# Patient Record
Sex: FEMALE | Race: WHITE | NOT HISPANIC OR LATINO | Employment: FULL TIME | URBAN - METROPOLITAN AREA
[De-identification: names, ages, dates, MRNs, and addresses within clinical notes are randomized per-mention and may not be internally consistent; named-entity substitution may affect disease eponyms.]

---

## 2018-03-18 ENCOUNTER — OFFICE VISIT (OUTPATIENT)
Dept: URGENT CARE | Facility: MEDICAL CENTER | Age: 53
End: 2018-03-18
Payer: COMMERCIAL

## 2018-03-18 VITALS
HEART RATE: 116 BPM | WEIGHT: 132 LBS | TEMPERATURE: 98.6 F | RESPIRATION RATE: 18 BRPM | BODY MASS INDEX: 25.78 KG/M2 | OXYGEN SATURATION: 98 % | DIASTOLIC BLOOD PRESSURE: 64 MMHG | SYSTOLIC BLOOD PRESSURE: 124 MMHG

## 2018-03-18 DIAGNOSIS — A08.4 VIRAL GASTROENTERITIS: Primary | ICD-10-CM

## 2018-03-18 PROCEDURE — 99283 EMERGENCY DEPT VISIT LOW MDM: CPT | Performed by: PHYSICIAN ASSISTANT

## 2018-03-18 PROCEDURE — G0382 LEV 3 HOSP TYPE B ED VISIT: HCPCS | Performed by: PHYSICIAN ASSISTANT

## 2018-03-18 NOTE — LETTER
March 18, 2018     Patient: Nakia Denton   YOB: 1965   Date of Visit: 3/18/2018       To Whom it May Concern:    Nakia Denton was seen in my clinic on 3/18/2018  She may return to work on 3/19/2018  Please excuse absence  If you have any questions or concerns, please don't hesitate to call           Sincerely,          Lucas Mcfadden PA-C        CC: No Recipients

## 2018-03-18 NOTE — PATIENT INSTRUCTIONS
Likely viral gastroenteritis, symptoms resolved at this time  [place] diet- BRAT- bananas, rice, applesauce and toast and advance as tolerated  Double up on fluids  Follow up with your PCP as needed  Go to the ER for any distress

## 2018-03-18 NOTE — PROGRESS NOTES
[address] [facility]        NAME: Bharti Langston is a 47 y o  female  : 1965    MRN: [id number]  DATE: 2018  TIME: 7:19 PM    Assessment and Plan   Viral gastroenteritis [A08 4]  1  Viral gastroenteritis           Patient Instructions      Likely viral gastroenteritis, symptoms resolved at this time  [place] diet- BRAT- bananas, rice, applesauce and toast and advance as tolerated  Double up on fluids  Follow up with PCP in 3-5 days  Proceed to  ER if symptoms worsen  Chief Complaint     Chief Complaint   Patient presents with    Headache         History of Present Illness         This is a 80-year-old female presenting nausea and vomiting x2 days  She states that her symptoms are resolved now, but she was having generalized abdominal pain, nausea, vomiting for 2 days that started 3 days ago  She last vomited last night  She states that she does have a mild headache at this time   But no vision changes, hearing changes, dizziness, fever  She is requesting a note so that she can return to work  Headache    Associated symptoms include abdominal pain, nausea and vomiting  Pertinent negatives include no coughing, dizziness, fever, numbness or weakness  Review of Systems   Review of Systems   Constitutional: Positive for appetite change  Negative for chills, fatigue and fever  Respiratory: Negative for cough and shortness of breath  Cardiovascular: Negative for chest pain and palpitations  Gastrointestinal: Positive for abdominal pain, nausea and vomiting  Negative for blood in stool and diarrhea  Musculoskeletal: Negative for arthralgias and myalgias  Neurological: Positive for headaches  Negative for dizziness, weakness and numbness  Current Medications     No current outpatient prescriptions on file      Current Allergies     Allergies as of 2018    (No Known Allergies)            The following portions of the patient's history were reviewed and updated as appropriate: allergies, current medications, past family history, past medical history, past social history, past surgical history and problem list      Past Medical History:   Diagnosis Date    Known health problems: none        No past surgical history on file  No family history on file  Medications have been verified  Objective   /64   Pulse (!) 116   Temp 98 6 °F (37 °C) (Temporal)   Resp 18   Wt 59 9 kg (132 lb)   SpO2 98%   BMI 25 78 kg/m²        Physical Exam     Physical Exam   Constitutional: She appears well-developed and well-nourished  HENT:   Head: Normocephalic and atraumatic  Right Ear: No drainage  Left Ear: No drainage  Nose: Nose normal    Mouth/Throat: Uvula is midline, oropharynx is clear and moist and mucous membranes are normal  No oropharyngeal exudate, posterior oropharyngeal edema or posterior oropharyngeal erythema  Eyes: Conjunctivae and EOM are normal  Pupils are equal, round, and reactive to light  Neck: Normal range of motion  Cardiovascular: Normal rate, regular rhythm and normal heart sounds  Pulmonary/Chest: Effort normal and breath sounds normal  No respiratory distress  She has no decreased breath sounds  She has no wheezes  She has no rhonchi  She has no rales  Abdominal: Soft  Bowel sounds are normal  She exhibits no distension and no mass  There is no tenderness  There is no rebound and no guarding  Neurological: She is alert  Skin: Skin is warm and dry  No rash noted  Psychiatric: She has a normal mood and affect

## 2019-05-02 ENCOUNTER — OFFICE VISIT (OUTPATIENT)
Dept: FAMILY MEDICINE CLINIC | Facility: MEDICAL CENTER | Age: 54
End: 2019-05-02
Payer: COMMERCIAL

## 2019-05-02 ENCOUNTER — HOSPITAL ENCOUNTER (OUTPATIENT)
Facility: HOSPITAL | Age: 54
Setting detail: OBSERVATION
Discharge: HOME | End: 2019-05-03
Attending: EMERGENCY MEDICINE | Admitting: INTERNAL MEDICINE
Payer: COMMERCIAL

## 2019-05-02 ENCOUNTER — APPOINTMENT (EMERGENCY)
Dept: RADIOLOGY | Facility: HOSPITAL | Age: 54
End: 2019-05-02
Payer: COMMERCIAL

## 2019-05-02 ENCOUNTER — APPOINTMENT (EMERGENCY)
Dept: CT IMAGING | Facility: HOSPITAL | Age: 54
End: 2019-05-02
Payer: COMMERCIAL

## 2019-05-02 VITALS
WEIGHT: 162 LBS | SYSTOLIC BLOOD PRESSURE: 180 MMHG | HEIGHT: 60 IN | RESPIRATION RATE: 16 BRPM | DIASTOLIC BLOOD PRESSURE: 96 MMHG | BODY MASS INDEX: 31.8 KG/M2 | HEART RATE: 76 BPM

## 2019-05-02 DIAGNOSIS — R07.9 CHEST PAIN, UNSPECIFIED TYPE: Primary | ICD-10-CM

## 2019-05-02 DIAGNOSIS — R10.9 LEFT FLANK PAIN: ICD-10-CM

## 2019-05-02 DIAGNOSIS — D25.9 UTERINE LEIOMYOMA, UNSPECIFIED LOCATION: ICD-10-CM

## 2019-05-02 DIAGNOSIS — H53.9 VISUAL DISTURBANCE: ICD-10-CM

## 2019-05-02 DIAGNOSIS — Z13.31 POSITIVE DEPRESSION SCREENING: ICD-10-CM

## 2019-05-02 DIAGNOSIS — R07.9 CHEST PAIN, UNSPECIFIED TYPE: ICD-10-CM

## 2019-05-02 DIAGNOSIS — R06.02 SOB (SHORTNESS OF BREATH): ICD-10-CM

## 2019-05-02 DIAGNOSIS — R10.9 ABDOMINAL PAIN, UNSPECIFIED ABDOMINAL LOCATION: ICD-10-CM

## 2019-05-02 DIAGNOSIS — I16.0 HYPERTENSIVE URGENCY: ICD-10-CM

## 2019-05-02 DIAGNOSIS — R03.0 ELEVATED BP WITHOUT DIAGNOSIS OF HYPERTENSION: ICD-10-CM

## 2019-05-02 DIAGNOSIS — I20.0 UNSTABLE ANGINA (HCC): Primary | ICD-10-CM

## 2019-05-02 PROBLEM — M79.89 LEG SWELLING: Status: ACTIVE | Noted: 2019-05-02

## 2019-05-02 LAB
ALBUMIN SERPL BCP-MCNC: 3.7 G/DL (ref 3.5–5)
ALP SERPL-CCNC: 99 U/L (ref 46–116)
ALT SERPL W P-5'-P-CCNC: 28 U/L (ref 12–78)
ANION GAP SERPL CALCULATED.3IONS-SCNC: 10 MMOL/L (ref 4–13)
AST SERPL W P-5'-P-CCNC: 17 U/L (ref 5–45)
BACTERIA UR QL AUTO: NORMAL /HPF
BASOPHILS # BLD AUTO: 0.06 THOUSANDS/ΜL (ref 0–0.1)
BASOPHILS NFR BLD AUTO: 1 % (ref 0–1)
BILIRUB SERPL-MCNC: 0.1 MG/DL (ref 0.2–1)
BILIRUB UR QL STRIP: NEGATIVE
BUN SERPL-MCNC: 14 MG/DL (ref 5–25)
CALCIUM SERPL-MCNC: 9.7 MG/DL (ref 8.3–10.1)
CHLORIDE SERPL-SCNC: 104 MMOL/L (ref 100–108)
CLARITY UR: CLEAR
CO2 SERPL-SCNC: 27 MMOL/L (ref 21–32)
COLOR UR: YELLOW
CREAT SERPL-MCNC: 0.83 MG/DL (ref 0.6–1.3)
EOSINOPHIL # BLD AUTO: 0.35 THOUSAND/ΜL (ref 0–0.61)
EOSINOPHIL NFR BLD AUTO: 5 % (ref 0–6)
ERYTHROCYTE [DISTWIDTH] IN BLOOD BY AUTOMATED COUNT: 12.9 % (ref 11.6–15.1)
EST. AVERAGE GLUCOSE BLD GHB EST-MCNC: 126 MG/DL
EXT PREG TEST URINE: NEGATIVE
GFR SERPL CREATININE-BSD FRML MDRD: 81 ML/MIN/1.73SQ M
GLUCOSE SERPL-MCNC: 112 MG/DL (ref 65–140)
GLUCOSE UR STRIP-MCNC: NEGATIVE MG/DL
HBA1C MFR BLD: 6 % (ref 4.2–6.3)
HCT VFR BLD AUTO: 41.7 % (ref 34.8–46.1)
HGB BLD-MCNC: 13.4 G/DL (ref 11.5–15.4)
HGB UR QL STRIP.AUTO: ABNORMAL
IMM GRANULOCYTES # BLD AUTO: 0.04 THOUSAND/UL (ref 0–0.2)
IMM GRANULOCYTES NFR BLD AUTO: 1 % (ref 0–2)
KETONES UR STRIP-MCNC: NEGATIVE MG/DL
LEUKOCYTE ESTERASE UR QL STRIP: NEGATIVE
LYMPHOCYTES # BLD AUTO: 2.96 THOUSANDS/ΜL (ref 0.6–4.47)
LYMPHOCYTES NFR BLD AUTO: 40 % (ref 14–44)
MCH RBC QN AUTO: 29.6 PG (ref 26.8–34.3)
MCHC RBC AUTO-ENTMCNC: 32.1 G/DL (ref 31.4–37.4)
MCV RBC AUTO: 92 FL (ref 82–98)
MONOCYTES # BLD AUTO: 0.47 THOUSAND/ΜL (ref 0.17–1.22)
MONOCYTES NFR BLD AUTO: 6 % (ref 4–12)
NEUTROPHILS # BLD AUTO: 3.54 THOUSANDS/ΜL (ref 1.85–7.62)
NEUTS SEG NFR BLD AUTO: 47 % (ref 43–75)
NITRITE UR QL STRIP: NEGATIVE
NON-SQ EPI CELLS URNS QL MICRO: NORMAL /HPF
NRBC BLD AUTO-RTO: 0 /100 WBCS
NT-PROBNP SERPL-MCNC: 68 PG/ML
PH UR STRIP.AUTO: 6 [PH] (ref 4.5–8)
PLATELET # BLD AUTO: 274 THOUSANDS/UL (ref 149–390)
PMV BLD AUTO: 10.2 FL (ref 8.9–12.7)
POTASSIUM SERPL-SCNC: 4 MMOL/L (ref 3.5–5.3)
PROT SERPL-MCNC: 7.5 G/DL (ref 6.4–8.2)
PROT UR STRIP-MCNC: NEGATIVE MG/DL
RBC # BLD AUTO: 4.52 MILLION/UL (ref 3.81–5.12)
RBC #/AREA URNS AUTO: NORMAL /HPF
SODIUM SERPL-SCNC: 141 MMOL/L (ref 136–145)
SP GR UR STRIP.AUTO: <=1.005 (ref 1–1.03)
TROPONIN I SERPL-MCNC: <0.02 NG/ML
TSH SERPL DL<=0.05 MIU/L-ACNC: 3.3 UIU/ML (ref 0.36–3.74)
UROBILINOGEN UR QL STRIP.AUTO: 0.2 E.U./DL
WBC # BLD AUTO: 7.42 THOUSAND/UL (ref 4.31–10.16)
WBC #/AREA URNS AUTO: NORMAL /HPF

## 2019-05-02 PROCEDURE — 36415 COLL VENOUS BLD VENIPUNCTURE: CPT | Performed by: EMERGENCY MEDICINE

## 2019-05-02 PROCEDURE — 96374 THER/PROPH/DIAG INJ IV PUSH: CPT

## 2019-05-02 PROCEDURE — 99220 PR INITIAL OBSERVATION CARE/DAY 70 MINUTES: CPT | Performed by: INTERNAL MEDICINE

## 2019-05-02 PROCEDURE — 83036 HEMOGLOBIN GLYCOSYLATED A1C: CPT | Performed by: PHYSICIAN ASSISTANT

## 2019-05-02 PROCEDURE — 74174 CTA ABD&PLVS W/CONTRAST: CPT

## 2019-05-02 PROCEDURE — 81001 URINALYSIS AUTO W/SCOPE: CPT

## 2019-05-02 PROCEDURE — 71275 CT ANGIOGRAPHY CHEST: CPT

## 2019-05-02 PROCEDURE — 93005 ELECTROCARDIOGRAM TRACING: CPT

## 2019-05-02 PROCEDURE — 85025 COMPLETE CBC W/AUTO DIFF WBC: CPT | Performed by: EMERGENCY MEDICINE

## 2019-05-02 PROCEDURE — 81025 URINE PREGNANCY TEST: CPT | Performed by: EMERGENCY MEDICINE

## 2019-05-02 PROCEDURE — 83880 ASSAY OF NATRIURETIC PEPTIDE: CPT | Performed by: EMERGENCY MEDICINE

## 2019-05-02 PROCEDURE — 96375 TX/PRO/DX INJ NEW DRUG ADDON: CPT

## 2019-05-02 PROCEDURE — 99285 EMERGENCY DEPT VISIT HI MDM: CPT

## 2019-05-02 PROCEDURE — 99285 EMERGENCY DEPT VISIT HI MDM: CPT | Performed by: EMERGENCY MEDICINE

## 2019-05-02 PROCEDURE — 84484 ASSAY OF TROPONIN QUANT: CPT | Performed by: PHYSICIAN ASSISTANT

## 2019-05-02 PROCEDURE — 71046 X-RAY EXAM CHEST 2 VIEWS: CPT

## 2019-05-02 PROCEDURE — 99204 OFFICE O/P NEW MOD 45 MIN: CPT | Performed by: FAMILY MEDICINE

## 2019-05-02 PROCEDURE — 80053 COMPREHEN METABOLIC PANEL: CPT | Performed by: EMERGENCY MEDICINE

## 2019-05-02 PROCEDURE — 84443 ASSAY THYROID STIM HORMONE: CPT | Performed by: PHYSICIAN ASSISTANT

## 2019-05-02 PROCEDURE — 84484 ASSAY OF TROPONIN QUANT: CPT | Performed by: EMERGENCY MEDICINE

## 2019-05-02 RX ORDER — METHOCARBAMOL 500 MG/1
500 TABLET, FILM COATED ORAL EVERY 8 HOURS SCHEDULED
Status: DISCONTINUED | OUTPATIENT
Start: 2019-05-02 | End: 2019-05-03 | Stop reason: HOSPADM

## 2019-05-02 RX ORDER — HYDROCHLOROTHIAZIDE 25 MG/1
25 TABLET ORAL DAILY
Status: DISCONTINUED | OUTPATIENT
Start: 2019-05-03 | End: 2019-05-03 | Stop reason: HOSPADM

## 2019-05-02 RX ORDER — MAGNESIUM HYDROXIDE/ALUMINUM HYDROXICE/SIMETHICONE 120; 1200; 1200 MG/30ML; MG/30ML; MG/30ML
30 SUSPENSION ORAL ONCE
Status: COMPLETED | OUTPATIENT
Start: 2019-05-02 | End: 2019-05-02

## 2019-05-02 RX ORDER — ONDANSETRON 2 MG/ML
4 INJECTION INTRAMUSCULAR; INTRAVENOUS EVERY 6 HOURS PRN
Status: DISCONTINUED | OUTPATIENT
Start: 2019-05-02 | End: 2019-05-03 | Stop reason: HOSPADM

## 2019-05-02 RX ORDER — 0.9 % SODIUM CHLORIDE 0.9 %
3 VIAL (ML) INJECTION AS NEEDED
Status: DISCONTINUED | OUTPATIENT
Start: 2019-05-02 | End: 2019-05-03 | Stop reason: HOSPADM

## 2019-05-02 RX ORDER — FAMOTIDINE 20 MG/1
20 TABLET, FILM COATED ORAL 2 TIMES DAILY
Status: DISCONTINUED | OUTPATIENT
Start: 2019-05-02 | End: 2019-05-03 | Stop reason: HOSPADM

## 2019-05-02 RX ORDER — METHOCARBAMOL 500 MG/1
500 TABLET, FILM COATED ORAL EVERY 6 HOURS PRN
Status: DISCONTINUED | OUTPATIENT
Start: 2019-05-02 | End: 2019-05-02

## 2019-05-02 RX ORDER — ACETAMINOPHEN 325 MG/1
650 TABLET ORAL EVERY 6 HOURS PRN
Status: DISCONTINUED | OUTPATIENT
Start: 2019-05-02 | End: 2019-05-03

## 2019-05-02 RX ORDER — LIDOCAINE 50 MG/G
1 PATCH TOPICAL DAILY
Status: DISCONTINUED | OUTPATIENT
Start: 2019-05-02 | End: 2019-05-03 | Stop reason: HOSPADM

## 2019-05-02 RX ORDER — FUROSEMIDE 10 MG/ML
40 INJECTION INTRAMUSCULAR; INTRAVENOUS ONCE
Status: COMPLETED | OUTPATIENT
Start: 2019-05-02 | End: 2019-05-02

## 2019-05-02 RX ORDER — MAGNESIUM HYDROXIDE/ALUMINUM HYDROXICE/SIMETHICONE 120; 1200; 1200 MG/30ML; MG/30ML; MG/30ML
30 SUSPENSION ORAL EVERY 4 HOURS PRN
Status: DISCONTINUED | OUTPATIENT
Start: 2019-05-02 | End: 2019-05-03 | Stop reason: HOSPADM

## 2019-05-02 RX ORDER — KETOROLAC TROMETHAMINE 30 MG/ML
15 INJECTION, SOLUTION INTRAMUSCULAR; INTRAVENOUS ONCE
Status: COMPLETED | OUTPATIENT
Start: 2019-05-02 | End: 2019-05-02

## 2019-05-02 RX ADMIN — METHOCARBAMOL TABLETS 500 MG: 500 TABLET, COATED ORAL at 17:00

## 2019-05-02 RX ADMIN — NITROGLYCERIN 1 INCH: 20 OINTMENT TOPICAL at 19:32

## 2019-05-02 RX ADMIN — ALUMINUM HYDROXIDE, MAGNESIUM HYDROXIDE, AND SIMETHICONE 30 ML: 200; 200; 20 SUSPENSION ORAL at 22:44

## 2019-05-02 RX ADMIN — LIDOCAINE 1 PATCH: 50 PATCH TOPICAL at 19:32

## 2019-05-02 RX ADMIN — ONDANSETRON 4 MG: 2 INJECTION INTRAMUSCULAR; INTRAVENOUS at 20:12

## 2019-05-02 RX ADMIN — ACETAMINOPHEN 650 MG: 325 TABLET, FILM COATED ORAL at 15:55

## 2019-05-02 RX ADMIN — LIDOCAINE HYDROCHLORIDE 10 ML: 20 SOLUTION ORAL; TOPICAL at 14:29

## 2019-05-02 RX ADMIN — METHOCARBAMOL TABLETS 500 MG: 500 TABLET, COATED ORAL at 21:30

## 2019-05-02 RX ADMIN — KETOROLAC TROMETHAMINE 15 MG: 30 INJECTION, SOLUTION INTRAMUSCULAR; INTRAVENOUS at 12:14

## 2019-05-02 RX ADMIN — IOHEXOL 100 ML: 350 INJECTION, SOLUTION INTRAVENOUS at 13:29

## 2019-05-02 RX ADMIN — SODIUM CHLORIDE 500 ML: 0.9 INJECTION, SOLUTION INTRAVENOUS at 21:30

## 2019-05-02 RX ADMIN — MORPHINE SULFATE 2 MG: 2 INJECTION, SOLUTION INTRAMUSCULAR; INTRAVENOUS at 14:09

## 2019-05-02 RX ADMIN — LIDOCAINE HYDROCHLORIDE 15 ML: 20 SOLUTION ORAL; TOPICAL at 22:44

## 2019-05-02 RX ADMIN — FAMOTIDINE 20 MG: 20 TABLET ORAL at 22:44

## 2019-05-02 RX ADMIN — ALUMINUM HYDROXIDE, MAGNESIUM HYDROXIDE, AND SIMETHICONE 30 ML: 200; 200; 20 SUSPENSION ORAL at 14:29

## 2019-05-02 RX ADMIN — FUROSEMIDE 40 MG: 10 INJECTION, SOLUTION INTRAMUSCULAR; INTRAVENOUS at 15:55

## 2019-05-03 ENCOUNTER — APPOINTMENT (OUTPATIENT)
Dept: NON INVASIVE DIAGNOSTICS | Facility: HOSPITAL | Age: 54
End: 2019-05-03
Payer: COMMERCIAL

## 2019-05-03 VITALS
WEIGHT: 158.95 LBS | RESPIRATION RATE: 18 BRPM | SYSTOLIC BLOOD PRESSURE: 108 MMHG | HEART RATE: 77 BPM | BODY MASS INDEX: 31.21 KG/M2 | TEMPERATURE: 98.5 F | HEIGHT: 60 IN | DIASTOLIC BLOOD PRESSURE: 61 MMHG | OXYGEN SATURATION: 96 %

## 2019-05-03 LAB
ANION GAP SERPL CALCULATED.3IONS-SCNC: 8 MMOL/L (ref 4–13)
ATRIAL RATE: 64 BPM
ATRIAL RATE: 99 BPM
BUN SERPL-MCNC: 20 MG/DL (ref 5–25)
CALCIUM SERPL-MCNC: 9.5 MG/DL (ref 8.3–10.1)
CHLORIDE SERPL-SCNC: 104 MMOL/L (ref 100–108)
CHOLEST SERPL-MCNC: 280 MG/DL (ref 50–200)
CO2 SERPL-SCNC: 28 MMOL/L (ref 21–32)
CREAT SERPL-MCNC: 1.12 MG/DL (ref 0.6–1.3)
GFR SERPL CREATININE-BSD FRML MDRD: 57 ML/MIN/1.73SQ M
GLUCOSE SERPL-MCNC: 100 MG/DL (ref 65–140)
HDLC SERPL-MCNC: 76 MG/DL (ref 40–60)
LDLC SERPL CALC-MCNC: 192 MG/DL (ref 0–100)
P AXIS: 55 DEGREES
P AXIS: 66 DEGREES
POTASSIUM SERPL-SCNC: 4.1 MMOL/L (ref 3.5–5.3)
PR INTERVAL: 152 MS
PR INTERVAL: 158 MS
QRS AXIS: 23 DEGREES
QRS AXIS: 9 DEGREES
QRSD INTERVAL: 74 MS
QRSD INTERVAL: 78 MS
QT INTERVAL: 360 MS
QT INTERVAL: 458 MS
QTC INTERVAL: 462 MS
QTC INTERVAL: 472 MS
SODIUM SERPL-SCNC: 140 MMOL/L (ref 136–145)
T WAVE AXIS: 34 DEGREES
T WAVE AXIS: 70 DEGREES
TRIGL SERPL-MCNC: 61 MG/DL
VENTRICULAR RATE: 64 BPM
VENTRICULAR RATE: 99 BPM

## 2019-05-03 PROCEDURE — 99244 OFF/OP CNSLTJ NEW/EST MOD 40: CPT | Performed by: INTERNAL MEDICINE

## 2019-05-03 PROCEDURE — 93010 ELECTROCARDIOGRAM REPORT: CPT | Performed by: INTERNAL MEDICINE

## 2019-05-03 PROCEDURE — 93306 TTE W/DOPPLER COMPLETE: CPT

## 2019-05-03 PROCEDURE — 93306 TTE W/DOPPLER COMPLETE: CPT | Performed by: INTERNAL MEDICINE

## 2019-05-03 PROCEDURE — 80048 BASIC METABOLIC PNL TOTAL CA: CPT | Performed by: PHYSICIAN ASSISTANT

## 2019-05-03 PROCEDURE — 99217 PR OBSERVATION CARE DISCHARGE MANAGEMENT: CPT | Performed by: INTERNAL MEDICINE

## 2019-05-03 PROCEDURE — 80061 LIPID PANEL: CPT | Performed by: PHYSICIAN ASSISTANT

## 2019-05-03 RX ORDER — ATORVASTATIN CALCIUM 40 MG/1
40 TABLET, FILM COATED ORAL
Status: DISCONTINUED | OUTPATIENT
Start: 2019-05-03 | End: 2019-05-03 | Stop reason: HOSPADM

## 2019-05-03 RX ORDER — METHOCARBAMOL 500 MG/1
500 TABLET, FILM COATED ORAL EVERY 8 HOURS SCHEDULED
Qty: 20 TABLET | Refills: 0 | Status: SHIPPED | OUTPATIENT
Start: 2019-05-03 | End: 2019-05-08

## 2019-05-03 RX ORDER — ATORVASTATIN CALCIUM 40 MG/1
40 TABLET, FILM COATED ORAL
Qty: 30 TABLET | Refills: 0 | Status: SHIPPED | OUTPATIENT
Start: 2019-05-03 | End: 2019-05-30 | Stop reason: SDUPTHER

## 2019-05-03 RX ORDER — IBUPROFEN 600 MG/1
600 TABLET ORAL EVERY 8 HOURS PRN
Status: DISCONTINUED | OUTPATIENT
Start: 2019-05-03 | End: 2019-05-03 | Stop reason: HOSPADM

## 2019-05-03 RX ORDER — FAMOTIDINE 20 MG/1
20 TABLET, FILM COATED ORAL 2 TIMES DAILY
Qty: 60 TABLET | Refills: 0 | Status: SHIPPED | OUTPATIENT
Start: 2019-05-03 | End: 2019-05-08

## 2019-05-03 RX ORDER — HYDROCHLOROTHIAZIDE 25 MG/1
25 TABLET ORAL DAILY
Qty: 30 TABLET | Refills: 0 | Status: SHIPPED | OUTPATIENT
Start: 2019-05-04 | End: 2019-05-30 | Stop reason: SDUPTHER

## 2019-05-03 RX ADMIN — FAMOTIDINE 20 MG: 20 TABLET ORAL at 17:54

## 2019-05-03 RX ADMIN — IBUPROFEN 600 MG: 600 TABLET ORAL at 14:07

## 2019-05-03 RX ADMIN — FAMOTIDINE 20 MG: 20 TABLET ORAL at 11:38

## 2019-05-03 RX ADMIN — DICLOFENAC 2 G: 10 GEL TOPICAL at 14:06

## 2019-05-03 RX ADMIN — METHOCARBAMOL TABLETS 500 MG: 500 TABLET, COATED ORAL at 05:25

## 2019-05-03 RX ADMIN — METHOCARBAMOL TABLETS 500 MG: 500 TABLET, COATED ORAL at 14:07

## 2019-05-03 RX ADMIN — LIDOCAINE 1 PATCH: 50 PATCH TOPICAL at 17:55

## 2019-05-03 RX ADMIN — ATORVASTATIN CALCIUM 40 MG: 40 TABLET, FILM COATED ORAL at 17:54

## 2019-05-03 RX ADMIN — ACETAMINOPHEN 650 MG: 325 TABLET, FILM COATED ORAL at 05:25

## 2019-05-03 RX ADMIN — HYDROCHLOROTHIAZIDE 25 MG: 25 TABLET ORAL at 11:38

## 2019-05-03 RX ADMIN — METOPROLOL TARTRATE 25 MG: 25 TABLET ORAL at 14:06

## 2019-05-06 ENCOUNTER — TRANSITIONAL CARE MANAGEMENT (OUTPATIENT)
Dept: FAMILY MEDICINE CLINIC | Facility: MEDICAL CENTER | Age: 54
End: 2019-05-06

## 2019-05-06 ENCOUNTER — HOSPITAL ENCOUNTER (OUTPATIENT)
Dept: NON INVASIVE DIAGNOSTICS | Facility: HOSPITAL | Age: 54
Discharge: HOME | End: 2019-05-06
Attending: INTERNAL MEDICINE
Payer: COMMERCIAL

## 2019-05-06 ENCOUNTER — HOSPITAL ENCOUNTER (OUTPATIENT)
Dept: NUCLEAR MEDICINE | Facility: HOSPITAL | Age: 54
Discharge: HOME | End: 2019-05-06
Attending: INTERNAL MEDICINE
Payer: COMMERCIAL

## 2019-05-06 DIAGNOSIS — I16.0 HYPERTENSIVE URGENCY: ICD-10-CM

## 2019-05-06 DIAGNOSIS — R07.9 CHEST PAIN, UNSPECIFIED TYPE: ICD-10-CM

## 2019-05-06 LAB
CHEST PAIN STATEMENT: NORMAL
MAX DIASTOLIC BP: 98 MMHG
MAX HEART RATE: 121 BPM
MAX PREDICTED HEART RATE: 168 BPM
MAX. SYSTOLIC BP: 168 MMHG
PROTOCOL NAME: NORMAL
REASON FOR TERMINATION: NORMAL
TARGET HR FORMULA: NORMAL
TEST INDICATION: NORMAL
TIME IN EXERCISE PHASE: NORMAL

## 2019-05-06 PROCEDURE — A9502 TC99M TETROFOSMIN: HCPCS

## 2019-05-06 PROCEDURE — 93017 CV STRESS TEST TRACING ONLY: CPT

## 2019-05-06 PROCEDURE — 93018 CV STRESS TEST I&R ONLY: CPT | Performed by: INTERNAL MEDICINE

## 2019-05-06 PROCEDURE — 78452 HT MUSCLE IMAGE SPECT MULT: CPT | Performed by: INTERNAL MEDICINE

## 2019-05-06 PROCEDURE — 78452 HT MUSCLE IMAGE SPECT MULT: CPT

## 2019-05-06 PROCEDURE — 93016 CV STRESS TEST SUPVJ ONLY: CPT | Performed by: INTERNAL MEDICINE

## 2019-05-06 RX ADMIN — REGADENOSON 0.4 MG: 0.08 INJECTION, SOLUTION INTRAVENOUS at 09:45

## 2019-05-08 ENCOUNTER — OFFICE VISIT (OUTPATIENT)
Dept: FAMILY MEDICINE CLINIC | Facility: MEDICAL CENTER | Age: 54
End: 2019-05-08
Payer: COMMERCIAL

## 2019-05-08 VITALS
HEART RATE: 76 BPM | WEIGHT: 161.8 LBS | SYSTOLIC BLOOD PRESSURE: 136 MMHG | BODY MASS INDEX: 31.6 KG/M2 | DIASTOLIC BLOOD PRESSURE: 80 MMHG | RESPIRATION RATE: 16 BRPM

## 2019-05-08 DIAGNOSIS — I10 ESSENTIAL HYPERTENSION: Primary | ICD-10-CM

## 2019-05-08 DIAGNOSIS — Z12.39 SCREENING FOR BREAST CANCER: ICD-10-CM

## 2019-05-08 DIAGNOSIS — E78.00 ELEVATED CHOLESTEROL: ICD-10-CM

## 2019-05-08 DIAGNOSIS — R91.1 LUNG NODULE: ICD-10-CM

## 2019-05-08 DIAGNOSIS — D25.9 UTERINE LEIOMYOMA, UNSPECIFIED LOCATION: ICD-10-CM

## 2019-05-08 DIAGNOSIS — Z77.22 SECOND HAND SMOKE EXPOSURE: ICD-10-CM

## 2019-05-08 DIAGNOSIS — Z12.11 SCREENING FOR COLON CANCER: ICD-10-CM

## 2019-05-08 DIAGNOSIS — I51.89 DIASTOLIC DYSFUNCTION: ICD-10-CM

## 2019-05-08 DIAGNOSIS — N93.9 ABNORMAL VAGINAL BLEEDING: ICD-10-CM

## 2019-05-08 PROBLEM — M79.89 LEG SWELLING: Status: RESOLVED | Noted: 2019-05-02 | Resolved: 2019-05-08

## 2019-05-08 PROCEDURE — 1111F DSCHRG MED/CURRENT MED MERGE: CPT | Performed by: FAMILY MEDICINE

## 2019-05-08 PROCEDURE — 99495 TRANSJ CARE MGMT MOD F2F 14D: CPT | Performed by: FAMILY MEDICINE

## 2019-05-13 ENCOUNTER — TELEPHONE (OUTPATIENT)
Dept: CARDIOLOGY CLINIC | Facility: CLINIC | Age: 54
End: 2019-05-13

## 2019-05-30 ENCOUNTER — OFFICE VISIT (OUTPATIENT)
Dept: CARDIOLOGY CLINIC | Facility: CLINIC | Age: 54
End: 2019-05-30
Payer: COMMERCIAL

## 2019-05-30 VITALS
BODY MASS INDEX: 31.59 KG/M2 | HEIGHT: 60 IN | SYSTOLIC BLOOD PRESSURE: 124 MMHG | WEIGHT: 160.9 LBS | HEART RATE: 62 BPM | DIASTOLIC BLOOD PRESSURE: 78 MMHG

## 2019-05-30 DIAGNOSIS — I16.0 HYPERTENSIVE URGENCY: ICD-10-CM

## 2019-05-30 DIAGNOSIS — R07.9 CHEST PAIN, UNSPECIFIED TYPE: Primary | ICD-10-CM

## 2019-05-30 DIAGNOSIS — E78.5 HYPERLIPIDEMIA, UNSPECIFIED HYPERLIPIDEMIA TYPE: ICD-10-CM

## 2019-05-30 DIAGNOSIS — I10 HYPERTENSION, UNSPECIFIED TYPE: ICD-10-CM

## 2019-05-30 PROCEDURE — 99214 OFFICE O/P EST MOD 30 MIN: CPT | Performed by: INTERNAL MEDICINE

## 2019-05-30 PROCEDURE — 93000 ELECTROCARDIOGRAM COMPLETE: CPT | Performed by: INTERNAL MEDICINE

## 2019-05-30 RX ORDER — HYDROCHLOROTHIAZIDE 25 MG/1
25 TABLET ORAL DAILY
Qty: 90 TABLET | Refills: 3 | Status: SHIPPED | OUTPATIENT
Start: 2019-05-30 | End: 2020-04-22

## 2019-05-30 RX ORDER — ATORVASTATIN CALCIUM 40 MG/1
40 TABLET, FILM COATED ORAL
Qty: 90 TABLET | Refills: 3 | Status: SHIPPED | OUTPATIENT
Start: 2019-05-30 | End: 2020-04-22

## 2019-06-27 ENCOUNTER — HOSPITAL ENCOUNTER (OUTPATIENT)
Dept: RADIOLOGY | Facility: MEDICAL CENTER | Age: 54
Discharge: HOME | End: 2019-06-27
Payer: COMMERCIAL

## 2019-06-27 ENCOUNTER — OFFICE VISIT (OUTPATIENT)
Dept: URGENT CARE | Facility: MEDICAL CENTER | Age: 54
End: 2019-06-27
Payer: COMMERCIAL

## 2019-06-27 VITALS
HEIGHT: 60 IN | RESPIRATION RATE: 16 BRPM | DIASTOLIC BLOOD PRESSURE: 83 MMHG | WEIGHT: 158 LBS | HEART RATE: 87 BPM | SYSTOLIC BLOOD PRESSURE: 138 MMHG | TEMPERATURE: 98.4 F | BODY MASS INDEX: 31.02 KG/M2

## 2019-06-27 VITALS — WEIGHT: 160 LBS | BODY MASS INDEX: 31.41 KG/M2 | HEIGHT: 60 IN

## 2019-06-27 DIAGNOSIS — M79.672 PAIN IN BOTH FEET: Primary | ICD-10-CM

## 2019-06-27 DIAGNOSIS — Z12.39 SCREENING FOR BREAST CANCER: ICD-10-CM

## 2019-06-27 DIAGNOSIS — M79.671 PAIN IN BOTH FEET: Primary | ICD-10-CM

## 2019-06-27 DIAGNOSIS — L25.9 CONTACT DERMATITIS, UNSPECIFIED CONTACT DERMATITIS TYPE, UNSPECIFIED TRIGGER: ICD-10-CM

## 2019-06-27 PROCEDURE — 99203 OFFICE O/P NEW LOW 30 MIN: CPT | Performed by: PHYSICIAN ASSISTANT

## 2019-06-27 PROCEDURE — G0382 LEV 3 HOSP TYPE B ED VISIT: HCPCS | Performed by: PHYSICIAN ASSISTANT

## 2019-06-27 PROCEDURE — 99283 EMERGENCY DEPT VISIT LOW MDM: CPT | Performed by: PHYSICIAN ASSISTANT

## 2019-06-27 PROCEDURE — 77063 BREAST TOMOSYNTHESIS BI: CPT

## 2019-06-27 PROCEDURE — 77067 SCR MAMMO BI INCL CAD: CPT

## 2019-06-27 RX ORDER — PREDNISONE 20 MG/1
20 TABLET ORAL 2 TIMES DAILY WITH MEALS
Qty: 10 TABLET | Refills: 0 | Status: SHIPPED | OUTPATIENT
Start: 2019-06-27 | End: 2019-07-03 | Stop reason: ALTCHOICE

## 2019-07-03 ENCOUNTER — APPOINTMENT (OUTPATIENT)
Dept: RADIOLOGY | Facility: MEDICAL CENTER | Age: 54
End: 2019-07-03
Payer: COMMERCIAL

## 2019-07-03 ENCOUNTER — OFFICE VISIT (OUTPATIENT)
Dept: FAMILY MEDICINE CLINIC | Facility: MEDICAL CENTER | Age: 54
End: 2019-07-03
Payer: COMMERCIAL

## 2019-07-03 VITALS
BODY MASS INDEX: 31.49 KG/M2 | SYSTOLIC BLOOD PRESSURE: 136 MMHG | HEART RATE: 90 BPM | DIASTOLIC BLOOD PRESSURE: 82 MMHG | HEIGHT: 60 IN | WEIGHT: 160.38 LBS | RESPIRATION RATE: 14 BRPM

## 2019-07-03 DIAGNOSIS — M79.671 FOOT PAIN, BILATERAL: ICD-10-CM

## 2019-07-03 DIAGNOSIS — I10 ESSENTIAL HYPERTENSION: Primary | ICD-10-CM

## 2019-07-03 DIAGNOSIS — M79.672 FOOT PAIN, BILATERAL: ICD-10-CM

## 2019-07-03 DIAGNOSIS — E78.00 ELEVATED CHOLESTEROL: ICD-10-CM

## 2019-07-03 PROBLEM — E78.5 HYPERLIPIDEMIA: Status: RESOLVED | Noted: 2019-05-30 | Resolved: 2019-07-03

## 2019-07-03 PROBLEM — R07.9 CHEST PAIN: Status: RESOLVED | Noted: 2019-05-02 | Resolved: 2019-07-03

## 2019-07-03 PROCEDURE — 99213 OFFICE O/P EST LOW 20 MIN: CPT | Performed by: FAMILY MEDICINE

## 2019-07-03 PROCEDURE — 73630 X-RAY EXAM OF FOOT: CPT

## 2019-07-03 NOTE — PROGRESS NOTES
Assessment/Plan:    No problem-specific Assessment & Plan notes found for this encounter  Diagnoses and all orders for this visit:    Essential hypertension  Blood pressure is well controlled today  Continue hydrochlorothiazide 25 mg daily and metoprolol 25 mg twice daily  Patient encouraged to get her labs done  Elevated cholesterol  Patient encouraged to get her labs done to check her cholesterol level  Will await results and adjust medication if needed  For now continue atorvastatin 40 mg daily  Foot pain, bilateral  -     XR foot 3+ vw left; Future  -     XR foot 3+ vw right; Future  Highly suspect plantar fasciitis  Possible heel spurs as well  This is not work related  I encouraged patient to stretch her plantar fascia with either a tennis ball or a sealed can of soda  Check x-rays to assess for bony abnormality  Patient was highly encouraged to purchase a good pair of walking shoes and this should greatly helped her symptoms  Weight loss with low-calorie diet and regular exercise should also improve her pain as well  Follow-up in six months or sooner if needed  Subjective:      Patient ID: Nanci High is a 47 y o  female  Patient presents for follow-up  She has hypertension  She is currently on hydrochlorothiazide 25 mg daily and metoprolol 25 mg twice daily for treatment  Tolerating medication well without any lightheadedness or dizziness  Has not yet had her repeat blood work drawn  She has elevated cholesterol  She is currently on atorvastatin 40 mg daily  Tolerating medication well without any myalgias  Has not yet had her repeat blood work drawn  She has bilateral foot pain  Location is the bottom of the feet  Started hurting the past few weeks since she started her new job  [place] a [place] the [place]  Was seen at the urgent care recently and told to use ibuprofen        The following portions of the patient's history were reviewed and updated as appropriate: She  has a past medical history of Fibroids  She   Patient Active Problem List    Diagnosis Date Noted    Hypertension 30/31/3597    Diastolic dysfunction 53/55/9357    Elevated cholesterol 05/08/2019    Second hand smoke exposure 05/08/2019    Lung nodule 05/08/2019    Leiomyoma of uterus 03/18/2014    Dysmenorrhea 03/18/2014    Abnormal vaginal bleeding 03/18/2014     She  has a past surgical history that includes Endometrial biopsy; Tonsillectomy; and Tubal ligation  Her family history includes Arthritis in her maternal grandmother and mother; Breast cancer in her other; Colon cancer (age of onset: 58) in her maternal grandfather; Diabetes in her maternal grandmother and paternal grandmother; Diverticulitis in her mother; Heart disease in her maternal grandmother, mother, and paternal grandmother; Hypertension in her mother; Migraines in her mother; No Known Problems in her daughter, father, paternal grandfather, and sister; Thyroid disease in her mother; Varicose Veins in her maternal grandmother  She  reports that she has never smoked  She has never used smokeless tobacco  She reports that she drinks alcohol  She reports that she has current or past drug history  Current Outpatient Medications   Medication Sig Dispense Refill    atorvastatin (LIPITOR) 40 mg tablet Take 1 tablet (40 mg total) by mouth daily with dinner 90 tablet 3    hydrochlorothiazide (HYDRODIURIL) 25 mg tablet Take 1 tablet (25 mg total) by mouth daily 90 tablet 3    metoprolol tartrate (LOPRESSOR) 25 mg tablet Take 1 tablet (25 mg total) by mouth every 12 (twelve) hours 180 tablet 3     No current facility-administered medications for this visit        Current Outpatient Medications on File Prior to Visit   Medication Sig    atorvastatin (LIPITOR) 40 mg tablet Take 1 tablet (40 mg total) by mouth daily with dinner    hydrochlorothiazide (HYDRODIURIL) 25 mg tablet Take 1 tablet (25 mg total) by mouth daily    metoprolol tartrate (LOPRESSOR) 25 mg tablet Take 1 tablet (25 mg total) by mouth every 12 (twelve) hours    [DISCONTINUED] predniSONE 20 mg tablet Take 1 tablet (20 mg total) by mouth 2 (two) times a day with meals for 5 days     No current facility-administered medications on file prior to visit  She has No Known Allergies  BMI Counseling: Body mass index is 31 32 kg/m²  Discussed the patient's BMI with her  The BMI is above average  BMI counseling and education was provided to the patient  Nutrition recommendations include decreasing overall calorie intake  Exercise recommendations include moderate aerobic physical activity for 150 minutes/week       Review of Systems   Constitutional: Negative for fever  Respiratory: Negative for shortness of breath  Cardiovascular: Negative for chest pain  Objective:      /82   Pulse 90   Resp 14   Ht 5' (1 524 m)   Wt 72 7 kg (160 lb 6 oz)   BMI 31 32 kg/m²          Physical Exam   Constitutional: She appears well-developed and well-nourished  Cardiovascular: Normal rate, regular rhythm and normal heart sounds  Pulmonary/Chest: Effort normal and breath sounds normal    Musculoskeletal:   Bilateral pain on palpation along the plantar fascia  Foot exam otherwise unremarkable

## 2019-07-09 ENCOUNTER — TELEPHONE (OUTPATIENT)
Dept: FAMILY MEDICINE CLINIC | Facility: MEDICAL CENTER | Age: 54
End: 2019-07-09

## 2019-07-09 DIAGNOSIS — M77.32 HEEL SPUR, LEFT: Primary | ICD-10-CM

## 2019-07-09 NOTE — TELEPHONE ENCOUNTER
Left foot x-ray does show a heel spur  Right foot x-ray unremarkable  I recommend referral to Podiatry for further evaluation of the heel spur  Otherwise patient's pain likely secondary to plantar fasciitis

## 2019-07-09 NOTE — TELEPHONE ENCOUNTER
Tried to leave info on her vm but it keeps cutting me off   Asked her to call back   Dr Micheline Rhoades- [phone]

## 2019-07-10 ENCOUNTER — OFFICE VISIT (OUTPATIENT)
Dept: OBGYN CLINIC | Facility: CLINIC | Age: 54
End: 2019-07-10
Payer: COMMERCIAL

## 2019-07-10 VITALS
WEIGHT: 155 LBS | HEIGHT: 61 IN | BODY MASS INDEX: 29.27 KG/M2 | DIASTOLIC BLOOD PRESSURE: 88 MMHG | SYSTOLIC BLOOD PRESSURE: 124 MMHG

## 2019-07-10 DIAGNOSIS — Z12.31 ENCOUNTER FOR SCREENING MAMMOGRAM FOR MALIGNANT NEOPLASM OF BREAST: ICD-10-CM

## 2019-07-10 DIAGNOSIS — Z11.51 SCREENING FOR HPV (HUMAN PAPILLOMAVIRUS): ICD-10-CM

## 2019-07-10 DIAGNOSIS — Z12.4 SCREENING FOR MALIGNANT NEOPLASM OF CERVIX: ICD-10-CM

## 2019-07-10 DIAGNOSIS — R30.0 DYSURIA: ICD-10-CM

## 2019-07-10 DIAGNOSIS — Z01.419 WELL FEMALE EXAM WITH ROUTINE GYNECOLOGICAL EXAM: Primary | ICD-10-CM

## 2019-07-10 LAB
SL AMB  POCT GLUCOSE, UA: NORMAL
SL AMB LEUKOCYTE ESTERASE,UA: NORMAL
SL AMB POCT BILIRUBIN,UA: NORMAL
SL AMB POCT BLOOD,UA: NORMAL
SL AMB POCT CLARITY,UA: NORMAL
SL AMB POCT COLOR,UA: YELLOW
SL AMB POCT KETONES,UA: NORMAL
SL AMB POCT NITRITE,UA: NORMAL
SL AMB POCT PH,UA: 5.5
SL AMB POCT SPECIFIC GRAVITY,UA: 1.03
SL AMB POCT URINE PROTEIN: NORMAL
SL AMB POCT UROBILINOGEN: NORMAL

## 2019-07-10 PROCEDURE — G0145 SCR C/V CYTO,THINLAYER,RESCR: HCPCS | Performed by: OBSTETRICS & GYNECOLOGY

## 2019-07-10 PROCEDURE — 99386 PREV VISIT NEW AGE 40-64: CPT | Performed by: OBSTETRICS & GYNECOLOGY

## 2019-07-10 PROCEDURE — 87624 HPV HI-RISK TYP POOLED RSLT: CPT | Performed by: OBSTETRICS & GYNECOLOGY

## 2019-07-10 PROCEDURE — 81003 URINALYSIS AUTO W/O SCOPE: CPT | Performed by: OBSTETRICS & GYNECOLOGY

## 2019-07-10 NOTE — PROGRESS NOTES
ASSESSMENT & PLAN: Janice Borges is a 47 y o  [id number] with normal gynecologic exam     1   Routine well woman exam done today  2   Pap and HPV: Pap with HPV was done today  Current ASCCP Guidelines reviewed  3   Mammogram up to date, ordered by PCP  Recommend yearly mammography  4   Colonoscopy recommended per guidelines  5  The patient is sexually active  6  LLQ pain, increased frequency: UA negative  ? Kidney stone  Normal GYN exam, does not appear to be GYN in origin  Instructed pt to f/up with PCP, otherwise continue supportive care at this time with tylenol/motrin PRN and hydration  7  The following were reviewed in today's visit: breast self exam, menopause, exercise and healthy diet  8  Patient to return to office in 12 months for annual exam      All questions have been answered to her satisfaction  CC:  Annual Gynecologic Examination    HPI: Janice Borges is a 47 y o  [id number] who presents for annual gynecologic examination  She has the following concerns:  LLQ pain  States it started about 3 days ago, is constant, radiates to her back  Mostly tender on her L flank  Increased urinary frequency  She occasionally reports hot flashes, night sweats, other symptoms of menopause  She does not report urinary incontinence  She does not report postmenopausal bleeding  Health Maintenance:    She exercises 0 days per week  She wears her seatbelt routinely  She does perform regular monthly self breast exams  She feels safe at home  She does follow a balanced diet  She does not use tobacco  Last mammogram: 6/2019 [facility]  Last colonoscopy: never had one    Past Medical History:   Diagnosis Date    Fibroids     History of ovarian cyst     Hypertension     Migraine     Thyroid nodule        Past Surgical History:   Procedure Laterality Date    ENDOMETRIAL BIOPSY      W/o cervical dilation   Resolved 9/30/2014      TONSILLECTOMY      Age 6      TUBAL LIGATION Past OB/Gyn History:  Period Cycle (Days): (n/a post menopausal )No LMP recorded (approximate)   Patient is postmenopausal     Patient is currently sexually active: heterosexual  Patient is postmenopausal     Last Pap  2014: normal per patient  Denies history of abnormal pap tests    OB History    Para Term  AB Living   3 3 3 0 0 3   SAB TAB Ectopic Multiple Live Births   0 0 0 0 3      # Outcome Date GA Lbr Juarez/2nd Weight Sex Delivery Anes PTL Lv   3 Term 88    M Vag-Spont   DEONDRE   2 Term 88    F Vag-Spont   DEONDRE   1 Term 86    Mertha Danika Vag-Spont   DEONDRE      Obstetric Comments   Menarche 10    Post menopausal in 2015 @ age 51       Family History:  Family History   Problem Relation Age of Onset    Arthritis Mother     Diverticulitis Mother     Heart disease Mother     Hypertension Mother     Migraines Mother     Thyroid disease Mother     Arthritis Maternal Grandmother     Diabetes Maternal Grandmother     Heart disease Maternal Grandmother     Varicose Veins Maternal Grandmother     Heart attack Maternal Grandmother     Colon cancer Maternal Grandfather 58    Diabetes Paternal Grandmother     Heart disease Paternal Grandmother     Heart attack Paternal Grandmother     Heart disease Father     Heart disease Sister     Hypertension Sister     No Known Problems Daughter     Clotting disorder Paternal Grandfather     Heart disease Brother     Heart murmur Brother     No Known Problems Son     No Known Problems Son     Breast cancer Other        Social History:  Social History     Socioeconomic History    Marital status:      Spouse name: Not on file    Number of children: Not on file    Years of education: Not on file    Highest education level: Not on file   Occupational History    Not on file   Social Needs    Financial resource strain: Not on file    Food insecurity:     Worry: Not on file     Inability: Not on file    Transportation needs: Medical: Not on file     Non-medical: Not on file   Tobacco Use    Smoking status: Never Smoker    Smokeless tobacco: Never Used   Substance and Sexual Activity    Alcohol use: Yes     Comment: occasional    Drug use: Never    Sexual activity: Yes     Partners: Male     Birth control/protection: Post-menopausal, Female Sterilization   Lifestyle    Physical activity:     Days per week: Not on file     Minutes per session: Not on file    Stress: Not on file   Relationships    Social connections:     Talks on phone: Not on file     Gets together: Not on file     Attends Mandaen service: Not on file     Active member of club or organization: Not on file     Attends meetings of clubs or organizations: Not on file     Relationship status: Not on file    Intimate partner violence:     Fear of current or ex partner: Not on file     Emotionally abused: Not on file     Physically abused: Not on file     Forced sexual activity: Not on file   Other Topics Concern    Not on file   Social History Narrative    Marital history: Currently  - As per [employer]      Presently lives with her grandson  Patient is single  Patient is currently employed  Allergies:  No Known Allergies    Medications:    Current Outpatient Medications:     atorvastatin (LIPITOR) 40 mg tablet, Take 1 tablet (40 mg total) by mouth daily with dinner, Disp: 90 tablet, Rfl: 3    hydrochlorothiazide (HYDRODIURIL) 25 mg tablet, Take 1 tablet (25 mg total) by mouth daily, Disp: 90 tablet, Rfl: 3    metoprolol tartrate (LOPRESSOR) 25 mg tablet, Take 1 tablet (25 mg total) by mouth every 12 (twelve) hours, Disp: 180 tablet, Rfl: 3    Review of Systems:  Denies fevers, chills, unintentional weight loss, excessive fatigue, chest pain, shortness of breath, abdominal pain, nausea, vomiting, urinary incontinence, urinary frequency, vaginal bleeding, vaginal discharge  All other systems negative unless otherwise stated       Physical Exam:  BP 124/88   Ht 5' 1" (1 549 m)   Wt 70 3 kg (155 lb)   LMP  (Approximate) Comment: 2015  Breastfeeding? No   BMI 29 29 kg/m²  Body mass index is 29 29 kg/m²  GEN: The patient was alert and oriented x3, pleasant well-appearing female in no acute distress  HEENT:  Unremarkable, no anterior or posterior lymphadenopathy, no thyromegaly  CV:  Regular rate and rhythm, normal S1 and S2, no murmurs  RESP:  Clear to auscultation bilaterally, no wheezes, rales or rhonchi  BREAST:  Symmetric breasts with no palpable breast masses or obvious breast lesions  She has no retractions or nipple discharge  She has no axillary abnormalities or palpable masses  GI:  Soft, nontender, non-distended  MSK: bilateral lower extremities are nontender, no edema  : Normal appearing external female genitalia, normal appearing urethral meatus  On sterile speculum exam, atrophic appearing vaginal epithelium, no vaginal discharge, no bleeding, grossly normal appearing cervix  On bimanual exam, no cervical motion tenderness; uterus is smooth, mobile, nontender 6 weeks size  No tenderness or fullness in the bilateral adnexa

## 2019-07-12 LAB
HPV HR 12 DNA CVX QL NAA+PROBE: NEGATIVE
HPV16 DNA CVX QL NAA+PROBE: NEGATIVE
HPV18 DNA CVX QL NAA+PROBE: NEGATIVE

## 2019-07-15 LAB
LAB AP GYN PRIMARY INTERPRETATION: NORMAL
Lab: NORMAL

## 2019-10-24 ENCOUNTER — APPOINTMENT (OUTPATIENT)
Dept: LAB | Facility: MEDICAL CENTER | Age: 54
End: 2019-10-24
Payer: COMMERCIAL

## 2019-10-24 DIAGNOSIS — R07.9 CHEST PAIN, UNSPECIFIED TYPE: ICD-10-CM

## 2019-10-24 LAB
CHOLEST SERPL-MCNC: 196 MG/DL (ref 50–200)
HDLC SERPL-MCNC: 69 MG/DL
LDLC SERPL CALC-MCNC: 102 MG/DL (ref 0–100)
TRIGL SERPL-MCNC: 123 MG/DL

## 2019-10-24 PROCEDURE — 80061 LIPID PANEL: CPT

## 2019-10-24 PROCEDURE — 36415 COLL VENOUS BLD VENIPUNCTURE: CPT

## 2019-10-25 ENCOUNTER — TELEPHONE (OUTPATIENT)
Dept: CARDIOLOGY CLINIC | Facility: CLINIC | Age: 54
End: 2019-10-25

## 2019-10-25 NOTE — TELEPHONE ENCOUNTER
----- Message from Twin Kulkarni MD sent at 10/25/2019  8:55 AM EDT -----  Lipids were good, significantly improved from before  Continue same    Please let her know

## 2020-01-06 ENCOUNTER — OFFICE VISIT (OUTPATIENT)
Dept: FAMILY MEDICINE CLINIC | Facility: MEDICAL CENTER | Age: 55
End: 2020-01-06
Payer: COMMERCIAL

## 2020-01-06 ENCOUNTER — APPOINTMENT (OUTPATIENT)
Dept: LAB | Facility: MEDICAL CENTER | Age: 55
End: 2020-01-06
Payer: COMMERCIAL

## 2020-01-06 VITALS
BODY MASS INDEX: 28.7 KG/M2 | HEIGHT: 61 IN | RESPIRATION RATE: 16 BRPM | HEART RATE: 80 BPM | DIASTOLIC BLOOD PRESSURE: 64 MMHG | SYSTOLIC BLOOD PRESSURE: 126 MMHG | WEIGHT: 152 LBS

## 2020-01-06 DIAGNOSIS — Z11.4 SCREENING FOR HIV (HUMAN IMMUNODEFICIENCY VIRUS): ICD-10-CM

## 2020-01-06 DIAGNOSIS — Z11.59 NEED FOR HEPATITIS C SCREENING TEST: ICD-10-CM

## 2020-01-06 DIAGNOSIS — Z00.00 PHYSICAL EXAM, ANNUAL: Primary | ICD-10-CM

## 2020-01-06 DIAGNOSIS — I10 ESSENTIAL HYPERTENSION: ICD-10-CM

## 2020-01-06 DIAGNOSIS — R91.1 LUNG NODULE: ICD-10-CM

## 2020-01-06 DIAGNOSIS — E78.00 ELEVATED CHOLESTEROL: ICD-10-CM

## 2020-01-06 DIAGNOSIS — Z12.11 SCREENING FOR COLON CANCER: ICD-10-CM

## 2020-01-06 LAB
ALBUMIN SERPL BCP-MCNC: 3.6 G/DL (ref 3.5–5)
ALP SERPL-CCNC: 100 U/L (ref 46–116)
ALT SERPL W P-5'-P-CCNC: 26 U/L (ref 12–78)
ANION GAP SERPL CALCULATED.3IONS-SCNC: 4 MMOL/L (ref 4–13)
AST SERPL W P-5'-P-CCNC: 15 U/L (ref 5–45)
BILIRUB SERPL-MCNC: 0.19 MG/DL (ref 0.2–1)
BUN SERPL-MCNC: 17 MG/DL (ref 5–25)
CALCIUM SERPL-MCNC: 9.3 MG/DL (ref 8.3–10.1)
CHLORIDE SERPL-SCNC: 105 MMOL/L (ref 100–108)
CHOLEST SERPL-MCNC: 225 MG/DL (ref 50–200)
CO2 SERPL-SCNC: 30 MMOL/L (ref 21–32)
CREAT SERPL-MCNC: 0.9 MG/DL (ref 0.6–1.3)
CREAT UR-MCNC: 99.4 MG/DL
GFR SERPL CREATININE-BSD FRML MDRD: 73 ML/MIN/1.73SQ M
GLUCOSE P FAST SERPL-MCNC: 97 MG/DL (ref 65–99)
HCV AB SER QL: NORMAL
HDLC SERPL-MCNC: 62 MG/DL
LDLC SERPL CALC-MCNC: 101 MG/DL (ref 0–100)
MICROALBUMIN UR-MCNC: <5 MG/L (ref 0–20)
MICROALBUMIN/CREAT 24H UR: <5 MG/G CREATININE (ref 0–30)
POTASSIUM SERPL-SCNC: 3.6 MMOL/L (ref 3.5–5.3)
PROT SERPL-MCNC: 7.2 G/DL (ref 6.4–8.2)
SODIUM SERPL-SCNC: 139 MMOL/L (ref 136–145)
TRIGL SERPL-MCNC: 312 MG/DL

## 2020-01-06 PROCEDURE — 36415 COLL VENOUS BLD VENIPUNCTURE: CPT

## 2020-01-06 PROCEDURE — 80053 COMPREHEN METABOLIC PANEL: CPT

## 2020-01-06 PROCEDURE — 82043 UR ALBUMIN QUANTITATIVE: CPT | Performed by: FAMILY MEDICINE

## 2020-01-06 PROCEDURE — 86803 HEPATITIS C AB TEST: CPT

## 2020-01-06 PROCEDURE — 80061 LIPID PANEL: CPT

## 2020-01-06 PROCEDURE — 82570 ASSAY OF URINE CREATININE: CPT | Performed by: FAMILY MEDICINE

## 2020-01-06 PROCEDURE — 99396 PREV VISIT EST AGE 40-64: CPT | Performed by: FAMILY MEDICINE

## 2020-01-06 PROCEDURE — 87389 HIV-1 AG W/HIV-1&-2 AB AG IA: CPT

## 2020-01-06 NOTE — PROGRESS NOTES
Assessment/Plan:    No problem-specific Assessment & Plan notes found for this encounter  Diagnoses and all orders for this visit:    Physical exam, annual  81-year-old female presenting for a physical exam   Continue regular follow-up with Gynecology and Cardiology  BMI Counseling: Body mass index is 28 72 kg/m²  The BMI is above normal  Nutrition recommendations include decreasing overall calorie intake  Exercise recommendations include moderate aerobic physical activity for 150 minutes/week  Lung nodule  -     CT chest wo contrast; Future  Lung nodule on previous CT scan from May of 2019  Patient is higher risk for lung cancer due to his secondhand smoke exposure  Order placed for repeat CT scan in May of 2020  Screening for colon cancer  -     Ambulatory referral to Gastroenterology; Future  Referral to Gastroenterology for colon cancer screening  Screening for HIV (human immunodeficiency virus)  -     HIV 1/2 AG-AB combo; Future  Need for hepatitis C screening test  -     Hepatitis C antibody; Future  Patient did give verbal consent for hep C and HIV screening  Follow-up in one year sooner if needed  Subjective:      Patient ID: Maribel Ruiz is a 49 y o  female  Patient presents for a physical exam   She admits to a poor diet and lack of exercise  She does not smoke  Does drink alcohol occasionally  No drug use  She does have a gynecologist and has had a Pap smear as well as a mammogram   Due for her next mammogram in June  She does have a lung nodule and is wondering when her next scan is due  Patient is followed by Cardiology  No acute concerns  Has not yet had a colonoscopy  Agreeable to labs  Did have her flu vaccine  The following portions of the patient's history were reviewed and updated as appropriate:   She  has a past medical history of Fibroids, History of ovarian cyst, Hypertension, Migraine, and Thyroid nodule    She   Patient Active Problem List    Diagnosis Date Noted    Heel spur, left 07/09/2019    Hypertension 64/98/2000    Diastolic dysfunction 16/81/1634    Elevated cholesterol 05/08/2019    Second hand smoke exposure 05/08/2019    Lung nodule 05/08/2019     She  has a past surgical history that includes Endometrial biopsy; Tonsillectomy; and Tubal ligation  Her family history includes Arthritis in her maternal grandmother and mother; Breast cancer in her other; Clotting disorder in her paternal grandfather; Colon cancer (age of onset: 58) in her maternal grandfather; Diabetes in her maternal grandmother and paternal grandmother; Diverticulitis in her mother; Heart attack in her maternal grandmother and paternal grandmother; Heart disease in her brother, father, maternal grandmother, mother, paternal grandmother, and sister; Heart murmur in her brother; Hypertension in her mother and sister; Migraines in her mother; No Known Problems in her daughter, son, and son; Thyroid disease in her mother; Varicose Veins in her maternal grandmother  She  reports that she has never smoked  She has never used smokeless tobacco  She reports that she drinks alcohol  She reports that she does not use drugs  Current Outpatient Medications   Medication Sig Dispense Refill    atorvastatin (LIPITOR) 40 mg tablet Take 1 tablet (40 mg total) by mouth daily with dinner 90 tablet 3    hydrochlorothiazide (HYDRODIURIL) 25 mg tablet Take 1 tablet (25 mg total) by mouth daily 90 tablet 3    metoprolol tartrate (LOPRESSOR) 25 mg tablet Take 1 tablet (25 mg total) by mouth every 12 (twelve) hours 180 tablet 3     No current facility-administered medications for this visit        Current Outpatient Medications on File Prior to Visit   Medication Sig    atorvastatin (LIPITOR) 40 mg tablet Take 1 tablet (40 mg total) by mouth daily with dinner    hydrochlorothiazide (HYDRODIURIL) 25 mg tablet Take 1 tablet (25 mg total) by mouth daily    metoprolol tartrate (LOPRESSOR) 25 mg tablet Take 1 tablet (25 mg total) by mouth every 12 (twelve) hours     No current facility-administered medications on file prior to visit  She has No Known Allergies       Review of Systems   Constitutional: Negative for fever  Respiratory: Negative for shortness of breath  Cardiovascular: Negative for chest pain  Gastrointestinal: Negative for abdominal pain and blood in stool  Genitourinary: Negative for dysuria and hematuria  Musculoskeletal: Negative for arthralgias and myalgias  Skin: Negative for rash  Neurological: Negative for headaches  Objective:      /64 (BP Location: Left arm, Patient Position: Sitting, Cuff Size: Adult)   Pulse 80   Resp 16   Ht 5' 1" (1 549 m)   Wt 68 9 kg (152 lb)   BMI 28 72 kg/m²          Physical Exam   Constitutional: She is oriented to person, place, and time  Vital signs are normal  She appears well-developed and well-nourished  HENT:   Head: Normocephalic and atraumatic  Right Ear: Tympanic membrane, external ear and ear canal normal    Left Ear: Tympanic membrane, external ear and ear canal normal    Nose: Nose normal    Mouth/Throat: Uvula is midline, oropharynx is clear and moist and mucous membranes are normal    Eyes: Pupils are equal, round, and reactive to light  Conjunctivae, EOM and lids are normal    Neck: Trachea normal  Neck supple  No thyromegaly present  Cardiovascular: Normal rate, regular rhythm, S1 normal and S2 normal    No murmur heard  Pulmonary/Chest: Effort normal and breath sounds normal    Abdominal: Soft  Bowel sounds are normal  There is no tenderness  Lymphadenopathy:     She has no cervical adenopathy  Neurological: She is alert and oriented to person, place, and time  No cranial nerve deficit  Skin: Skin is warm, dry and intact  Psychiatric: She has a normal mood and affect   Her speech is normal and behavior is normal  Thought content normal

## 2020-01-07 LAB — HIV 1+2 AB+HIV1 P24 AG SERPL QL IA: NORMAL

## 2020-01-08 ENCOUNTER — TELEPHONE (OUTPATIENT)
Dept: FAMILY MEDICINE CLINIC | Facility: MEDICAL CENTER | Age: 55
End: 2020-01-08

## 2020-01-08 NOTE — TELEPHONE ENCOUNTER
Patient aware  She said that she is taking her cholesterol medication as prescribed  She has to call today to set up an appointment with cardiology so she will make sure she has them take a look at her labs as well

## 2020-01-08 NOTE — TELEPHONE ENCOUNTER
----- Message from Caroll Frankel, DO sent at 1/8/2020  9:08 AM EST -----  Labs reviewed  Hep C and HIV negative  Cholesterol remains elevated  Is patient taking her cholesterol medication that is prescribed by her cardiologist?  Remainder of labs unremarkable

## 2020-01-30 ENCOUNTER — OFFICE VISIT (OUTPATIENT)
Dept: CARDIOLOGY CLINIC | Facility: MEDICAL CENTER | Age: 55
End: 2020-01-30
Payer: COMMERCIAL

## 2020-01-30 ENCOUNTER — DOCUMENTATION (OUTPATIENT)
Dept: CARDIOLOGY CLINIC | Facility: CLINIC | Age: 55
End: 2020-01-30

## 2020-01-30 VITALS
DIASTOLIC BLOOD PRESSURE: 80 MMHG | HEIGHT: 61 IN | WEIGHT: 152.1 LBS | HEART RATE: 82 BPM | SYSTOLIC BLOOD PRESSURE: 130 MMHG | OXYGEN SATURATION: 98 % | BODY MASS INDEX: 28.72 KG/M2

## 2020-01-30 DIAGNOSIS — I10 ESSENTIAL HYPERTENSION: Primary | ICD-10-CM

## 2020-01-30 DIAGNOSIS — I51.89 DIASTOLIC DYSFUNCTION: ICD-10-CM

## 2020-01-30 DIAGNOSIS — I20.0 UNSTABLE ANGINA (HCC): ICD-10-CM

## 2020-01-30 DIAGNOSIS — E78.5 HYPERLIPIDEMIA, UNSPECIFIED HYPERLIPIDEMIA TYPE: ICD-10-CM

## 2020-01-30 PROCEDURE — 3075F SYST BP GE 130 - 139MM HG: CPT | Performed by: INTERNAL MEDICINE

## 2020-01-30 PROCEDURE — 3079F DIAST BP 80-89 MM HG: CPT | Performed by: INTERNAL MEDICINE

## 2020-01-30 PROCEDURE — 99214 OFFICE O/P EST MOD 30 MIN: CPT | Performed by: INTERNAL MEDICINE

## 2020-01-30 PROCEDURE — 93000 ELECTROCARDIOGRAM COMPLETE: CPT | Performed by: INTERNAL MEDICINE

## 2020-01-30 RX ORDER — CLOPIDOGREL BISULFATE 75 MG/1
75 TABLET ORAL DAILY
Qty: 14 TABLET | Refills: 3 | Status: SHIPPED | OUTPATIENT
Start: 2020-01-30 | End: 2020-02-04 | Stop reason: HOSPADM

## 2020-01-30 RX ORDER — ASPIRIN 325 MG
325 TABLET, DELAYED RELEASE (ENTERIC COATED) ORAL DAILY
Qty: 30 TABLET | Refills: 0 | Status: SHIPPED | OUTPATIENT
Start: 2020-01-30 | End: 2020-02-04 | Stop reason: HOSPADM

## 2020-01-30 NOTE — PROGRESS NOTES
Cardiology Follow Up    Paul Hobson  1965  [id number]  [facility] [place]  [facility]  [address]  [phone]  [phone]    1  Essential hypertension  POCT ECG   2  Diastolic dysfunction  POCT ECG   3  Unstable angina (HCC)  clopidogrel (PLAVIX) 75 mg tablet    Basic metabolic panel    CBC    Protime-INR    APTT    aspirin (ECOTRIN) 325 mg EC tablet   4  Hyperlipidemia, unspecified hyperlipidemia type         Diagnoses and all orders for this visit:    Essential hypertension  -     POCT ECG    Diastolic dysfunction  -     POCT ECG    Unstable angina (HCC)  -     clopidogrel (PLAVIX) 75 mg tablet; Take 1 tablet (75 mg total) by mouth daily  -     Basic metabolic panel  -     CBC  -     Protime-INR  -     APTT  -     aspirin (ECOTRIN) 325 mg EC tablet; Take 1 tablet (325 mg total) by mouth daily    Hyperlipidemia, unspecified hyperlipidemia type      I had the pleasure of seeing Paul Hobson for a follow up visit  INTERVAL HISTORY: chest pains    History of the presenting illness, Discussion/Summary and My Plan are as follows:::    She is a very pleasant 71-year-old lady with a history of hypertension and dyslipidemia-both untreated till 2019, no diabetes, no personal history of tobacco use, some passive exposure to smoke, who had not sought any medical attention for some time and then got medical insurance and sought attention for chest pains, evaluated by Dr Leal, found to be hypertensive and sent to the ER where blood pressure was elevated even further to 240/114 in May 2019  At that point evaluation for chest pains was negative, she had a CT scan an echocardiogram and subsequently a pharmacologic stress test all of which were essentially unremarkable  At the office visit, her chest pains were clearly reproducible to palpation and no further testing was performed      At this visit she complains of chest pains for the last month or 2, felt as heaviness in the left chest, with radiation to the left arm and to the left side of neck, associated with exertion, nausea, shortness of breath and sweating, present mostly at work-she has a physically demanding job at [employer]  Also complains of fatigue, gets only about 3-4 hours of sleep at night  Has a lot of stress in life      She does have a strong  family history of premature vascular disease-heart attack in her father in his 45s, who was a nonsmoker and nondiabetic, her brother had a mild heart attack-he is a current smoker, mother who is a diabetic also has premature vascular disease as well as significant hyperlipidemia and follows with Dr Cheney      ECG today shows normal sinus rhythm with PVCs  Cardiac physical exam is also unremarkable        On a statin, her lipids have significantly improved from 192-101       Plan:     Chest pain:  At this visit, suggestive of unstable angina-see description above, will proceed directly with coronary angiography  She will start aspirin 325 mg daily as well as Plavix 75 mg daily, procedure will be set up for Tuesday-2/3/2020  Will check basic preprocedure blood work  Risk factors  include  lifelong elevation in lipids, till recently uncontrolled hypertension and heavy exposure to passive tobacco smoke from multiple family members    Fatigue: If coronary angiography is negative, will check a sleep study for sleep apnea  Also has a lot of stress that is probably impeding restful sleep      Hypertension:  currently well controlled on hydrochlorothiazide and metoprolol, no changes at this time     Dyslipidemia:  Now on a statin, lipids have improved, await coronary angiography results      follow-up in 1-2 months      Results for Wilma Serra (MRN [id number]) as of 1/30/2020 16:23   Ref   Range 5/3/2019 05:05 10/24/2019 09:02 1/6/2020 15:31   Cholesterol Latest Ref Range: 50 - 200 mg/dL 280 (H) 196 225 (H)   Triglycerides Latest Ref Range: <=150 mg/dL 61 123 312 (H)   HDL Latest Ref Range: >=40 mg/dL 76 (H) 69 62   LDL Direct Latest Ref Range: 0 - 100 mg/dL 192 (H) 102 (H) 101 (H)       Patient Active Problem List   Diagnosis    Diastolic dysfunction    Elevated cholesterol    Second hand smoke exposure    Lung nodule    Hypertension    Heel spur, left    Unstable angina (HCC)    Hyperlipidemia     Past Medical History:   Diagnosis Date    Fibroids     History of ovarian cyst     Hypertension     Migraine     Thyroid nodule      Social History     Socioeconomic History    Marital status:      Spouse name: Not on file    Number of children: Not on file    Years of education: Not on file    Highest education level: Not on file   Occupational History    Not on file   Social Needs    Financial resource strain: Not on file    Food insecurity:     Worry: Not on file     Inability: Not on file    Transportation needs:     Medical: Not on file     Non-medical: Not on file   Tobacco Use    Smoking status: Never Smoker    Smokeless tobacco: Never Used   Substance and Sexual Activity    Alcohol use: Yes     Comment: occasional    Drug use: Never    Sexual activity: Yes     Partners: Male     Birth control/protection: Post-menopausal, Female Sterilization   Lifestyle    Physical activity:     Days per week: Not on file     Minutes per session: Not on file    Stress: Not on file   Relationships    Social connections:     Talks on phone: Not on file     Gets together: Not on file     Attends Sikhism service: Not on file     Active member of club or organization: Not on file     Attends meetings of clubs or organizations: Not on file     Relationship status: Not on file    Intimate partner violence:     Fear of current or ex partner: Not on file     Emotionally abused: Not on file     Physically abused: Not on file     Forced sexual activity: Not on file   Other Topics Concern  Not on file   Social History Narrative    Marital history: Currently  - As per [employer]       Family History   Problem Relation Age of Onset    Arthritis Mother     Diverticulitis Mother     Heart disease Mother     Hypertension Mother     Migraines Mother     Thyroid disease Mother     Arthritis Maternal Grandmother     Diabetes Maternal Grandmother     Heart disease Maternal Grandmother     Varicose Veins Maternal Grandmother     Heart attack Maternal Grandmother     Colon cancer Maternal Grandfather 58    Diabetes Paternal Grandmother     Heart disease Paternal Grandmother     Heart attack Paternal Grandmother     Heart disease Father     Heart disease Sister     Hypertension Sister     No Known Problems Daughter     Clotting disorder Paternal Grandfather     Heart disease Brother     Heart murmur Brother     No Known Problems Son     No Known Problems Son     Breast cancer Other      Past Surgical History:   Procedure Laterality Date    ENDOMETRIAL BIOPSY      W/o cervical dilation  Resolved 9/30/2014      TONSILLECTOMY      Age 6      TUBAL LIGATION         Current Outpatient Medications:     atorvastatin (LIPITOR) 40 mg tablet, Take 1 tablet (40 mg total) by mouth daily with dinner, Disp: 90 tablet, Rfl: 3    hydrochlorothiazide (HYDRODIURIL) 25 mg tablet, Take 1 tablet (25 mg total) by mouth daily, Disp: 90 tablet, Rfl: 3    metoprolol tartrate (LOPRESSOR) 25 mg tablet, Take 1 tablet (25 mg total) by mouth every 12 (twelve) hours, Disp: 180 tablet, Rfl: 3    aspirin (ECOTRIN) 325 mg EC tablet, Take 1 tablet (325 mg total) by mouth daily, Disp: 30 tablet, Rfl: 0    clopidogrel (PLAVIX) 75 mg tablet, Take 1 tablet (75 mg total) by mouth daily, Disp: 14 tablet, Rfl: 3  No Known Allergies    Imaging: No results found      Review of Systems:  Review of Systems    Physical Exam:  /80 (BP Location: Left arm, Patient Position: Sitting, Cuff Size: Adult)   Pulse 82 Ht 5' 1" (1 549 m)   Wt 69 kg (152 lb 1 6 oz)   SpO2 98%   BMI 28 74 kg/m²   Physical Exam   Constitutional: She appears well-developed and well-nourished  Non-toxic appearance  She does not appear ill  HENT:   Head: Normocephalic  Mouth/Throat: Oropharynx is clear and moist  No oropharyngeal exudate or posterior oropharyngeal edema  Eyes: Pupils are equal, round, and reactive to light  Neck: Normal range of motion  No hepatojugular reflux and no JVD present  No tracheal deviation present  No thyromegaly present  Cardiovascular: Normal rate, regular rhythm and intact distal pulses  Pulmonary/Chest: Effort normal and breath sounds normal  No accessory muscle usage  No tachypnea and no bradypnea  No respiratory distress  She has no decreased breath sounds  She has no wheezes  She has no rales  She exhibits no mass, no tenderness, no laceration and no crepitus  Abdominal: Soft  She exhibits no distension and no mass  There is no tenderness  There is no guarding  Musculoskeletal:        Right lower leg: Normal  She exhibits no edema  Left lower leg: She exhibits no edema  Skin: Skin is warm  No rash noted  She is not diaphoretic  No erythema  No pallor

## 2020-01-30 NOTE — PROGRESS NOTES
TO WHOM IT MAY CONCERN    [facility]ULAR [facility] [place]  [address] [facility] [address]      NAME: Yariel Crawley (YOB: 1965)    Yariel Crawley is under my care, she was seen in the office on 1/30/2020 and will be undergoing a procedure on 2/4/2020  She may be excused from work on 2/4/2020 and 2/5/2020 at this time, pending results of her testing  Please call with any questions or concerns        Sincerely,    MD Mirtha Rincon  [facility]

## 2020-01-30 NOTE — H&P (VIEW-ONLY)
Cardiology Follow Up    Narciso Doshi  1965  [id number]  [facility] [place]  [facility]  [address]  [phone]    1  Essential hypertension  POCT ECG   2  Diastolic dysfunction  POCT ECG   3  Unstable angina (HCC)  clopidogrel (PLAVIX) 75 mg tablet    Basic metabolic panel    CBC    Protime-INR    APTT    aspirin (ECOTRIN) 325 mg EC tablet   4  Hyperlipidemia, unspecified hyperlipidemia type         Diagnoses and all orders for this visit:    Essential hypertension  -     POCT ECG    Diastolic dysfunction  -     POCT ECG    Unstable angina (HCC)  -     clopidogrel (PLAVIX) 75 mg tablet; Take 1 tablet (75 mg total) by mouth daily  -     Basic metabolic panel  -     CBC  -     Protime-INR  -     APTT  -     aspirin (ECOTRIN) 325 mg EC tablet; Take 1 tablet (325 mg total) by mouth daily    Hyperlipidemia, unspecified hyperlipidemia type      I had the pleasure of seeing Narciso Doshi for a follow up visit  INTERVAL HISTORY: chest pains    History of the presenting illness, Discussion/Summary and My Plan are as follows:::    She is a very pleasant 20-year-old lady with a history of hypertension and dyslipidemia-both untreated till 2019, no diabetes, no personal history of tobacco use, some passive exposure to smoke, who had not sought any medical attention for some time and then got medical insurance and sought attention for chest pains, evaluated by Dr Leal, found to be hypertensive and sent to the ER where blood pressure was elevated even further to 240/114 in May 2019  At that point evaluation for chest pains was negative, she had a CT scan an echocardiogram and subsequently a pharmacologic stress test all of which were essentially unremarkable  At the office visit, her chest pains were clearly reproducible to palpation and no further testing was performed      At this visit she complains of chest pains for the last month or 2, felt as heaviness in the left chest, with radiation to the left arm and to the left side of neck, associated with exertion, nausea, shortness of breath and sweating, present mostly at work-she has a physically demanding job at [employer]  Also complains of fatigue, gets only about 3-4 hours of sleep at night  Has a lot of stress in life      She does have a strong  family history of premature vascular disease-heart attack in her father in his 45s, who was a nonsmoker and nondiabetic, her brother had a mild heart attack-he is a current smoker, mother who is a diabetic also has premature vascular disease as well as significant hyperlipidemia and follows with Dr Cheney      ECG today shows normal sinus rhythm with PVCs  Cardiac physical exam is also unremarkable        On a statin, her lipids have significantly improved from 192-101       Plan:     Chest pain:  At this visit, suggestive of unstable angina-see description above, will proceed directly with coronary angiography  She will start aspirin 325 mg daily as well as Plavix 75 mg daily, procedure will be set up for Tuesday-2/3/2020  Will check basic preprocedure blood work  Risk factors  include  lifelong elevation in lipids, till recently uncontrolled hypertension and heavy exposure to passive tobacco smoke from multiple family members    Fatigue: If coronary angiography is negative, will check a sleep study for sleep apnea  Also has a lot of stress that is probably impeding restful sleep      Hypertension:  currently well controlled on hydrochlorothiazide and metoprolol, no changes at this time     Dyslipidemia:  Now on a statin, lipids have improved, await coronary angiography results      follow-up in 1-2 months      Results for Momo Osiris (MRN [id number]) as of 1/30/2020 16:23   Ref   Range 5/3/2019 05:05 10/24/2019 09:02 1/6/2020 15:31   Cholesterol Latest Ref Range: 50 - 200 mg/dL 280 (H) 196 225 (H)   Triglycerides Latest Ref Range: <=150 mg/dL 61 123 312 (H)   HDL Latest Ref Range: >=40 mg/dL 76 (H) 69 62   LDL Direct Latest Ref Range: 0 - 100 mg/dL 192 (H) 102 (H) 101 (H)       Patient Active Problem List   Diagnosis    Diastolic dysfunction    Elevated cholesterol    Second hand smoke exposure    Lung nodule    Hypertension    Heel spur, left    Unstable angina (HCC)    Hyperlipidemia     Past Medical History:   Diagnosis Date    Fibroids     History of ovarian cyst     Hypertension     Migraine     Thyroid nodule      Social History     Socioeconomic History    Marital status:      Spouse name: Not on file    Number of children: Not on file    Years of education: Not on file    Highest education level: Not on file   Occupational History    Not on file   Social Needs    Financial resource strain: Not on file    Food insecurity:     Worry: Not on file     Inability: Not on file    Transportation needs:     Medical: Not on file     Non-medical: Not on file   Tobacco Use    Smoking status: Never Smoker    Smokeless tobacco: Never Used   Substance and Sexual Activity    Alcohol use: Yes     Comment: occasional    Drug use: Never    Sexual activity: Yes     Partners: Male     Birth control/protection: Post-menopausal, Female Sterilization   Lifestyle    Physical activity:     Days per week: Not on file     Minutes per session: Not on file    Stress: Not on file   Relationships    Social connections:     Talks on phone: Not on file     Gets together: Not on file     Attends Hindu service: Not on file     Active member of club or organization: Not on file     Attends meetings of clubs or organizations: Not on file     Relationship status: Not on file    Intimate partner violence:     Fear of current or ex partner: Not on file     Emotionally abused: Not on file     Physically abused: Not on file     Forced sexual activity: Not on file   Other Topics Concern  Not on file   Social History Narrative    Marital history: Currently  - As per [employer]       Family History   Problem Relation Age of Onset    Arthritis Mother     Diverticulitis Mother     Heart disease Mother     Hypertension Mother     Migraines Mother     Thyroid disease Mother     Arthritis Maternal Grandmother     Diabetes Maternal Grandmother     Heart disease Maternal Grandmother     Varicose Veins Maternal Grandmother     Heart attack Maternal Grandmother     Colon cancer Maternal Grandfather 58    Diabetes Paternal Grandmother     Heart disease Paternal Grandmother     Heart attack Paternal Grandmother     Heart disease Father     Heart disease Sister     Hypertension Sister     No Known Problems Daughter     Clotting disorder Paternal Grandfather     Heart disease Brother     Heart murmur Brother     No Known Problems Son     No Known Problems Son     Breast cancer Other      Past Surgical History:   Procedure Laterality Date    ENDOMETRIAL BIOPSY      W/o cervical dilation  Resolved 9/30/2014      TONSILLECTOMY      Age 6      TUBAL LIGATION         Current Outpatient Medications:     atorvastatin (LIPITOR) 40 mg tablet, Take 1 tablet (40 mg total) by mouth daily with dinner, Disp: 90 tablet, Rfl: 3    hydrochlorothiazide (HYDRODIURIL) 25 mg tablet, Take 1 tablet (25 mg total) by mouth daily, Disp: 90 tablet, Rfl: 3    metoprolol tartrate (LOPRESSOR) 25 mg tablet, Take 1 tablet (25 mg total) by mouth every 12 (twelve) hours, Disp: 180 tablet, Rfl: 3    aspirin (ECOTRIN) 325 mg EC tablet, Take 1 tablet (325 mg total) by mouth daily, Disp: 30 tablet, Rfl: 0    clopidogrel (PLAVIX) 75 mg tablet, Take 1 tablet (75 mg total) by mouth daily, Disp: 14 tablet, Rfl: 3  No Known Allergies    Imaging: No results found      Review of Systems:  Review of Systems    Physical Exam:  /80 (BP Location: Left arm, Patient Position: Sitting, Cuff Size: Adult)   Pulse 82 Ht 5' 1" (1 549 m)   Wt 69 kg (152 lb 1 6 oz)   SpO2 98%   BMI 28 74 kg/m²   Physical Exam   Constitutional: She appears well-developed and well-nourished  Non-toxic appearance  She does not appear ill  HENT:   Head: Normocephalic  Mouth/Throat: Oropharynx is clear and moist  No oropharyngeal exudate or posterior oropharyngeal edema  Eyes: Pupils are equal, round, and reactive to light  Neck: Normal range of motion  No hepatojugular reflux and no JVD present  No tracheal deviation present  No thyromegaly present  Cardiovascular: Normal rate, regular rhythm and intact distal pulses  Pulmonary/Chest: Effort normal and breath sounds normal  No accessory muscle usage  No tachypnea and no bradypnea  No respiratory distress  She has no decreased breath sounds  She has no wheezes  She has no rales  She exhibits no mass, no tenderness, no laceration and no crepitus  Abdominal: Soft  She exhibits no distension and no mass  There is no tenderness  There is no guarding  Musculoskeletal:        Right lower leg: Normal  She exhibits no edema  Left lower leg: She exhibits no edema  Skin: Skin is warm  No rash noted  She is not diaphoretic  No erythema  No pallor

## 2020-01-31 ENCOUNTER — APPOINTMENT (OUTPATIENT)
Dept: LAB | Facility: MEDICAL CENTER | Age: 55
End: 2020-01-31
Payer: COMMERCIAL

## 2020-01-31 LAB
ANION GAP SERPL CALCULATED.3IONS-SCNC: 4 MMOL/L (ref 4–13)
APTT PPP: 29 SECONDS (ref 23–37)
BUN SERPL-MCNC: 16 MG/DL (ref 5–25)
CALCIUM SERPL-MCNC: 9.8 MG/DL (ref 8.3–10.1)
CHLORIDE SERPL-SCNC: 108 MMOL/L (ref 100–108)
CO2 SERPL-SCNC: 28 MMOL/L (ref 21–32)
CREAT SERPL-MCNC: 0.76 MG/DL (ref 0.6–1.3)
ERYTHROCYTE [DISTWIDTH] IN BLOOD BY AUTOMATED COUNT: 13.2 % (ref 11.6–15.1)
GFR SERPL CREATININE-BSD FRML MDRD: 90 ML/MIN/1.73SQ M
GLUCOSE P FAST SERPL-MCNC: 88 MG/DL (ref 65–99)
HCT VFR BLD AUTO: 41.4 % (ref 34.8–46.1)
HGB BLD-MCNC: 12.9 G/DL (ref 11.5–15.4)
INR PPP: 0.97 (ref 0.84–1.19)
MCH RBC QN AUTO: 29.3 PG (ref 26.8–34.3)
MCHC RBC AUTO-ENTMCNC: 31.2 G/DL (ref 31.4–37.4)
MCV RBC AUTO: 94 FL (ref 82–98)
PLATELET # BLD AUTO: 243 THOUSANDS/UL (ref 149–390)
PMV BLD AUTO: 10.8 FL (ref 8.9–12.7)
POTASSIUM SERPL-SCNC: 3.9 MMOL/L (ref 3.5–5.3)
PROTHROMBIN TIME: 12.5 SECONDS (ref 11.6–14.5)
RBC # BLD AUTO: 4.41 MILLION/UL (ref 3.81–5.12)
SODIUM SERPL-SCNC: 140 MMOL/L (ref 136–145)
WBC # BLD AUTO: 7.63 THOUSAND/UL (ref 4.31–10.16)

## 2020-01-31 PROCEDURE — 85610 PROTHROMBIN TIME: CPT | Performed by: INTERNAL MEDICINE

## 2020-01-31 PROCEDURE — 36415 COLL VENOUS BLD VENIPUNCTURE: CPT | Performed by: INTERNAL MEDICINE

## 2020-01-31 PROCEDURE — 85730 THROMBOPLASTIN TIME PARTIAL: CPT | Performed by: INTERNAL MEDICINE

## 2020-01-31 PROCEDURE — 80048 BASIC METABOLIC PNL TOTAL CA: CPT | Performed by: INTERNAL MEDICINE

## 2020-01-31 PROCEDURE — 85027 COMPLETE CBC AUTOMATED: CPT | Performed by: INTERNAL MEDICINE

## 2020-02-03 ENCOUNTER — TELEPHONE (OUTPATIENT)
Dept: SURGERY | Facility: HOSPITAL | Age: 55
End: 2020-02-03

## 2020-02-03 ENCOUNTER — TELEPHONE (OUTPATIENT)
Dept: CARDIOLOGY CLINIC | Facility: CLINIC | Age: 55
End: 2020-02-03

## 2020-02-03 RX ORDER — SODIUM CHLORIDE 9 MG/ML
125 INJECTION, SOLUTION INTRAVENOUS CONTINUOUS
Status: CANCELLED | OUTPATIENT
Start: 2020-02-03

## 2020-02-03 RX ORDER — ASPIRIN 81 MG/1
324 TABLET, CHEWABLE ORAL ONCE
Status: CANCELLED | OUTPATIENT
Start: 2020-02-03 | End: 2020-02-03

## 2020-02-03 NOTE — TELEPHONE ENCOUNTER
Pt is scheduled on 02/04/20 fir a LHC with Dr Juan Simmons at [facility]  Pt aware of instructions  Can I get preauth please

## 2020-02-04 ENCOUNTER — HOSPITAL ENCOUNTER (OUTPATIENT)
Dept: NON INVASIVE DIAGNOSTICS | Facility: HOSPITAL | Age: 55
Discharge: HOME | End: 2020-02-04
Attending: INTERNAL MEDICINE | Admitting: INTERNAL MEDICINE
Payer: COMMERCIAL

## 2020-02-04 VITALS
BODY MASS INDEX: 30.23 KG/M2 | TEMPERATURE: 97.8 F | DIASTOLIC BLOOD PRESSURE: 70 MMHG | RESPIRATION RATE: 18 BRPM | SYSTOLIC BLOOD PRESSURE: 118 MMHG | OXYGEN SATURATION: 97 % | HEIGHT: 60 IN | HEART RATE: 74 BPM | WEIGHT: 154 LBS

## 2020-02-04 DIAGNOSIS — I10 ESSENTIAL HYPERTENSION, MALIGNANT: ICD-10-CM

## 2020-02-04 PROCEDURE — 99153 MOD SED SAME PHYS/QHP EA: CPT | Performed by: INTERNAL MEDICINE

## 2020-02-04 PROCEDURE — 93454 CORONARY ARTERY ANGIO S&I: CPT | Performed by: INTERNAL MEDICINE

## 2020-02-04 PROCEDURE — C1894 INTRO/SHEATH, NON-LASER: HCPCS | Performed by: INTERNAL MEDICINE

## 2020-02-04 PROCEDURE — 99152 MOD SED SAME PHYS/QHP 5/>YRS: CPT | Performed by: INTERNAL MEDICINE

## 2020-02-04 PROCEDURE — C1769 GUIDE WIRE: HCPCS | Performed by: INTERNAL MEDICINE

## 2020-02-04 PROCEDURE — C1887 CATHETER, GUIDING: HCPCS | Performed by: INTERNAL MEDICINE

## 2020-02-04 RX ORDER — ASPIRIN 81 MG/1
324 TABLET, CHEWABLE ORAL ONCE
Status: DISCONTINUED | OUTPATIENT
Start: 2020-02-04 | End: 2020-02-04 | Stop reason: HOSPADM

## 2020-02-04 RX ORDER — VERAPAMIL HYDROCHLORIDE 2.5 MG/ML
INJECTION, SOLUTION INTRAVENOUS CODE/TRAUMA/SEDATION MEDICATION
Status: COMPLETED | OUTPATIENT
Start: 2020-02-04 | End: 2020-02-04

## 2020-02-04 RX ORDER — FENTANYL CITRATE 50 UG/ML
INJECTION, SOLUTION INTRAMUSCULAR; INTRAVENOUS CODE/TRAUMA/SEDATION MEDICATION
Status: COMPLETED | OUTPATIENT
Start: 2020-02-04 | End: 2020-02-04

## 2020-02-04 RX ORDER — LIDOCAINE HYDROCHLORIDE 10 MG/ML
INJECTION, SOLUTION EPIDURAL; INFILTRATION; INTRACAUDAL; PERINEURAL CODE/TRAUMA/SEDATION MEDICATION
Status: COMPLETED | OUTPATIENT
Start: 2020-02-04 | End: 2020-02-04

## 2020-02-04 RX ORDER — MIDAZOLAM HYDROCHLORIDE 2 MG/2ML
INJECTION, SOLUTION INTRAMUSCULAR; INTRAVENOUS CODE/TRAUMA/SEDATION MEDICATION
Status: COMPLETED | OUTPATIENT
Start: 2020-02-04 | End: 2020-02-04

## 2020-02-04 RX ORDER — SODIUM CHLORIDE 9 MG/ML
100 INJECTION, SOLUTION INTRAVENOUS CONTINUOUS
Status: DISCONTINUED | OUTPATIENT
Start: 2020-02-04 | End: 2020-02-04 | Stop reason: HOSPADM

## 2020-02-04 RX ORDER — HEPARIN SODIUM 1000 [USP'U]/ML
INJECTION, SOLUTION INTRAVENOUS; SUBCUTANEOUS CODE/TRAUMA/SEDATION MEDICATION
Status: COMPLETED | OUTPATIENT
Start: 2020-02-04 | End: 2020-02-04

## 2020-02-04 RX ORDER — SODIUM CHLORIDE 9 MG/ML
125 INJECTION, SOLUTION INTRAVENOUS CONTINUOUS
Status: DISCONTINUED | OUTPATIENT
Start: 2020-02-04 | End: 2020-02-04 | Stop reason: HOSPADM

## 2020-02-04 RX ORDER — NITROGLYCERIN 20 MG/100ML
INJECTION INTRAVENOUS CODE/TRAUMA/SEDATION MEDICATION
Status: COMPLETED | OUTPATIENT
Start: 2020-02-04 | End: 2020-02-04

## 2020-02-04 RX ADMIN — HEPARIN SODIUM 4000 UNITS: 1000 INJECTION INTRAVENOUS; SUBCUTANEOUS at 10:16

## 2020-02-04 RX ADMIN — VERAPAMIL HYDROCHLORIDE 2.5 MG: 2.5 INJECTION INTRAVENOUS at 10:16

## 2020-02-04 RX ADMIN — FENTANYL CITRATE 50 MCG: 50 INJECTION, SOLUTION INTRAMUSCULAR; INTRAVENOUS at 10:13

## 2020-02-04 RX ADMIN — LIDOCAINE HYDROCHLORIDE 1 ML: 10 INJECTION, SOLUTION EPIDURAL; INFILTRATION; INTRACAUDAL; PERINEURAL at 10:13

## 2020-02-04 RX ADMIN — MIDAZOLAM 2 MG: 1 INJECTION INTRAMUSCULAR; INTRAVENOUS at 10:22

## 2020-02-04 RX ADMIN — IOHEXOL 60 ML: 350 INJECTION, SOLUTION INTRAVENOUS at 10:24

## 2020-02-04 RX ADMIN — SODIUM CHLORIDE 125 ML/HR: 0.9 INJECTION, SOLUTION INTRAVENOUS at 09:16

## 2020-02-04 RX ADMIN — Medication 200 MCG: at 10:16

## 2020-02-04 NOTE — DISCHARGE INSTRUCTIONS
1  Please see the post cardiac catheterization dishcarge instructions  No heavy lifting, greater than 10 lbs  or strenuous  activity for 48 hrs  2 Remove band aid tomorrow  Shower and wash area- wrist gently with soap and water- beginning tomorrow  Rinse and pat dry  Apply new water seal band aid  Repeat this process for 5 days  No powders, creams lotions or antibiotic ointments  for 5 days  No tub baths, hot tubs or swimming for 5 days  3  Please call our office ([phone]) if you have any fever, redness, swelling, discharge from your wrist access site  4 No driving for 1 day      Left Heart Catheterization   WHAT YOU NEED TO KNOW:   A left heart catheterization is a procedure to look at your heart and its arteries  You may need this procedure if you have chest pain, heart disease, or your heart is not working as it should  DISCHARGE INSTRUCTIONS:   Follow up with your healthcare provider as directed:  Write down your questions so you remember to ask them during your visits  Limit activity as directed:   · Avoid unnecessary stair climbing for 48 hours, if a catheter was put in your groin  · Do not place pressure on your arm, hand, or wrist, if the catheter was placed in your wrist  Avoid pushing, pulling, or heavy lifting with that arm  · If you need to cough, support the area where the catheter was inserted with your hand  · Ask your healthcare provider how long you need to limit movement and avoid certain activities  · You may feel like resting more after your procedure  Slowly start to do more each day  Rest when you feel it is needed  Drink liquids as directed:  Liquids help flush the dye used for your procedure out of your body  Ask your healthcare provider how much liquid to drink each day, and which liquids to drink  Some foods, such as soup and fruit, also provide liquid  Wound care:  Ask your healthcare provider about how to care for your incision wound   Ask when you can get into a tub, shower, or pool  Contact your healthcare provider if:   · You have a fever  · The skin around your wound is red, swollen, or has pus coming from it  · You have trouble breathing, or your skin is itchy, swollen, or has a rash  · You have questions or concerns about your condition or care  Seek care immediately or call 911 if:   · The area where the catheter was placed is swollen and filled with blood or is bleeding  · The leg or arm used for the procedure becomes numb or turns white or blue  · You feel lightheaded, short of breath, and have chest pain  · You cough up blood  · You have any of the following signs of a heart attack:      ¨ Squeezing, pressure, or pain in your chest that lasts longer than 5 minutes or returns    ¨ Discomfort or pain in your back, neck, jaw, stomach, or arm     ¨ Trouble breathing    ¨ Nausea or vomiting    ¨ Lightheadedness or a sudden cold sweat, especially with chest pain or trouble breathing    · Your arm or leg feels warm, tender, and painful  It may look swollen and red  · You have any of the following signs of a stroke:     ¨ Part of your face droops or is numb    ¨ Weakness in an arm or leg    ¨ Confusion or difficulty speaking    ¨ Dizziness, a severe headache, or vision loss  © 2017 [address] [employer] Information is for End User's use only and may not be sold, redistributed or otherwise used for commercial purposes  All illustrations and images included in CareNotes® are the copyrighted property of A D A M , Inc  or Kenny Russo  The above information is an  only  It is not intended as medical advice for individual conditions or treatments  Talk to your doctor, nurse or pharmacist before following any medical regimen to see if it is safe and effective for you

## 2020-02-04 NOTE — INTERVAL H&P NOTE
H&P reviewed  After examining the patient, I find no changed to the H&P since it had been written  Patient re-evaluated   Accept as history and physical   /71 (BP Location: Right arm)   Pulse (!) 52   Temp 97 8 °F (36 6 °C) (Oral)   Resp 18   Ht 5' (1 524 m)   Wt 69 9 kg (154 lb)   SpO2 97%   BMI 30 08 kg/m²     Javon Goldberg, DO/February 4, 2020/9:59 AM

## 2020-02-10 ENCOUNTER — TELEPHONE (OUTPATIENT)
Dept: CARDIOLOGY CLINIC | Facility: CLINIC | Age: 55
End: 2020-02-10

## 2020-02-10 NOTE — TELEPHONE ENCOUNTER
P/c states she had a cathaterization on 2/4 and her arm is ecchymotic and edematous  She went to work and was sent home because of severe pain when picking up anything  Did try advil, which did not work  She would like to know if you would write her a letter to be out of work the rest of the week  Dr Analilia Santizo is off all week  Or do you want to have her seen?       Please advise

## 2020-02-12 ENCOUNTER — DOCUMENTATION (OUTPATIENT)
Dept: NON INVASIVE DIAGNOSTICS | Facility: HOSPITAL | Age: 55
End: 2020-02-12

## 2020-02-12 NOTE — PROGRESS NOTES
TO WHOM IT MAY CONCERN    [address] [address]      NAME: James Huerta (YOB: 1965)    James Huerta is under my care, he may return to work on 2/17/2020  Please call with any questions or concerns        Sincerely,    Layman MD Mirtha Ling  [facility]

## 2020-02-14 ENCOUNTER — OFFICE VISIT (OUTPATIENT)
Dept: CARDIOLOGY CLINIC | Facility: CLINIC | Age: 55
End: 2020-02-14
Payer: COMMERCIAL

## 2020-02-14 VITALS
DIASTOLIC BLOOD PRESSURE: 78 MMHG | SYSTOLIC BLOOD PRESSURE: 124 MMHG | BODY MASS INDEX: 30.23 KG/M2 | HEIGHT: 60 IN | WEIGHT: 154 LBS | HEART RATE: 84 BPM

## 2020-02-14 DIAGNOSIS — I51.89 DIASTOLIC DYSFUNCTION: ICD-10-CM

## 2020-02-14 DIAGNOSIS — I10 ESSENTIAL HYPERTENSION: ICD-10-CM

## 2020-02-14 DIAGNOSIS — M79.601 PAIN OF RIGHT UPPER EXTREMITY: Primary | ICD-10-CM

## 2020-02-14 DIAGNOSIS — E78.00 ELEVATED CHOLESTEROL: ICD-10-CM

## 2020-02-14 PROCEDURE — 1036F TOBACCO NON-USER: CPT | Performed by: INTERNAL MEDICINE

## 2020-02-14 PROCEDURE — 99212 OFFICE O/P EST SF 10 MIN: CPT | Performed by: INTERNAL MEDICINE

## 2020-02-14 PROCEDURE — 3074F SYST BP LT 130 MM HG: CPT | Performed by: INTERNAL MEDICINE

## 2020-02-14 PROCEDURE — 3078F DIAST BP <80 MM HG: CPT | Performed by: INTERNAL MEDICINE

## 2020-02-14 RX ORDER — ASPIRIN 325 MG
325 TABLET ORAL DAILY
COMMUNITY
End: 2020-03-10 | Stop reason: ALTCHOICE

## 2020-02-14 NOTE — PROGRESS NOTES
[address] [facility] 49 y o  female MRN: [id number]  Encounter: [id number]      Assessment/Plan:    # R forearm pain after LHC  -radial pulse is normal on exam; there is no evidence of hematoma, cellulitis, thrombophlebitis  -etiology unclear; possible inflammation  -recommend conservative management with rest, cold compresses, and ibuprofen 600mg TID for 3 days  # HTN  # Diastolic dysfunction  # HLD  # FHx of premature CAD; father 45s, brother had MI earlier in life          HPI: Otto Dickens 49y o  year old female with a history of HTN, diastolic dysfunction, HLD, diastolic dysfunction who presents for lump on her R arm after [facility] 2/4/20  She was evaluated by her cardiologist Dr Veronica Trevino on 1/30/20 for 1-2 months of chest pain reported as exertional heaviness on L chest radiating to L arm/neck  She underwent LHC for concern for unstable angina on 2/4/20  She had no epicardial coronary artery disease  Cath was performed via R radial access and no complications were reported  Since the procedure she states she has had 2-3/10 achy pain over the whole forearm since that has not significantly improved  She also thought she noticed a lump by the access site up until 1-2 days ago when it improved/resolved  Denies fevers/chills, diaphoresis, erythema, drainage from R radial access, finger pain/paraesthesias/discoloration  Lifting things makes the pain worse  She has tried ice intermittently, thinks it helped  Has not tried analgesics  Family History: non-contributory  Historical Information   Past Medical History:   Diagnosis Date    Fibroids     History of ovarian cyst     Hypertension     Migraine     Thyroid nodule      Past Surgical History:   Procedure Laterality Date    ENDOMETRIAL BIOPSY      W/o cervical dilation   Resolved 9/30/2014      TONSILLECTOMY      Age 6      TUBAL LIGATION       Social History   Social History     Substance and Sexual Activity   Alcohol Use Yes    Comment: occasional     Social History     Substance and Sexual Activity   Drug Use Never     Social History     Tobacco Use   Smoking Status Never Smoker   Smokeless Tobacco Never Used     Family History:   Family History   Problem Relation Age of Onset    Arthritis Mother     Diverticulitis Mother     Heart disease Mother     Hypertension Mother     Migraines Mother     Thyroid disease Mother     Arthritis Maternal Grandmother     Diabetes Maternal Grandmother     Heart disease Maternal Grandmother     Varicose Veins Maternal Grandmother     Heart attack Maternal Grandmother     Colon cancer Maternal Grandfather 58    Diabetes Paternal Grandmother     Heart disease Paternal Grandmother     Heart attack Paternal Grandmother     Heart disease Father     Heart disease Sister     Hypertension Sister     No Known Problems Daughter     Clotting disorder Paternal Grandfather     Heart disease Brother     Heart murmur Brother     No Known Problems Son     No Known Problems Son     Breast cancer Other        Review of Systems:  Review of Systems  Except as noted in the HPI and above, a comprehensive 14 point review of systems was negative  Current Medications: Reviewed    No Known Allergies    Objective   Vitals: not currently breastfeeding  , There is no height or weight on file to calculate BMI ,       Physical Exam:  Physical Exam   Constitutional: She is oriented to person, place, and time  She appears well-developed and well-nourished  She appears distressed (anxious)  Eyes: Pupils are equal, round, and reactive to light  Conjunctivae and EOM are normal    Neck: Normal range of motion  Neck supple  No JVD present  Cardiovascular: Normal rate, regular rhythm, normal heart sounds and intact distal pulses  Exam reveals no gallop and no friction rub  No murmur heard  Pulmonary/Chest: Effort normal  No respiratory distress  She has no wheezes  She has no rales     Abdominal: Soft  She exhibits no distension  There is no tenderness  Musculoskeletal: Normal range of motion  R radial access site CDI, no erythema/drainage, no fluid collection or hematoma palpated over forearm, cap refill <2, ROM wnl, radial pulse 2+   Neurological: She is alert and oriented to person, place, and time  Skin: Skin is warm and dry  Capillary refill takes less than 2 seconds  She is not diaphoretic  No pallor  Psychiatric: She has a normal mood and affect  Her behavior is normal        Lab Results: I have personally reviewed pertinent lab results  Imaging: I have personally reviewed pertinent reports  EKG: Personally reviewed    ECHO: reviewed  Previous Stress/Cath/PCI: reviewed      Rex Degroot MD  Cardiology Fellow

## 2020-03-04 ENCOUNTER — OFFICE VISIT (OUTPATIENT)
Dept: GASTROENTEROLOGY | Facility: AMBULARY SURGERY CENTER | Age: 55
End: 2020-03-04
Payer: COMMERCIAL

## 2020-03-04 VITALS
BODY MASS INDEX: 28.89 KG/M2 | SYSTOLIC BLOOD PRESSURE: 121 MMHG | RESPIRATION RATE: 18 BRPM | WEIGHT: 153 LBS | HEIGHT: 61 IN | HEART RATE: 62 BPM | TEMPERATURE: 98 F | DIASTOLIC BLOOD PRESSURE: 70 MMHG

## 2020-03-04 DIAGNOSIS — Z12.11 SCREENING FOR COLON CANCER: ICD-10-CM

## 2020-03-04 DIAGNOSIS — R10.12 LEFT UPPER QUADRANT PAIN: Primary | ICD-10-CM

## 2020-03-04 PROCEDURE — 99244 OFF/OP CNSLTJ NEW/EST MOD 40: CPT | Performed by: INTERNAL MEDICINE

## 2020-03-04 NOTE — PROGRESS NOTES
Consultation - [facility]  Rhett Rasmussen 1965 female         Chief Complaint:  Left upper quadrant pain    HPI:  60-year-old female with history of hypertension reports having left upper quadrant pain for about 6 months  She describes as constant pain and tenderness without any specific relationship with meal   Denies any nausea or vomiting  She takes Advil few times a week for headaches  Regular bowel movements but she goes once every 2-3 days  Denies any blood or mucus in the stool  She has occasional acid reflux symptoms  Denies any difficulty swallowing  She was having issues with chest tightness for which he was seen by cardiologist and had negative cardiac catheterization  She never had colonoscopy in the past       REVIEW OF SYSTEMS: Review of Systems   Constitutional: Negative for activity change, appetite change, chills, diaphoresis, fatigue, fever and unexpected weight change  HENT: Negative for ear discharge, ear pain, facial swelling, hearing loss, nosebleeds, sore throat, tinnitus and voice change  Eyes: Negative for pain, discharge, redness, itching and visual disturbance  Respiratory: Negative for apnea, cough, chest tightness, shortness of breath and wheezing  Cardiovascular: Negative for chest pain and palpitations  Gastrointestinal:        As noted in HPI   Endocrine: Negative for cold intolerance, heat intolerance and polyuria  Genitourinary: Negative for difficulty urinating, dysuria, flank pain, hematuria and urgency  Musculoskeletal: Negative for arthralgias, back pain, gait problem, joint swelling and myalgias  Skin: Negative for rash and wound  Neurological: Negative for dizziness, tremors, seizures, speech difficulty, light-headedness, numbness and headaches  Hematological: Negative for adenopathy  Does not bruise/bleed easily  Psychiatric/Behavioral: Negative for agitation, behavioral problems and confusion   The patient is not nervous/anxious  Past Medical History:   Diagnosis Date    Fibroids     History of ovarian cyst     Hypertension     Migraine     Thyroid nodule       Past Surgical History:   Procedure Laterality Date    ENDOMETRIAL BIOPSY      W/o cervical dilation   Resolved 9/30/2014      TONSILLECTOMY      Age 6      TUBAL LIGATION       Social History     Socioeconomic History    Marital status:      Spouse name: Not on file    Number of children: Not on file    Years of education: Not on file    Highest education level: Not on file   Occupational History    Not on file   Social Needs    Financial resource strain: Not on file    Food insecurity:     Worry: Not on file     Inability: Not on file    Transportation needs:     Medical: Not on file     Non-medical: Not on file   Tobacco Use    Smoking status: Never Smoker    Smokeless tobacco: Never Used   Substance and Sexual Activity    Alcohol use: Yes     Comment: occasional    Drug use: Never    Sexual activity: Yes     Partners: Male     Birth control/protection: Post-menopausal, Female Sterilization   Lifestyle    Physical activity:     Days per week: Not on file     Minutes per session: Not on file    Stress: Not on file   Relationships    Social connections:     Talks on phone: Not on file     Gets together: Not on file     Attends Denominational service: Not on file     Active member of club or organization: Not on file     Attends meetings of clubs or organizations: Not on file     Relationship status: Not on file    Intimate partner violence:     Fear of current or ex partner: Not on file     Emotionally abused: Not on file     Physically abused: Not on file     Forced sexual activity: Not on file   Other Topics Concern    Not on file   Social History Narrative    Marital history: Currently  - As per [employer]      Family History   Problem Relation Age of Onset    Arthritis Mother     Diverticulitis Mother  Heart disease Mother     Hypertension Mother    Jeral Hosemmanuel Migraines Mother     Thyroid disease Mother     Arthritis Maternal Grandmother     Diabetes Maternal Grandmother     Heart disease Maternal Grandmother     Varicose Veins Maternal Grandmother     Heart attack Maternal Grandmother     Colon cancer Maternal Grandfather 58    Diabetes Paternal Grandmother     Heart disease Paternal Grandmother     Heart attack Paternal Grandmother     Heart disease Father     Heart disease Sister     Hypertension Sister     No Known Problems Daughter     Clotting disorder Paternal Grandfather     Heart disease Brother     Heart murmur Brother     No Known Problems Son     No Known Problems Son     Breast cancer Other      Patient has no known allergies  Current Outpatient Medications   Medication Sig Dispense Refill    aspirin 325 mg tablet Take 325 mg by mouth daily      atorvastatin (LIPITOR) 40 mg tablet Take 1 tablet (40 mg total) by mouth daily with dinner 90 tablet 3    hydrochlorothiazide (HYDRODIURIL) 25 mg tablet Take 1 tablet (25 mg total) by mouth daily 90 tablet 3    metoprolol tartrate (LOPRESSOR) 25 mg tablet Take 1 tablet (25 mg total) by mouth every 12 (twelve) hours 180 tablet 3    bisacodyl (DULCOLAX) 5 mg EC tablet Take 2 tablets (10 mg total) by mouth once for 1 dose 2 tablet 0    polyethylene glycol (GOLYTELY) 4000 mL solution Take 4,000 mL by mouth once for 1 dose 4000 mL 0     No current facility-administered medications for this visit  Blood pressure 121/70, pulse 62, temperature 98 °F (36 7 °C), temperature source Tympanic, resp  rate 18, height 5' 1" (1 549 m), weight 69 4 kg (153 lb), not currently breastfeeding  PHYSICAL EXAM: Physical Exam   Constitutional: She is oriented to person, place, and time  She appears well-developed and well-nourished  HENT:   Head: Normocephalic and atraumatic     Nose: Nose normal    Mouth/Throat: Oropharynx is clear and moist    Eyes: Conjunctivae are normal  Right eye exhibits no discharge  Left eye exhibits no discharge  No scleral icterus  Neck: Neck supple  No JVD present  No tracheal deviation present  No thyromegaly present  Cardiovascular: Normal rate, regular rhythm and normal heart sounds  Exam reveals no gallop and no friction rub  No murmur heard  Pulmonary/Chest: Effort normal and breath sounds normal  No respiratory distress  She has no wheezes  She has no rales  She exhibits no tenderness  Abdominal: Soft  Bowel sounds are normal  She exhibits no distension and no mass  There is no tenderness  There is no rebound and no guarding  No hernia  Musculoskeletal: She exhibits no edema, tenderness or deformity  Lymphadenopathy:     She has no cervical adenopathy  Neurological: She is alert and oriented to person, place, and time  Skin: Skin is warm and dry  No rash noted  No erythema  Psychiatric: She has a normal mood and affect  Her behavior is normal  Thought content normal         Lab Results   Component Value Date    WBC 7 63 01/31/2020    HGB 12 9 01/31/2020    HCT 41 4 01/31/2020    MCV 94 01/31/2020     01/31/2020     Lab Results   Component Value Date    GLUCOSE 87 02/25/2014    CALCIUM 9 8 01/31/2020     02/25/2014    K 3 9 01/31/2020    CO2 28 01/31/2020     01/31/2020    BUN 16 01/31/2020    CREATININE 0 76 01/31/2020     Lab Results   Component Value Date    ALT 26 01/06/2020    AST 15 01/06/2020    ALKPHOS 100 01/06/2020    BILITOT 0 2 02/25/2014     Lab Results   Component Value Date    INR 0 97 01/31/2020    PROTIME 12 5 01/31/2020       No results found  ASSESSMENT & PLAN:    Left upper quadrant pain  Possible from peptic ulcer disease or gastric erosions especially with history of NSAID use    Possible musculoskeletal since she also has tenderness over the left lower ribcage    -schedule for upper endoscopy    -avoid NSAIDs    -Patient was explained about the lifestyle and dietary modifications  Advised to avoid fatty foods, chocolates, caffeine, alcohol and any other triggering foods  Avoid eating for at least 3 hours before going to bed  Screening for colon cancer  Screening for colon cancer - patient is at average risk for colon cancer screening  Rule out colorectal lesions including polyps or malignancy         -Schedule for colonoscopy  -High-fiber diet     -Patient was given instructions about the colonoscopy prep     -Patient was explained about  the risks and benefits of the procedure  Risks including but not limited to bleeding, infection, perforation were explained in detail  Also explained about less than 100% sensitivity with the exam and other alternatives

## 2020-03-04 NOTE — ASSESSMENT & PLAN NOTE
Possible from peptic ulcer disease or gastric erosions especially with history of NSAID use  Possible musculoskeletal since she also has tenderness over the left lower ribcage    -schedule for upper endoscopy    -avoid NSAIDs    -Patient was explained about the lifestyle and dietary modifications  Advised to avoid fatty foods, chocolates, caffeine, alcohol and any other triggering foods  Avoid eating for at least 3 hours before going to bed

## 2020-03-05 ENCOUNTER — ANESTHESIA EVENT (OUTPATIENT)
Dept: GASTROENTEROLOGY | Facility: AMBULARY SURGERY CENTER | Age: 55
End: 2020-03-05

## 2020-03-10 ENCOUNTER — OFFICE VISIT (OUTPATIENT)
Dept: CARDIOLOGY CLINIC | Facility: MEDICAL CENTER | Age: 55
End: 2020-03-10
Payer: COMMERCIAL

## 2020-03-10 VITALS
BODY MASS INDEX: 29.24 KG/M2 | SYSTOLIC BLOOD PRESSURE: 122 MMHG | HEART RATE: 88 BPM | OXYGEN SATURATION: 99 % | WEIGHT: 154.9 LBS | DIASTOLIC BLOOD PRESSURE: 72 MMHG | HEIGHT: 61 IN

## 2020-03-10 DIAGNOSIS — E78.00 ELEVATED CHOLESTEROL: ICD-10-CM

## 2020-03-10 DIAGNOSIS — I10 ESSENTIAL HYPERTENSION: Primary | ICD-10-CM

## 2020-03-10 PROCEDURE — 3078F DIAST BP <80 MM HG: CPT | Performed by: INTERNAL MEDICINE

## 2020-03-10 PROCEDURE — 3008F BODY MASS INDEX DOCD: CPT | Performed by: INTERNAL MEDICINE

## 2020-03-10 PROCEDURE — 3074F SYST BP LT 130 MM HG: CPT | Performed by: INTERNAL MEDICINE

## 2020-03-10 PROCEDURE — 99214 OFFICE O/P EST MOD 30 MIN: CPT | Performed by: INTERNAL MEDICINE

## 2020-03-10 PROCEDURE — 1036F TOBACCO NON-USER: CPT | Performed by: INTERNAL MEDICINE

## 2020-03-10 RX ORDER — ASPIRIN 81 MG/1
81 TABLET, CHEWABLE ORAL DAILY
Qty: 90 TABLET | Refills: 3 | Status: SHIPPED | OUTPATIENT
Start: 2020-03-10 | End: 2021-03-18

## 2020-03-10 NOTE — PROGRESS NOTES
Cardiology Follow Up    Duglas Garcia  1965  [id number]  [facility] [place]  [address] [facility] [address]  [phone]    1  Essential hypertension  aspirin 81 mg chewable tablet   2  Elevated cholesterol  aspirin 81 mg chewable tablet       Diagnoses and all orders for this visit:    Essential hypertension  -     aspirin 81 mg chewable tablet; Chew 1 tablet (81 mg total) daily    Elevated cholesterol  -     aspirin 81 mg chewable tablet; Chew 1 tablet (81 mg total) daily      I had the pleasure of seeing Duglas Garcia for a follow up visit  INTERVAL HISTORY: chest pains    History of the presenting illness, Discussion/Summary and My Plan are as follows:::    She is a very pleasant 71-year-old lady with a history of hypertension and dyslipidemia-both untreated till 2019, no diabetes, strong family history of premature vascular disease, no personal history of tobacco use, some passive exposure to smoke, who had not sought any medical attention for some time and then got medical insurance and sought attention for chest pains, evaluated by Dr Leal, found to be hypertensive and sent to the ER where blood pressure was elevated even further to 240/114 in May 2019  At that point evaluation for chest pains was negative, she had a CT scan an echocardiogram and subsequently a pharmacologic stress test all of which were essentially unremarkable  At her last office visit, complained of chest pain suggestive of angina, underwent coronary angiography that was negative for any obstructive disease      Fatigue remains, works long hours, has a stressful job and personal life, having custody of her daughter's son      On a statin, her lipids have significantly improved from 192-101       Plan:     Chest pain:  At the last visit, suggestive of unstable angina and underwent coronary angiography without any obstructive disease  Developed some radial artery site pain, with normal pulses that improved with conservative management  She has a lot of family stressors that are unlikely to change any time soon  I told her that she needs to seek help or find a way to address them for her own mental hygiene  Fatigue:  A sleep apnea could be performed for sleep apnea but also has a lot of stress in life and sleep duration itself is limited due to her work schedule      Hypertension:  currently well controlled on hydrochlorothiazide and metoprolol, no changes at this time     Dyslipidemia:  Now on a statin, lipids have improved, await coronary angiography results      follow-up in 6 months      Results for Monique Lawson (MRN [id number]) as of 1/30/2020 16:23   Ref   Range 5/3/2019 05:05 10/24/2019 09:02 1/6/2020 15:31   Cholesterol Latest Ref Range: 50 - 200 mg/dL 280 (H) 196 225 (H)   Triglycerides Latest Ref Range: <=150 mg/dL 61 123 312 (H)   HDL Latest Ref Range: >=40 mg/dL 76 (H) 69 62   LDL Direct Latest Ref Range: 0 - 100 mg/dL 192 (H) 102 (H) 101 (H)       Patient Active Problem List   Diagnosis    Diastolic dysfunction    Elevated cholesterol    Second hand smoke exposure    Lung nodule    Hypertension    Heel spur, left    Unstable angina (HCC)    Hyperlipidemia    Pain of right upper extremity    Left upper quadrant pain    Screening for colon cancer     Past Medical History:   Diagnosis Date    Fibroids     History of ovarian cyst     Hypertension     Migraine     Thyroid nodule      Social History     Socioeconomic History    Marital status:      Spouse name: Not on file    Number of children: Not on file    Years of education: Not on file    Highest education level: Not on file   Occupational History    Not on file   Social Needs    Financial resource strain: Not on file    Food insecurity:     Worry: Not on file     Inability: Not on file    Transportation needs: Medical: Not on file     Non-medical: Not on file   Tobacco Use    Smoking status: Never Smoker    Smokeless tobacco: Never Used   Substance and Sexual Activity    Alcohol use: Yes     Comment: occasional    Drug use: Never    Sexual activity: Yes     Partners: Male     Birth control/protection: Post-menopausal, Female Sterilization   Lifestyle    Physical activity:     Days per week: Not on file     Minutes per session: Not on file    Stress: Not on file   Relationships    Social connections:     Talks on phone: Not on file     Gets together: Not on file     Attends Rastafarian service: Not on file     Active member of club or organization: Not on file     Attends meetings of clubs or organizations: Not on file     Relationship status: Not on file    Intimate partner violence:     Fear of current or ex partner: Not on file     Emotionally abused: Not on file     Physically abused: Not on file     Forced sexual activity: Not on file   Other Topics Concern    Not on file   Social History Narrative    Marital history: Currently  - As per [employer]       Family History   Problem Relation Age of Onset    Arthritis Mother     Diverticulitis Mother     Heart disease Mother     Hypertension Mother     Migraines Mother     Thyroid disease Mother     Arthritis Maternal Grandmother     Diabetes Maternal Grandmother     Heart disease Maternal Grandmother     Varicose Veins Maternal Grandmother     Heart attack Maternal Grandmother     Colon cancer Maternal Grandfather 58    Diabetes Paternal Grandmother     Heart disease Paternal Grandmother     Heart attack Paternal Grandmother     Heart disease Father     Heart disease Sister     Hypertension Sister     No Known Problems Daughter     Clotting disorder Paternal Grandfather     Heart disease Brother     Heart murmur Brother     No Known Problems Son     No Known Problems Son     Breast cancer Other      Past Surgical History: Procedure Laterality Date    ENDOMETRIAL BIOPSY      W/o cervical dilation  Resolved 9/30/2014      TONSILLECTOMY      Age 6      TUBAL LIGATION         Current Outpatient Medications:     atorvastatin (LIPITOR) 40 mg tablet, Take 1 tablet (40 mg total) by mouth daily with dinner, Disp: 90 tablet, Rfl: 3    bisacodyl (DULCOLAX) 5 mg EC tablet, Take 2 tablets (10 mg total) by mouth once for 1 dose, Disp: 2 tablet, Rfl: 0    hydrochlorothiazide (HYDRODIURIL) 25 mg tablet, Take 1 tablet (25 mg total) by mouth daily, Disp: 90 tablet, Rfl: 3    metoprolol tartrate (LOPRESSOR) 25 mg tablet, Take 1 tablet (25 mg total) by mouth every 12 (twelve) hours, Disp: 180 tablet, Rfl: 3    polyethylene glycol (GOLYTELY) 4000 mL solution, Take 4,000 mL by mouth once for 1 dose, Disp: 4000 mL, Rfl: 0    aspirin 81 mg chewable tablet, Chew 1 tablet (81 mg total) daily, Disp: 90 tablet, Rfl: 3  No Known Allergies    Imaging: No results found  Review of Systems:  Review of Systems   Constitutional: Negative  HENT: Negative  Eyes: Negative  Respiratory: Negative  Cardiovascular: Negative  Endocrine: Negative  Musculoskeletal: Negative  Neurological: Negative  Physical Exam:  /72 (BP Location: Left arm, Patient Position: Sitting, Cuff Size: Adult)   Pulse 88   Ht 5' 1" (1 549 m)   Wt 70 3 kg (154 lb 14 4 oz)   SpO2 99%   BMI 29 27 kg/m²   Physical Exam   Constitutional: She appears well-developed and well-nourished  Non-toxic appearance  She does not appear ill  HENT:   Head: Normocephalic  Mouth/Throat: Oropharynx is clear and moist  No oropharyngeal exudate or posterior oropharyngeal edema  Eyes: Pupils are equal, round, and reactive to light  Neck: Normal range of motion  No hepatojugular reflux and no JVD present  No tracheal deviation present  No thyromegaly present  Cardiovascular: Normal rate, regular rhythm and intact distal pulses     Pulmonary/Chest: Effort normal and breath sounds normal  No accessory muscle usage  No tachypnea and no bradypnea  No respiratory distress  She has no decreased breath sounds  She has no wheezes  She has no rales  She exhibits no mass, no tenderness, no laceration and no crepitus  Abdominal: Soft  She exhibits no distension and no mass  There is no tenderness  There is no guarding  Musculoskeletal:        Right lower leg: Normal  She exhibits no edema  Left lower leg: She exhibits no edema  Skin: Skin is warm  No rash noted  She is not diaphoretic  No erythema  No pallor

## 2020-03-18 ENCOUNTER — TELEPHONE (OUTPATIENT)
Dept: GASTROENTEROLOGY | Facility: AMBULARY SURGERY CENTER | Age: 55
End: 2020-03-18

## 2020-03-19 ENCOUNTER — ANESTHESIA (OUTPATIENT)
Dept: GASTROENTEROLOGY | Facility: AMBULARY SURGERY CENTER | Age: 55
End: 2020-03-19

## 2020-04-21 DIAGNOSIS — I16.0 HYPERTENSIVE URGENCY: ICD-10-CM

## 2020-04-22 DIAGNOSIS — R07.9 CHEST PAIN, UNSPECIFIED TYPE: ICD-10-CM

## 2020-04-22 RX ORDER — ATORVASTATIN CALCIUM 40 MG/1
40 TABLET, FILM COATED ORAL
Qty: 90 TABLET | Refills: 3 | Status: SHIPPED | OUTPATIENT
Start: 2020-04-22 | End: 2021-05-29 | Stop reason: SDUPTHER

## 2020-04-22 RX ORDER — HYDROCHLOROTHIAZIDE 25 MG/1
TABLET ORAL
Qty: 90 TABLET | Refills: 3 | Status: SHIPPED | OUTPATIENT
Start: 2020-04-22 | End: 2021-05-26

## 2020-04-26 ENCOUNTER — OFFICE VISIT (OUTPATIENT)
Dept: URGENT CARE | Facility: MEDICAL CENTER | Age: 55
End: 2020-04-26
Payer: COMMERCIAL

## 2020-04-26 VITALS
DIASTOLIC BLOOD PRESSURE: 84 MMHG | RESPIRATION RATE: 18 BRPM | SYSTOLIC BLOOD PRESSURE: 146 MMHG | BODY MASS INDEX: 30.63 KG/M2 | HEART RATE: 74 BPM | WEIGHT: 156 LBS | HEIGHT: 60 IN | TEMPERATURE: 97.6 F | OXYGEN SATURATION: 96 %

## 2020-04-26 DIAGNOSIS — T16.1XXA FOREIGN BODY OF RIGHT EAR, INITIAL ENCOUNTER: Primary | ICD-10-CM

## 2020-04-26 PROCEDURE — 99283 EMERGENCY DEPT VISIT LOW MDM: CPT | Performed by: PHYSICIAN ASSISTANT

## 2020-04-26 PROCEDURE — 99213 OFFICE O/P EST LOW 20 MIN: CPT | Performed by: PHYSICIAN ASSISTANT

## 2020-04-26 PROCEDURE — 69200 CLEAR OUTER EAR CANAL: CPT | Performed by: PHYSICIAN ASSISTANT

## 2020-04-26 PROCEDURE — G0382 LEV 3 HOSP TYPE B ED VISIT: HCPCS | Performed by: PHYSICIAN ASSISTANT

## 2020-04-26 RX ORDER — BACITRACIN ZINC AND POLYMYXIN B SULFATE 500; 1000 [USP'U]/G; [USP'U]/G
OINTMENT TOPICAL 2 TIMES DAILY
Qty: 15 G | Refills: 0 | Status: SHIPPED | OUTPATIENT
Start: 2020-04-26 | End: 2020-07-06 | Stop reason: ALTCHOICE

## 2020-04-26 RX ORDER — OFLOXACIN 3 MG/ML
10 SOLUTION AURICULAR (OTIC) 2 TIMES DAILY
Qty: 5 ML | Refills: 0 | Status: SHIPPED | OUTPATIENT
Start: 2020-04-26 | End: 2020-04-26 | Stop reason: CLARIF

## 2020-05-14 ENCOUNTER — TELEPHONE (OUTPATIENT)
Dept: GASTROENTEROLOGY | Facility: AMBULARY SURGERY CENTER | Age: 55
End: 2020-05-14

## 2020-05-14 DIAGNOSIS — Z12.11 SCREENING FOR COLON CANCER: ICD-10-CM

## 2020-05-27 DIAGNOSIS — Z11.59 SCREENING FOR VIRAL DISEASE: ICD-10-CM

## 2020-05-27 PROCEDURE — U0003 INFECTIOUS AGENT DETECTION BY NUCLEIC ACID (DNA OR RNA); SEVERE ACUTE RESPIRATORY SYNDROME CORONAVIRUS 2 (SARS-COV-2) (CORONAVIRUS DISEASE [COVID-19]), AMPLIFIED PROBE TECHNIQUE, MAKING USE OF HIGH THROUGHPUT TECHNOLOGIES AS DESCRIBED BY CMS-2020-01-R: HCPCS | Performed by: OBSTETRICS & GYNECOLOGY

## 2020-05-28 ENCOUNTER — TELEPHONE (OUTPATIENT)
Dept: GASTROENTEROLOGY | Facility: AMBULARY SURGERY CENTER | Age: 55
End: 2020-05-28

## 2020-05-28 LAB — SARS-COV-2 RNA SPEC QL NAA+PROBE: NOT DETECTED

## 2020-06-04 ENCOUNTER — HOSPITAL ENCOUNTER (OUTPATIENT)
Dept: GASTROENTEROLOGY | Facility: AMBULARY SURGERY CENTER | Age: 55
Setting detail: OUTPATIENT SURGERY
Discharge: HOME | End: 2020-06-04
Attending: INTERNAL MEDICINE | Admitting: INTERNAL MEDICINE
Payer: COMMERCIAL

## 2020-06-04 VITALS
BODY MASS INDEX: 27.11 KG/M2 | SYSTOLIC BLOOD PRESSURE: 170 MMHG | HEIGHT: 63 IN | HEART RATE: 70 BPM | TEMPERATURE: 97.5 F | DIASTOLIC BLOOD PRESSURE: 81 MMHG | WEIGHT: 153 LBS | OXYGEN SATURATION: 98 % | RESPIRATION RATE: 20 BRPM

## 2020-06-04 DIAGNOSIS — Z12.11 SCREENING FOR COLON CANCER: ICD-10-CM

## 2020-06-04 DIAGNOSIS — R10.12 LEFT UPPER QUADRANT PAIN: ICD-10-CM

## 2020-06-04 PROCEDURE — 88305 TISSUE EXAM BY PATHOLOGIST: CPT | Performed by: PATHOLOGY

## 2020-06-04 PROCEDURE — 45378 DIAGNOSTIC COLONOSCOPY: CPT | Performed by: INTERNAL MEDICINE

## 2020-06-04 PROCEDURE — NC001 PR NO CHARGE: Performed by: INTERNAL MEDICINE

## 2020-06-04 PROCEDURE — 43239 EGD BIOPSY SINGLE/MULTIPLE: CPT | Performed by: INTERNAL MEDICINE

## 2020-06-04 RX ORDER — PROPOFOL 10 MG/ML
INJECTION, EMULSION INTRAVENOUS AS NEEDED
Status: DISCONTINUED | OUTPATIENT
Start: 2020-06-04 | End: 2020-06-04 | Stop reason: SURG

## 2020-06-04 RX ORDER — SODIUM CHLORIDE, SODIUM LACTATE, POTASSIUM CHLORIDE, CALCIUM CHLORIDE 600; 310; 30; 20 MG/100ML; MG/100ML; MG/100ML; MG/100ML
INJECTION, SOLUTION INTRAVENOUS CONTINUOUS PRN
Status: DISCONTINUED | OUTPATIENT
Start: 2020-06-04 | End: 2020-06-04 | Stop reason: SURG

## 2020-06-04 RX ORDER — LIDOCAINE HYDROCHLORIDE 10 MG/ML
INJECTION, SOLUTION EPIDURAL; INFILTRATION; INTRACAUDAL; PERINEURAL AS NEEDED
Status: DISCONTINUED | OUTPATIENT
Start: 2020-06-04 | End: 2020-06-04 | Stop reason: SURG

## 2020-06-04 RX ORDER — PANTOPRAZOLE SODIUM 40 MG/1
40 TABLET, DELAYED RELEASE ORAL DAILY
Qty: 30 TABLET | Refills: 3 | Status: SHIPPED | OUTPATIENT
Start: 2020-06-04 | End: 2020-09-08

## 2020-06-04 RX ADMIN — PROPOFOL 60 MG: 10 INJECTION, EMULSION INTRAVENOUS at 10:03

## 2020-06-04 RX ADMIN — PROPOFOL 50 MG: 10 INJECTION, EMULSION INTRAVENOUS at 10:15

## 2020-06-04 RX ADMIN — LIDOCAINE HYDROCHLORIDE 50 MG: 10 INJECTION, SOLUTION EPIDURAL; INFILTRATION; INTRACAUDAL; PERINEURAL at 10:00

## 2020-06-04 RX ADMIN — PROPOFOL 120 MG: 10 INJECTION, EMULSION INTRAVENOUS at 10:00

## 2020-06-04 RX ADMIN — PROPOFOL 30 MG: 10 INJECTION, EMULSION INTRAVENOUS at 10:17

## 2020-06-04 RX ADMIN — PROPOFOL 60 MG: 10 INJECTION, EMULSION INTRAVENOUS at 10:11

## 2020-06-04 RX ADMIN — SODIUM CHLORIDE, SODIUM LACTATE, POTASSIUM CHLORIDE, AND CALCIUM CHLORIDE: .6; .31; .03; .02 INJECTION, SOLUTION INTRAVENOUS at 09:37

## 2020-06-04 RX ADMIN — PROPOFOL 40 MG: 10 INJECTION, EMULSION INTRAVENOUS at 10:13

## 2020-06-07 DIAGNOSIS — I16.0 HYPERTENSIVE URGENCY: ICD-10-CM

## 2020-06-11 ENCOUNTER — TELEPHONE (OUTPATIENT)
Dept: GASTROENTEROLOGY | Facility: AMBULARY SURGERY CENTER | Age: 55
End: 2020-06-11

## 2020-06-11 NOTE — TELEPHONE ENCOUNTER
----- Message from Aurelia Cha MD sent at 6/5/2020 12:04 PM EDT -----  Gastric biopsies are benign and negative for H pylori  Patient to take Protonix for 3 months

## 2020-07-02 ENCOUNTER — TELEPHONE (OUTPATIENT)
Dept: CARDIOLOGY CLINIC | Facility: CLINIC | Age: 55
End: 2020-07-02

## 2020-07-02 NOTE — TELEPHONE ENCOUNTER
[facility] called the nurse line this AM  She said that since yesterday, she is feeling tightness in her chest, tightness in her throat, sob,  is nauseated and feeling dizzy  Does not have BP cuff at home  I advised her to go to the ER for eval given symptoms  She agreed to ER      BETTIE

## 2020-07-03 ENCOUNTER — APPOINTMENT (EMERGENCY)
Dept: RADIOLOGY | Facility: HOSPITAL | Age: 55
End: 2020-07-03
Payer: COMMERCIAL

## 2020-07-03 ENCOUNTER — HOSPITAL ENCOUNTER (OUTPATIENT)
Facility: HOSPITAL | Age: 55
Setting detail: OBSERVATION
Discharge: HOME | End: 2020-07-04
Attending: EMERGENCY MEDICINE | Admitting: INTERNAL MEDICINE
Payer: COMMERCIAL

## 2020-07-03 DIAGNOSIS — R07.9 CHEST PAIN, UNSPECIFIED TYPE: Primary | ICD-10-CM

## 2020-07-03 PROBLEM — K25.9 GASTRIC EROSIONS: Status: ACTIVE | Noted: 2020-07-03

## 2020-07-03 PROBLEM — R06.02 SOB (SHORTNESS OF BREATH): Status: ACTIVE | Noted: 2020-07-03

## 2020-07-03 PROBLEM — R42 DIZZINESS: Status: ACTIVE | Noted: 2020-07-03

## 2020-07-03 LAB
ALBUMIN SERPL BCP-MCNC: 4 G/DL (ref 3.5–5)
ALP SERPL-CCNC: 113 U/L (ref 46–116)
ALT SERPL W P-5'-P-CCNC: 26 U/L (ref 12–78)
ANION GAP SERPL CALCULATED.3IONS-SCNC: 7 MMOL/L (ref 4–13)
APTT PPP: 30 SECONDS (ref 23–37)
AST SERPL W P-5'-P-CCNC: 19 U/L (ref 5–45)
ATRIAL RATE: 70 BPM
BASOPHILS # BLD AUTO: 0.06 THOUSANDS/ΜL (ref 0–0.1)
BASOPHILS NFR BLD AUTO: 1 % (ref 0–1)
BILIRUB SERPL-MCNC: 0.28 MG/DL (ref 0.2–1)
BUN SERPL-MCNC: 12 MG/DL (ref 5–25)
CALCIUM SERPL-MCNC: 9.6 MG/DL (ref 8.3–10.1)
CHLORIDE SERPL-SCNC: 102 MMOL/L (ref 100–108)
CO2 SERPL-SCNC: 28 MMOL/L (ref 21–32)
CREAT SERPL-MCNC: 0.81 MG/DL (ref 0.6–1.3)
EOSINOPHIL # BLD AUTO: 0.17 THOUSAND/ΜL (ref 0–0.61)
EOSINOPHIL NFR BLD AUTO: 2 % (ref 0–6)
ERYTHROCYTE [DISTWIDTH] IN BLOOD BY AUTOMATED COUNT: 13.2 % (ref 11.6–15.1)
GFR SERPL CREATININE-BSD FRML MDRD: 83 ML/MIN/1.73SQ M
GLUCOSE SERPL-MCNC: 92 MG/DL (ref 65–140)
HCT VFR BLD AUTO: 42.2 % (ref 34.8–46.1)
HGB BLD-MCNC: 13.5 G/DL (ref 11.5–15.4)
IMM GRANULOCYTES # BLD AUTO: 0.02 THOUSAND/UL (ref 0–0.2)
IMM GRANULOCYTES NFR BLD AUTO: 0 % (ref 0–2)
INR PPP: 0.93 (ref 0.84–1.19)
LYMPHOCYTES # BLD AUTO: 2.55 THOUSANDS/ΜL (ref 0.6–4.47)
LYMPHOCYTES NFR BLD AUTO: 35 % (ref 14–44)
MCH RBC QN AUTO: 29.8 PG (ref 26.8–34.3)
MCHC RBC AUTO-ENTMCNC: 32 G/DL (ref 31.4–37.4)
MCV RBC AUTO: 93 FL (ref 82–98)
MONOCYTES # BLD AUTO: 0.47 THOUSAND/ΜL (ref 0.17–1.22)
MONOCYTES NFR BLD AUTO: 6 % (ref 4–12)
NEUTROPHILS # BLD AUTO: 4.06 THOUSANDS/ΜL (ref 1.85–7.62)
NEUTS SEG NFR BLD AUTO: 56 % (ref 43–75)
NRBC BLD AUTO-RTO: 0 /100 WBCS
NT-PROBNP SERPL-MCNC: 64 PG/ML
P AXIS: 28 DEGREES
PLATELET # BLD AUTO: 266 THOUSANDS/UL (ref 149–390)
PMV BLD AUTO: 10.5 FL (ref 8.9–12.7)
POTASSIUM SERPL-SCNC: 3.4 MMOL/L (ref 3.5–5.3)
PR INTERVAL: 152 MS
PROT SERPL-MCNC: 7.5 G/DL (ref 6.4–8.2)
PROTHROMBIN TIME: 11.9 SECONDS (ref 11.6–14.5)
QRS AXIS: 6 DEGREES
QRSD INTERVAL: 74 MS
QT INTERVAL: 398 MS
QTC INTERVAL: 429 MS
RBC # BLD AUTO: 4.53 MILLION/UL (ref 3.81–5.12)
SODIUM SERPL-SCNC: 137 MMOL/L (ref 136–145)
T WAVE AXIS: 53 DEGREES
TROPONIN I SERPL-MCNC: <0.02 NG/ML
VENTRICULAR RATE: 70 BPM
WBC # BLD AUTO: 7.33 THOUSAND/UL (ref 4.31–10.16)

## 2020-07-03 PROCEDURE — 80053 COMPREHEN METABOLIC PANEL: CPT

## 2020-07-03 PROCEDURE — 93005 ELECTROCARDIOGRAM TRACING: CPT

## 2020-07-03 PROCEDURE — 84484 ASSAY OF TROPONIN QUANT: CPT

## 2020-07-03 PROCEDURE — 85730 THROMBOPLASTIN TIME PARTIAL: CPT

## 2020-07-03 PROCEDURE — 85025 COMPLETE CBC W/AUTO DIFF WBC: CPT

## 2020-07-03 PROCEDURE — 84484 ASSAY OF TROPONIN QUANT: CPT | Performed by: PHYSICIAN ASSISTANT

## 2020-07-03 PROCEDURE — 99220 PR INITIAL OBSERVATION CARE/DAY 70 MINUTES: CPT | Performed by: INTERNAL MEDICINE

## 2020-07-03 PROCEDURE — 93010 ELECTROCARDIOGRAM REPORT: CPT | Performed by: INTERNAL MEDICINE

## 2020-07-03 PROCEDURE — 83880 ASSAY OF NATRIURETIC PEPTIDE: CPT | Performed by: PHYSICIAN ASSISTANT

## 2020-07-03 PROCEDURE — 85610 PROTHROMBIN TIME: CPT

## 2020-07-03 PROCEDURE — 99285 EMERGENCY DEPT VISIT HI MDM: CPT | Performed by: PHYSICIAN ASSISTANT

## 2020-07-03 PROCEDURE — 99285 EMERGENCY DEPT VISIT HI MDM: CPT

## 2020-07-03 PROCEDURE — 36415 COLL VENOUS BLD VENIPUNCTURE: CPT

## 2020-07-03 PROCEDURE — 71045 X-RAY EXAM CHEST 1 VIEW: CPT

## 2020-07-03 RX ORDER — PANTOPRAZOLE SODIUM 40 MG/1
40 TABLET, DELAYED RELEASE ORAL DAILY
Status: DISCONTINUED | OUTPATIENT
Start: 2020-07-04 | End: 2020-07-04 | Stop reason: HOSPADM

## 2020-07-03 RX ORDER — HYDROCHLOROTHIAZIDE 25 MG/1
25 TABLET ORAL DAILY
Status: DISCONTINUED | OUTPATIENT
Start: 2020-07-04 | End: 2020-07-04 | Stop reason: HOSPADM

## 2020-07-03 RX ORDER — ACETAMINOPHEN 325 MG/1
325 TABLET ORAL EVERY 6 HOURS PRN
Status: DISCONTINUED | OUTPATIENT
Start: 2020-07-03 | End: 2020-07-03

## 2020-07-03 RX ORDER — ONDANSETRON 2 MG/ML
4 INJECTION INTRAMUSCULAR; INTRAVENOUS EVERY 6 HOURS PRN
Status: DISCONTINUED | OUTPATIENT
Start: 2020-07-03 | End: 2020-07-04 | Stop reason: HOSPADM

## 2020-07-03 RX ORDER — POTASSIUM CHLORIDE 20 MEQ/1
20 TABLET, EXTENDED RELEASE ORAL ONCE
Status: COMPLETED | OUTPATIENT
Start: 2020-07-03 | End: 2020-07-03

## 2020-07-03 RX ORDER — ACETAMINOPHEN 325 MG/1
650 TABLET ORAL EVERY 6 HOURS PRN
Status: DISCONTINUED | OUTPATIENT
Start: 2020-07-03 | End: 2020-07-04 | Stop reason: HOSPADM

## 2020-07-03 RX ORDER — ASPIRIN 81 MG/1
81 TABLET, CHEWABLE ORAL DAILY
Status: DISCONTINUED | OUTPATIENT
Start: 2020-07-04 | End: 2020-07-04 | Stop reason: HOSPADM

## 2020-07-03 RX ORDER — MAGNESIUM HYDROXIDE/ALUMINUM HYDROXICE/SIMETHICONE 120; 1200; 1200 MG/30ML; MG/30ML; MG/30ML
30 SUSPENSION ORAL EVERY 6 HOURS PRN
Status: DISCONTINUED | OUTPATIENT
Start: 2020-07-03 | End: 2020-07-04 | Stop reason: HOSPADM

## 2020-07-03 RX ORDER — NITROGLYCERIN 0.4 MG/1
0.4 TABLET SUBLINGUAL ONCE
Status: COMPLETED | OUTPATIENT
Start: 2020-07-03 | End: 2020-07-03

## 2020-07-03 RX ORDER — ATORVASTATIN CALCIUM 40 MG/1
40 TABLET, FILM COATED ORAL
Status: DISCONTINUED | OUTPATIENT
Start: 2020-07-03 | End: 2020-07-04 | Stop reason: HOSPADM

## 2020-07-03 RX ORDER — ASPIRIN 81 MG/1
324 TABLET, CHEWABLE ORAL ONCE
Status: COMPLETED | OUTPATIENT
Start: 2020-07-03 | End: 2020-07-03

## 2020-07-03 RX ADMIN — ASPIRIN 81 MG 324 MG: 81 TABLET ORAL at 14:32

## 2020-07-03 RX ADMIN — POTASSIUM CHLORIDE 20 MEQ: 1500 TABLET, EXTENDED RELEASE ORAL at 16:58

## 2020-07-03 RX ADMIN — ATORVASTATIN CALCIUM 40 MG: 40 TABLET, FILM COATED ORAL at 16:58

## 2020-07-03 RX ADMIN — ACETAMINOPHEN 650 MG: 325 TABLET, FILM COATED ORAL at 18:03

## 2020-07-03 RX ADMIN — METOPROLOL TARTRATE 25 MG: 25 TABLET, FILM COATED ORAL at 21:00

## 2020-07-03 RX ADMIN — NITROGLYCERIN 0.4 MG: 0.4 TABLET SUBLINGUAL at 14:32

## 2020-07-03 NOTE — ASSESSMENT & PLAN NOTE
· Suspect that this is related to anxiety and prolonged days in heat wearing mask at work  · Lung exam is unremarkable  · Chest x-ray negative on personal review  · No evidence of hypoxia  · Do not believe this is cardiac related     · Not exertional

## 2020-07-03 NOTE — ASSESSMENT & PLAN NOTE
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

## 2020-07-03 NOTE — PLAN OF CARE
Problem: CARDIOVASCULAR - ADULT  Goal: Maintains optimal cardiac output and hemodynamic stability  Description  INTERVENTIONS:  - Monitor I/O, vital signs and rhythm  - Monitor for S/S and trends of decreased cardiac output  - Administer and titrate ordered vasoactive medications to optimize hemodynamic stability  - Assess quality of pulses, skin color and temperature  - Assess for signs of decreased coronary artery perfusion  - Instruct patient to report change in severity of symptoms  Outcome: Progressing  Goal: Absence of cardiac dysrhythmias or at baseline rhythm  Description  INTERVENTIONS:  - Continuous cardiac monitoring, vital signs, obtain 12 lead EKG if ordered  - Administer antiarrhythmic and heart rate control medications as ordered  - Monitor electrolytes and administer replacement therapy as ordered  Outcome: Progressing     Problem: PAIN - ADULT  Goal: Verbalizes/displays adequate comfort level or baseline comfort level  Description  Interventions:  - Encourage patient to monitor pain and request assistance  - Assess pain using appropriate pain scale  - Administer analgesics based on type and severity of pain and evaluate response  - Implement non-pharmacological measures as appropriate and evaluate response  - Consider cultural and social influences on pain and pain management  - Notify physician/advanced practitioner if interventions unsuccessful or patient reports new pain  Outcome: Progressing

## 2020-07-03 NOTE — LETTER
[facility]    [address] [address]  Dept: [phone]    July 4, 2020     Patient: Karri Phillips   YOB: 1965   Date of Visit: 7/3/2020       To Whom it May Concern:    Karri Phillips is under my professional care  She was seen in the hospital from 7/3/2020   to 07/04/20  She may return to work on 7/7/20 without limitations  If you have any questions or concerns, please don't hesitate to call           Sincerely,          Bill Reis PA-C

## 2020-07-03 NOTE — H&P
[facility]  H&P- Beverly Brooksn 1965, 49 y o  female MRN: [id number]  Unit/Bed#: ED 19 Encounter: [id number]  Primary Care Provider: Maxwell Gasca DO   Date and time admitted to hospital: 7/3/2020 12:14 PM    * Chest pain  Assessment & Plan  · Coronary angiography performed in February 2020 for symptoms consistent with unstable angina showed no evidence of occlusive coronary artery disease  All vessels appeared normal with no evidence of disease  It was felt that this was likely related to stressors at home  · Troponin on admission negative  · EKG on admission:  Normal sinus rhythm 70 beats per minute with no acute ST/T-wave changes  · Trend troponins x3  · Monitor on telemetry  · Repeat EKG with serial troponins  · Check lipid panel and A1c  · Continue Lipitor and aspirin  · EDWARD score equals 2 secondary to use of aspirin and risk factors  · Suspect this is related to significant anxiety from life stressors including her son abusing heroin and being arrested this week vs muscular in etiology given reproducible on exam   · No indication for ST or further ischemic workup at this time  Dizziness  Assessment & Plan  · Patient reports significant dizziness for the past 3 days with visual changes and lip tingling  · She has been working for 11 hours a day in a facility that this not have air conditioning  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    · Suspect this is likely related to heat exhaustion from prolonged exposure in the heat while wearing a mask  · Obtain orthostatic vital sign  · Rule out cardiac etiology with telemetry and troponins  · No focal neurologic deficits on exam and no indication for CT of the head imaging at this time    SOB (shortness of breath)  Assessment & Plan  · Suspect that this is related to anxiety and prolonged days in heat wearing mask at work  · Lung exam is unremarkable  · Chest x-ray negative on personal review  · No evidence of hypoxia  · Do not believe this is cardiac related  · Not exertional    Gastric erosions  Assessment & Plan  · Status post EGD and colonoscopy on 6/4/2020 which was noted to show antral erosions  Was recommended to undergo Protonix treatment for 3 months  · Biopsies unremarkable  Negative for H pylori  · Continue protonix daily    Hypertension  Assessment & Plan  · Continue hydrochlorothiazide and metoprolol  · Blood pressure currently 145/72    Hyperlipidemia  Assessment & Plan  · Continue Lipitor 40 mg daily  · Check lipid panel        VTE Prophylaxis: Enoxaparin (Lovenox)    Code Status: FULL CODE  POLST: There is no POLST form on file for this patient (pre-hospital)  Discussion with family: patient    Anticipated Length of Stay:  Patient will be admitted on an Observation basis with an anticipated length of stay of  < 2 midnights  Justification for Hospital Stay: chest pain rule out     Total Time for Visit, including Counseling / Coordination of Care: 45 minutes  Greater than 50% of this total time spent on direct patient counseling and coordination of care  Chief Complaint:   Chest pain    History of Present Illness:    Nakia Denton is a 49 y o  female who presents with chest pain  She has past medical history of hypertension  and hyperlipidemia for untreated until 2019  She has a strong family history of premature vascular disease  In May of 2019 she was noted to be hypertensive and had chest pain and some was sent to the emergency department at which point she was noted to have CT scan, echocardiogram and a pharmacologic stress test which were unremarkable  The patient was noted to have had chest pain suggestive of angina previously in February at which point she underwent coronary angiography and was negative for any obstructive disease  It was felt that her chest pain at that time was likely related to family stressors      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  There are fans  She works from 4:00 a m  In the morning in till 3:00 p m  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  She wears a mask during these 11 hours  She reports that 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  She was working when she developed which she describes as chest pressure/heaviness, shortness of breath, dizziness, blurred vision, and tingling in her lips  She reports that she has been getting calls every evening about her son who was noted to be addicted to heroin  She has been unable to sleep secondary to the stress related to this  She cares for her grandson who is autistic given that her daughter is also addicted to drugs  She reports that last evening she was called and informed that her son was arrested  She feels better and that he is now safe after being arrested  She woke up this morning and had left arm pain and therefore came to the emergency department for further evaluation  She was given nitro and aspirin in the emergency department  She reports that she feels somewhat better since admission  It is not worsened or improved in any fashion by any intervention  There is no exertional component  Worse with palpation  She is wondering if she needs to be on unemployment given she does not think she can continue to work with the mask on a significant heat for the hours that she does  Review of Systems:    Review of Systems   Constitutional: Positive for fatigue  Negative for chills, diaphoresis, fever and unexpected weight change  HENT: Negative for sore throat  Respiratory: Positive for chest tightness and shortness of breath  Negative for cough  Cardiovascular: Positive for chest pain  Negative for palpitations and leg swelling  Gastrointestinal: Negative for abdominal pain, diarrhea, nausea and vomiting  Genitourinary: Negative for dysuria  Musculoskeletal: Negative for myalgias     Neurological: Positive for dizziness, weakness, light-headedness and numbness  Negative for syncope and headaches  Psychiatric/Behavioral: Negative for confusion  All other systems reviewed and are negative  Past Medical and Surgical History:     Past Medical History:   Diagnosis Date    Fibroids     History of ovarian cyst     Hyperlipidemia     Hypertension     Migraine     Thyroid nodule        Past Surgical History:   Procedure Laterality Date    ENDOMETRIAL BIOPSY      W/o cervical dilation  Resolved 9/30/2014      TONSILLECTOMY      Age 6      TUBAL LIGATION         Meds/Allergies:    Prior to Admission medications    Medication Sig Start Date End Date Taking? Authorizing Provider   aspirin 81 mg chewable tablet Chew 1 tablet (81 mg total) daily 3/10/20  Yes John Trevizo MD   atorvastatin (LIPITOR) 40 mg tablet TAKE 1 TABLET (40 MG TOTAL) BY MOUTH DAILY WITH DINNER 4/22/20  Yes John Trevizo MD   bacitracin-polymyxin b (POLYSPORIN) ointment Apply topically 2 (two) times a day 4/26/20  Yes Beau Arce PA-C   hydrochlorothiazide (HYDRODIURIL) 25 mg tablet TAKE 1 TABLET BY MOUTH EVERY DAY 4/22/20  Yes John Trevizo MD   metoprolol tartrate (LOPRESSOR) 25 mg tablet TAKE 1 TABLET (25 MG TOTAL) BY MOUTH EVERY 12 (TWELVE) HOURS 6/10/20  Yes John Trevizo MD   pantoprazole (PROTONIX) 40 mg tablet Take 1 tablet (40 mg total) by mouth daily 6/4/20  Yes Deo Landis MD     I have reviewed home medications with patient personally      Allergies: No Known Allergies    Social History:     Marital Status:    Occupation: clothing   Patient Pre-hospital Living Situation: at home raising grandson  Patient Pre-hospital Level of Mobility: no restriction  Patient Pre-hospital Diet Restrictions: none  Substance Use History:   Social History     Substance and Sexual Activity   Alcohol Use Yes    Comment: occasional     Social History     Tobacco Use   Smoking Status Never Smoker   Smokeless Tobacco Never Used     Social History     Substance and Sexual Activity   Drug Use Never       Family History:    Family History   Problem Relation Age of Onset    Arthritis Mother     Diverticulitis Mother     Heart disease Mother     Hypertension Mother     Migraines Mother     Thyroid disease Mother     Arthritis Maternal Grandmother     Diabetes Maternal Grandmother     Heart disease Maternal Grandmother     Varicose Veins Maternal Grandmother     Heart attack Maternal Grandmother     Colon cancer Maternal Grandfather 58    Diabetes Paternal Grandmother     Heart disease Paternal Grandmother     Heart attack Paternal Grandmother     Heart disease Father     Heart disease Sister     Hypertension Sister     No Known Problems Daughter     Clotting disorder Paternal Grandfather     Heart disease Brother     Heart murmur Brother     No Known Problems Son     No Known Problems Son     Breast cancer Other        Physical Exam:     Vitals:   Blood Pressure: 139/63 (07/03/20 1500)  Pulse: 80 (07/03/20 1530)  Temperature: 98 6 °F (37 °C) (07/03/20 1219)  Temp Source: Oral (07/03/20 1219)  Respirations: 20 (07/03/20 1219)  Weight - Scale: 67 9 kg (149 lb 11 1 oz) (07/03/20 1219)  SpO2: 98 % (07/03/20 1530)    Physical Exam   Constitutional: She is oriented to person, place, and time  No distress  HENT:   Head: Normocephalic and atraumatic  Eyes: No scleral icterus  Cardiovascular: Normal rate, regular rhythm, normal heart sounds and intact distal pulses  No murmur heard  Pulmonary/Chest: Effort normal and breath sounds normal  No accessory muscle usage  No respiratory distress  She has no wheezes  She has no rales  She exhibits tenderness ( reproducible to palpation)  Abdominal: Soft  Bowel sounds are normal  She exhibits no distension  There is no tenderness  There is no rigidity, no rebound and no guarding  Musculoskeletal: She exhibits no edema     Neurological: She is alert and oriented to person, place, and time  No cranial nerve deficit or sensory deficit  She exhibits normal muscle tone  Skin: Skin is warm and dry  No rash noted  She is not diaphoretic  No erythema  Psychiatric:   Anxious, tearful   Nursing note and vitals reviewed  Additional Data:     Lab Results: I have personally reviewed pertinent reports  Results from last 7 days   Lab Units 07/03/20  1325   WBC Thousand/uL 7 33   HEMOGLOBIN g/dL 13 5   HEMATOCRIT % 42 2   PLATELETS Thousands/uL 266   NEUTROS PCT % 56   LYMPHS PCT % 35   MONOS PCT % 6   EOS PCT % 2     Results from last 7 days   Lab Units 07/03/20  1325   SODIUM mmol/L 137   POTASSIUM mmol/L 3 4*   CHLORIDE mmol/L 102   CO2 mmol/L 28   BUN mg/dL 12   CREATININE mg/dL 0 81   ANION GAP mmol/L 7   CALCIUM mg/dL 9 6   ALBUMIN g/dL 4 0   TOTAL BILIRUBIN mg/dL 0 28   ALK PHOS U/L 113   ALT U/L 26   AST U/L 19   GLUCOSE RANDOM mg/dL 92     Results from last 7 days   Lab Units 07/03/20  1325   INR  0 93                   Imaging: I have personally reviewed pertinent reports  XR chest 1 view portable   ED Interpretation by Demar Carter PA-C (07/03 9389)   No acute cardiopulmonary disease          EKG, Pathology, and Other Studies Reviewed on Admission:   · EKG: NSR 70 bpm  No acute ST/T wave changes  Allscripts / Epic Records Reviewed: Yes     ** Please Note: This note has been constructed using a voice recognition system   **

## 2020-07-03 NOTE — ASSESSMENT & PLAN NOTE
· Coronary angiography performed in February 2020 for symptoms consistent with unstable angina showed no evidence of occlusive coronary artery disease  All vessels appeared normal with no evidence of disease  It was felt that this was likely related to stressors at home  · Troponin on admission negative  · EKG on admission:  Normal sinus rhythm 70 beats per minute with no acute ST/T-wave changes  · Trend troponins x3  · Monitor on telemetry  · Repeat EKG with serial troponins  · Check lipid panel and A1c  · Continue Lipitor and aspirin  · EDWARD score equals 2 secondary to use of aspirin and risk factors  · Suspect this is related to significant anxiety from life stressors including her son abusing heroin and being arrested this week vs muscular in etiology given reproducible on exam   · No indication for ST or further ischemic workup at this time

## 2020-07-03 NOTE — ED PROVIDER NOTES
History  Chief Complaint   Patient presents with    Chest Pain     PT presents to ED with c/o "chest tightness, SOB, dizziness x3 days, today started wtih blurred vision"     Patient presents emergency room with an onset of left-sided chest pain  She describes it as a heavy tightness and pressure sensation  It radiates to her left arm and her neck and her back  It has been constant for 3 days  Nothing makes it better or makes it worse  She denies any associated nausea or diaphoresis  She denies any shortness of breath  She denies any recent injuries or illnesses consisting of coughing, sore throat, postnasal drip, nasal congestion  She denies any fever chills  She denies any abdominal pain, black tarry stools, bright red blood per stools  She has a positive family history of grandparents having coronary artery disease  Patient does not smoke, drink alcohol, and denies street drug use  Differential diagnosis includes but is not limited to acute coronary syndrome, myocarditis, pericarditis, aortic dissection, pulmonary embolism, esophagitis, gastritis, anxiety, viral syndrome          History provided by:  Patient  Chest Pain   Pain location:  L chest  Pain quality: pressure and tightness    Pain radiates to:  L arm, neck and mid back  Pain radiates to the back: yes    Pain severity:  Moderate  Onset quality:  Sudden  Duration:  3 days  Timing:  Constant  Chronicity:  New  Context: at rest    Context: not breathing, no drug use, not eating, not lifting, no movement, not raising an arm, no stress and no trauma    Relieved by:  Nothing  Worsened by:  Nothing tried  Ineffective treatments:  None tried  Associated symptoms: anxiety    Associated symptoms: no abdominal pain, no AICD problem, no altered mental status, no anorexia, no back pain, no claudication, no cough, no diaphoresis, no dizziness, no dysphagia, no fatigue, no fever, no headache, no heartburn, no lower extremity edema, no nausea, no near-syncope, no numbness, no orthopnea, no palpitations, no PND, no shortness of breath, no syncope, not vomiting and no weakness    Risk factors: no aortic disease, no birth control, no coronary artery disease, no diabetes mellitus, no Brody-Danlos syndrome, no high cholesterol, no hypertension, no immobilization, not male, no Marfan's syndrome, not obese, not pregnant, no prior DVT/PE, no smoking and no surgery        Prior to Admission Medications   Prescriptions Last Dose Informant Patient Reported? Taking?   aspirin 81 mg chewable tablet   No Yes   Sig: Chew 1 tablet (81 mg total) daily   atorvastatin (LIPITOR) 40 mg tablet   No Yes   Sig: TAKE 1 TABLET (40 MG TOTAL) BY MOUTH DAILY WITH DINNER   bacitracin-polymyxin b (POLYSPORIN) ointment   No Yes   Sig: Apply topically 2 (two) times a day   hydrochlorothiazide (HYDRODIURIL) 25 mg tablet   No Yes   Sig: TAKE 1 TABLET BY MOUTH EVERY DAY   metoprolol tartrate (LOPRESSOR) 25 mg tablet   No Yes   Sig: TAKE 1 TABLET (25 MG TOTAL) BY MOUTH EVERY 12 (TWELVE) HOURS   pantoprazole (PROTONIX) 40 mg tablet   No Yes   Sig: Take 1 tablet (40 mg total) by mouth daily      Facility-Administered Medications: None       Past Medical History:   Diagnosis Date    Fibroids     History of ovarian cyst     Hyperlipidemia     Hypertension     Migraine     Thyroid nodule        Past Surgical History:   Procedure Laterality Date    ENDOMETRIAL BIOPSY      W/o cervical dilation   Resolved 9/30/2014      TONSILLECTOMY      Age 6      TUBAL LIGATION         Family History   Problem Relation Age of Onset    Arthritis Mother     Diverticulitis Mother     Heart disease Mother     Hypertension Mother    Meaghan Riis Migraines Mother     Thyroid disease Mother     Arthritis Maternal Grandmother     Diabetes Maternal Grandmother     Heart disease Maternal Grandmother     Varicose Veins Maternal Grandmother     Heart attack Maternal Grandmother     Colon cancer Maternal Grandfather 58  Diabetes Paternal Grandmother     Heart disease Paternal Grandmother     Heart attack Paternal Grandmother     Heart disease Father     Heart disease Sister     Hypertension Sister     No Known Problems Daughter     Clotting disorder Paternal Grandfather     Heart disease Brother     Heart murmur Brother     No Known Problems Son     No Known Problems Son     Breast cancer Other      I have reviewed and agree with the history as documented  E-Cigarette/Vaping    E-Cigarette Use Never User      E-Cigarette/Vaping Substances    Nicotine No     THC No     CBD No     Flavoring No     Other No     Unknown No      Social History     Tobacco Use    Smoking status: Never Smoker    Smokeless tobacco: Never Used   Substance Use Topics    Alcohol use: Yes     Comment: occasional    Drug use: Never       Review of Systems   Constitutional: Positive for activity change  Negative for appetite change, chills, diaphoresis, fatigue and fever  HENT: Negative for congestion, dental problem, ear discharge, ear pain, mouth sores, postnasal drip, rhinorrhea, sore throat and trouble swallowing  Eyes: Negative for pain, discharge, redness and itching  Respiratory: Negative for cough, chest tightness and shortness of breath  Cardiovascular: Positive for chest pain  Negative for palpitations, orthopnea, claudication, syncope, PND and near-syncope  Gastrointestinal: Negative for abdominal pain, anorexia, heartburn, nausea and vomiting  Endocrine: Negative for cold intolerance, heat intolerance, polydipsia and polyphagia  Musculoskeletal: Negative for back pain and gait problem  Skin: Negative for color change, pallor and rash  Neurological: Negative for dizziness, weakness, numbness and headaches  Psychiatric/Behavioral: Negative for confusion  All other systems reviewed and are negative  Physical Exam  Physical Exam   Constitutional: She is oriented to person, place, and time   She appears well-developed and well-nourished  Non-toxic appearance  She does not appear ill  No distress  HENT:   Head: Normocephalic  Neck: Neck supple  No hepatojugular reflux and no JVD present  No tracheal deviation present  No thyromegaly present  Cardiovascular: Normal rate and regular rhythm  Exam reveals no gallop, no S3 and no S4  No murmur heard  Pulmonary/Chest: Effort normal    Musculoskeletal:        Right lower leg: She exhibits no tenderness and no edema  Left lower leg: She exhibits no tenderness and no edema  Lymphadenopathy:     She has no cervical adenopathy  Neurological: She is alert and oriented to person, place, and time  Skin: Capillary refill takes less than 2 seconds  Psychiatric: She has a normal mood and affect  Her behavior is normal    Nursing note and vitals reviewed        Vital Signs  ED Triage Vitals [07/03/20 1219]   Temperature Pulse Respirations Blood Pressure SpO2   98 6 °F (37 °C) 71 20 145/72 99 %      Temp Source Heart Rate Source Patient Position - Orthostatic VS BP Location FiO2 (%)   Oral Monitor Lying Right arm --      Pain Score       9           Vitals:    07/03/20 1500 07/03/20 1515 07/03/20 1530 07/03/20 1633   BP: 139/63   146/94   Pulse: 68 76 80 78   Patient Position - Orthostatic VS:             Visual Acuity      ED Medications  Medications   aspirin chewable tablet 81 mg (has no administration in time range)   atorvastatin (LIPITOR) tablet 40 mg (40 mg Oral Given 7/3/20 1658)   metoprolol tartrate (LOPRESSOR) tablet 25 mg (has no administration in time range)   hydrochlorothiazide (HYDRODIURIL) tablet 25 mg (has no administration in time range)   pantoprazole (PROTONIX) EC tablet 40 mg (has no administration in time range)   enoxaparin (LOVENOX) subcutaneous injection 40 mg (has no administration in time range)   ondansetron (ZOFRAN) injection 4 mg (has no administration in time range)   aluminum-magnesium hydroxide-simethicone (MYLANTA) 200-200-20 mg/5 mL oral suspension 30 mL (has no administration in time range)   acetaminophen (TYLENOL) tablet 650 mg (650 mg Oral Given 7/3/20 1803)   aspirin chewable tablet 324 mg (324 mg Oral Given 7/3/20 1432)   nitroglycerin (NITROSTAT) SL tablet 0 4 mg (0 4 mg Sublingual Given 7/3/20 1432)   potassium chloride (K-DUR,KLOR-CON) CR tablet 20 mEq (20 mEq Oral Given 7/3/20 1658)       Diagnostic Studies  Results Reviewed     Procedure Component Value Units Date/Time    NT-BNP PRO [[id number]]  (Normal) Collected:  07/03/20 1325    Lab Status:  Final result Specimen:  Blood from Arm, Left Updated:  07/03/20 1749     NT-proBNP 64 pg/mL     Troponin I [[id number]]  (Normal) Collected:  07/03/20 1325    Lab Status:  Final result Specimen:  Blood from Arm, Left Updated:  07/03/20 1349     Troponin I <0 02 ng/mL     Comprehensive metabolic panel [[id number]]  (Abnormal) Collected:  07/03/20 1325    Lab Status:  Final result Specimen:  Blood from Arm, Left Updated:  07/03/20 1346     Sodium 137 mmol/L      Potassium 3 4 mmol/L      Chloride 102 mmol/L      CO2 28 mmol/L      ANION GAP 7 mmol/L      BUN 12 mg/dL      Creatinine 0 81 mg/dL      Glucose 92 mg/dL      Calcium 9 6 mg/dL      AST 19 U/L      ALT 26 U/L      Alkaline Phosphatase 113 U/L      Total Protein 7 5 g/dL      Albumin 4 0 g/dL      Total Bilirubin 0 28 mg/dL      eGFR 83 ml/min/1 73sq m     Narrative:       Natalya guidelines for Chronic Kidney Disease (CKD):     Stage 1 with normal or high GFR (GFR > 90 mL/min/1 73 square meters)    Stage 2 Mild CKD (GFR = 60-89 mL/min/1 73 square meters)    Stage 3A Moderate CKD (GFR = 45-59 mL/min/1 73 square meters)    Stage 3B Moderate CKD (GFR = 30-44 mL/min/1 73 square meters)    Stage 4 Severe CKD (GFR = 15-29 mL/min/1 73 square meters)    Stage 5 End Stage CKD (GFR <15 mL/min/1 73 square meters)  Note: GFR calculation is accurate only with a steady state creatinine    APTT [[id number]]  (Normal) Collected:  07/03/20 1325    Lab Status:  Final result Specimen:  Blood from Arm, Left Updated:  07/03/20 1342     PTT 30 seconds     Protime-INR [[id number]]  (Normal) Collected:  07/03/20 1325    Lab Status:  Final result Specimen:  Blood from Arm, Left Updated:  07/03/20 1342     Protime 11 9 seconds      INR 0 93    CBC and differential [[id number]] Collected:  07/03/20 1325    Lab Status:  Final result Specimen:  Blood from Arm, Left Updated:  07/03/20 1332     WBC 7 33 Thousand/uL      RBC 4 53 Million/uL      Hemoglobin 13 5 g/dL      Hematocrit 42 2 %      MCV 93 fL      MCH 29 8 pg      MCHC 32 0 g/dL      RDW 13 2 %      MPV 10 5 fL      Platelets 871 Thousands/uL      nRBC 0 /100 WBCs      Neutrophils Relative 56 %      Immat GRANS % 0 %      Lymphocytes Relative 35 %      Monocytes Relative 6 %      Eosinophils Relative 2 %      Basophils Relative 1 %      Neutrophils Absolute 4 06 Thousands/µL      Immature Grans Absolute 0 02 Thousand/uL      Lymphocytes Absolute 2 55 Thousands/µL      Monocytes Absolute 0 47 Thousand/µL      Eosinophils Absolute 0 17 Thousand/µL      Basophils Absolute 0 06 Thousands/µL                  XR chest 1 view portable   ED Interpretation by Joshua Shaikh PA-C (07/03 1419)   No acute cardiopulmonary disease                 Procedures  ECG 12 Lead Documentation Only  Date/Time: 7/3/2020 1:10 PM  Performed by: Joshua Shaikh PA-C  Authorized by: Joshua Shaikh PA-C     Indications / Diagnosis:  Chest pain  ECG reviewed by me, the ED Provider: yes    Patient location:  ED  Previous ECG:     Previous ECG:  Unavailable    Comparison to cardiac monitor: Yes    Interpretation:     Interpretation: normal    Rate:     ECG rate:  70    ECG rate assessment: normal    Rhythm:     Rhythm: sinus rhythm    Ectopy:     Ectopy: none    QRS:     QRS axis:  Normal    QRS intervals:  Normal  Conduction:     Conduction: normal    ST segments:     ST segments: Normal  T waves:     T waves: normal    Comments:      No signs of acute ischemia    Independently interpreted by me             ED Course  ED Course as of Jul 03 2009 Fri Jul 03, 2020   1440 Chest pain completely resolved after 1 nitroglycerin tablet and 4 baby aspirins  When admission for 23 hour chest pain rule out                  HEART Risk Score      Most Recent Value   Heart Score Risk Calculator   History  1 Filed at: 07/03/2020 1421   ECG  0 Filed at: 07/03/2020 1421   Age  1 Filed at: 07/03/2020 1421   Risk Factors  2 Filed at: 07/03/2020 1421   Troponin  0 Filed at: 07/03/2020 1421   HEART Score  4 Filed at: 07/03/2020 1421                        EDWARD Risk Score      Most Recent Value   Age >= 65  0 Filed at: 07/03/2020 1500   Known CAD (stenosis >= 50%)  0 Filed at: 07/03/2020 1500   Recent (<=24 hrs) Service Angina  0 Filed at: 07/03/2020 1500   ST Deviation >= 0 5 mm  0 Filed at: 07/03/2020 1500   3+ CAD Risk Factors (FHx, HTN, HLP, DM, Smoker)  1 Filed at: 07/03/2020 1500   Aspirin Use Past 7 Days  1 Filed at: 07/03/2020 1500   Elevated Cardiac Markers  0 Filed at: 07/03/2020 1500   EDWARD Risk Score (Calculated)  2 Filed at: 07/03/2020 1500                    MDM  Number of Diagnoses or Management Options  Chest pain, unspecified type: new and requires workup     Amount and/or Complexity of Data Reviewed  Clinical lab tests: ordered and reviewed  Tests in the radiology section of CPT®: ordered and reviewed  Tests in the medicine section of CPT®: ordered and reviewed    Risk of Complications, Morbidity, and/or Mortality  Presenting problems: high  Diagnostic procedures: high  Management options: high  General comments: Patient presents to the emergency room with complaints of left-sided chest tenderness that radiates to her neck left shoulder and left arm  She was seen and evaluated  Laboratory studies were taken and were reviewed  They were within normal limits    Her EKG was normal and did not demonstrate any signs of acute ischemia  The patient's chest pain was constant  It was relieved with 1 nitroglycerin and 4 baby aspirins were given as well  Patient was admitted to the [facility] service for an observation stay for further do a shin and management      Patient Progress  Patient progress: stable        Disposition  Final diagnoses:   Chest pain, unspecified type     Time reflects when diagnosis was documented in both MDM as applicable and the Disposition within this note     Time User Action Codes Description Comment    7/3/2020  3:02 PM Ruperto Montero Add [R07 9] Chest pain, unspecified type       ED Disposition     ED Disposition Condition Date/Time Comment    Admit Stable Fri Jul 3, 2020  3:03 PM Case was discussed with Nickie More and the patient's admission status was agreed to be Admission Status: observation status to the service of Dr Himanshu Conway    None         Current Discharge Medication List      CONTINUE these medications which have NOT CHANGED    Details   aspirin 81 mg chewable tablet Chew 1 tablet (81 mg total) daily  Qty: 90 tablet, Refills: 3    Associated Diagnoses: Essential hypertension; Elevated cholesterol      atorvastatin (LIPITOR) 40 mg tablet TAKE 1 TABLET (40 MG TOTAL) BY MOUTH DAILY WITH DINNER  Qty: 90 tablet, Refills: 3    Associated Diagnoses: Chest pain, unspecified type      bacitracin-polymyxin b (POLYSPORIN) ointment Apply topically 2 (two) times a day  Qty: 15 g, Refills: 0    Associated Diagnoses: Foreign body of right ear, initial encounter      hydrochlorothiazide (HYDRODIURIL) 25 mg tablet TAKE 1 TABLET BY MOUTH EVERY DAY  Qty: 90 tablet, Refills: 3    Associated Diagnoses: Hypertensive urgency      metoprolol tartrate (LOPRESSOR) 25 mg tablet TAKE 1 TABLET (25 MG TOTAL) BY MOUTH EVERY 12 (TWELVE) HOURS  Qty: 180 tablet, Refills: 3    Associated Diagnoses: Hypertensive urgency      pantoprazole (PROTONIX) 40 mg tablet Take 1 tablet (40 mg total) by mouth daily  Qty: 30 tablet, Refills: 3    Associated Diagnoses: Left upper quadrant pain           No discharge procedures on file      PDMP Review     None          ED Provider  Electronically Signed by           Haja Toribio PA-C  07/03/20 2011

## 2020-07-03 NOTE — ASSESSMENT & PLAN NOTE
· Status post EGD and colonoscopy on 6/4/2020 which was noted to show antral erosions  Was recommended to undergo Protonix treatment for 3 months  · Biopsies unremarkable    Negative for H pylori  · Continue protonix daily

## 2020-07-04 VITALS
HEIGHT: 60 IN | HEART RATE: 66 BPM | DIASTOLIC BLOOD PRESSURE: 74 MMHG | BODY MASS INDEX: 30.04 KG/M2 | TEMPERATURE: 98 F | SYSTOLIC BLOOD PRESSURE: 137 MMHG | WEIGHT: 153 LBS | RESPIRATION RATE: 18 BRPM | OXYGEN SATURATION: 96 %

## 2020-07-04 PROBLEM — R07.9 CHEST PAIN: Status: RESOLVED | Noted: 2019-05-02 | Resolved: 2020-07-04

## 2020-07-04 PROBLEM — R06.02 SOB (SHORTNESS OF BREATH): Status: RESOLVED | Noted: 2020-07-03 | Resolved: 2020-07-04

## 2020-07-04 PROBLEM — R42 DIZZINESS: Status: RESOLVED | Noted: 2020-07-03 | Resolved: 2020-07-04

## 2020-07-04 LAB
ATRIAL RATE: 53 BPM
ATRIAL RATE: 54 BPM
ATRIAL RATE: 71 BPM
P AXIS: 10 DEGREES
P AXIS: 12 DEGREES
P AXIS: 38 DEGREES
PR INTERVAL: 164 MS
PR INTERVAL: 170 MS
PR INTERVAL: 174 MS
QRS AXIS: 16 DEGREES
QRS AXIS: 17 DEGREES
QRS AXIS: 23 DEGREES
QRSD INTERVAL: 80 MS
QRSD INTERVAL: 82 MS
QRSD INTERVAL: 82 MS
QT INTERVAL: 422 MS
QT INTERVAL: 490 MS
QT INTERVAL: 498 MS
QTC INTERVAL: 458 MS
QTC INTERVAL: 464 MS
QTC INTERVAL: 467 MS
T WAVE AXIS: 38 DEGREES
T WAVE AXIS: 40 DEGREES
T WAVE AXIS: 44 DEGREES
VENTRICULAR RATE: 53 BPM
VENTRICULAR RATE: 54 BPM
VENTRICULAR RATE: 71 BPM

## 2020-07-04 PROCEDURE — 99217 PR OBSERVATION CARE DISCHARGE MANAGEMENT: CPT | Performed by: PHYSICIAN ASSISTANT

## 2020-07-04 PROCEDURE — 93010 ELECTROCARDIOGRAM REPORT: CPT | Performed by: INTERNAL MEDICINE

## 2020-07-04 RX ADMIN — PANTOPRAZOLE SODIUM 40 MG: 40 TABLET, DELAYED RELEASE ORAL at 08:45

## 2020-07-04 RX ADMIN — ACETAMINOPHEN 650 MG: 325 TABLET, FILM COATED ORAL at 05:13

## 2020-07-04 RX ADMIN — ASPIRIN 81 MG 81 MG: 81 TABLET ORAL at 08:45

## 2020-07-04 RX ADMIN — METOPROLOL TARTRATE 25 MG: 25 TABLET, FILM COATED ORAL at 08:45

## 2020-07-04 RX ADMIN — ENOXAPARIN SODIUM 40 MG: 40 INJECTION SUBCUTANEOUS at 08:45

## 2020-07-04 RX ADMIN — HYDROCHLOROTHIAZIDE 25 MG: 25 TABLET ORAL at 08:45

## 2020-07-04 NOTE — PLAN OF CARE
Problem: COPING  Goal: Will report anxiety at manageable levels  Description  INTERVENTIONS:  - Administer medication as ordered  - Teach and encourage coping skills  - Provide emotional support  - Assess patient/family for anxiety and ability to cope  Outcome: Progressing

## 2020-07-04 NOTE — NURSING NOTE
Pt discharged home  Reviewed all the discharge instructions with the pt  Gathered all the belongings  Pt acknowledged all the discharge instructions  Pt walked downstairs and left the floor in a stable condition

## 2020-07-04 NOTE — ASSESSMENT & PLAN NOTE
· Coronary angiography performed in February 2020 for symptoms consistent with unstable angina showed no evidence of occlusive coronary artery disease  All vessels appeared normal with no evidence of disease  It was felt that this was likely related to stressors at home  · Troponin on admission negative  · EKG on admission:  Normal sinus rhythm 70 beats per minute with no acute ST/T-wave changes  · Troponin x3 negative  · No events on telemetry  · Serial EKGs without ischemic changes  · Check lipid panel and A1c as outpatient  · Continue Lipitor and aspirin  · EDWARD score equals 2 secondary to use of aspirin and risk factors  · Suspect this is related to significant anxiety from life stressors including her son abusing heroin and being arrested this week vs muscular in etiology given reproducible on exam   · No indication for ST or further ischemic workup at this time     · Outpatient follow-up with Cardiology

## 2020-07-04 NOTE — UTILIZATION REVIEW
Initial Clinical Review    Admission: Date/Time/Statement: Admission Orders (From admission, onward)     Ordered        07/03/20 1504  Place in Observation (expected length of stay for this patient is less than two midnights)  Once                   Orders Placed This Encounter   Procedures    Place in Observation (expected length of stay for this patient is less than two midnights)     Standing Status:   Standing     Number of Occurrences:   1     Order Specific Question:   Admitting Physician     Answer:   Andrea Russell [[id number]]     Order Specific Question:   Level of Care     Answer:   Med Surg [[id number]]     ED Arrival Information     Expected Arrival Acuity Means of Arrival Escorted By Service Admission Type    - 7/3/2020 12:10 Urgent Walk-In Self General Medicine Urgent    Arrival Complaint    Chest Pain (x 3 days)        Chief Complaint   Patient presents with    Chest Pain     PT presents to ED with c/o "chest tightness, SOB, dizziness x3 days, today started wtih blurred vision"     Assessment/Plan: this is a 49year old female from home to ED admitted to observation due to chest pain/dizziness/shortness of breath  Presented due to worsening  Lest sided chest pain, heavy tightness with pressure radiating to left arm, neck and back  For last 3 days  Has increased stress   Feels short of breath at work, facility is hot and wears mask, has periods dizziness with visual changes and lip tinglin     Exam non focal  EDWARD score of 2  K 3 4  Troponin <0 02  EKG with normal ST and T  In the ED chest pain resolved with ntg and 4 baby asa    Telemetry, serial troponin, continue asa and Lipitor in progress     ED Triage Vitals [07/03/20 1219]   Temperature Pulse Respirations Blood Pressure SpO2   98 6 °F (37 °C) 71 20 145/72 99 %      Temp Source Heart Rate Source Patient Position - Orthostatic VS BP Location FiO2 (%)   Oral Monitor Lying Right arm --      Pain Score       9        Wt Readings from Last 1 Encounters: 07/03/20 69 4 kg (153 lb)     Additional Vital Signs:   07/03/20 2237  97 9 °F (36 6 °C)  55  18  125/59    97 %  None (Room air)  Lying   07/03/20 2100    67    138/67           07/03/20 1633  97 9 °F (36 6 °C)  78  20  146/94  117  99 %  None (Room air)       Heart Score Risk Calculator   History  1 Filed at: 07/03/2020 1421   ECG  0 Filed at: 07/03/2020 1421   Age  1 Filed at: 07/03/2020 1421   Risk Factors  2 Filed at: 07/03/2020 1421   Troponin  0 Filed at: 07/03/2020 1421   HEART Score  4 Filed at: 07/03/2020 1421     EDWARD Risk Score      Most Recent Value   Age >= 65  0 Filed at: 07/03/2020 1500   Known CAD (stenosis >= 50%)  0 Filed at: 07/03/2020 1500   Recent (<=24 hrs) Service Angina  0 Filed at: 07/03/2020 1500   ST Deviation >= 0 5 mm  0 Filed at: 07/03/2020 1500   3+ CAD Risk Factors (FHx, HTN, HLP, DM, Smoker)  1 Filed at: 07/03/2020 1500   Aspirin Use Past 7 Days  1 Filed at: 07/03/2020 1500   Elevated Cardiac Markers  0 Filed at: 07/03/2020 1500   EDWARD Risk Score (Calculated)  2 Filed at: 07/03/2020 1500       Pertinent Labs/Diagnostic Test Results:   7/3/2020 EKG Normal sinus rhythm  Normal ECG  When compared with ECG of 02-MAY-2019 20:50,  Nonspecific T wave abnormality no longer evident in Lateral leads      Results from last 7 days   Lab Units 07/03/20  1325   WBC Thousand/uL 7 33   HEMOGLOBIN g/dL 13 5   HEMATOCRIT % 42 2   PLATELETS Thousands/uL 266   NEUTROS ABS Thousands/µL 4 06     Results from last 7 days   Lab Units 07/03/20  1325   SODIUM mmol/L 137   POTASSIUM mmol/L 3 4*   CHLORIDE mmol/L 102   CO2 mmol/L 28   ANION GAP mmol/L 7   BUN mg/dL 12   CREATININE mg/dL 0 81   EGFR ml/min/1 73sq m 83   CALCIUM mg/dL 9 6     Results from last 7 days   Lab Units 07/03/20  1325   AST U/L 19   ALT U/L 26   ALK PHOS U/L 113   TOTAL PROTEIN g/dL 7 5   ALBUMIN g/dL 4 0   TOTAL BILIRUBIN mg/dL 0 28     Results from last 7 days   Lab Units 07/03/20  1325   GLUCOSE RANDOM mg/dL 92     Results from last 7 days   Lab Units 07/03/20  2236 07/03/20  1759 07/03/20  1325   TROPONIN I ng/mL <0 02 <0 02 <0 02     Results from last 7 days   Lab Units 07/03/20  1325   PROTIME seconds 11 9   INR  0 93   PTT seconds 30     Results from last 7 days   Lab Units 07/03/20  1325   NT-PRO BNP pg/mL 64     ED Treatment:   Medication Administration from 07/03/2020 1210 to 07/03/2020 1631       Date/Time Order Dose Route Action Comments     07/03/2020 1432 aspirin chewable tablet 324 mg 324 mg Oral Given      07/03/2020 1432 nitroglycerin (NITROSTAT) SL tablet 0 4 mg 0 4 mg Sublingual Given         Past Medical History:   Diagnosis Date    Fibroids     History of ovarian cyst     Hyperlipidemia     Hypertension     Migraine     Thyroid nodule      Present on Admission:   Hypertension   Hyperlipidemia      Admitting Diagnosis: Chest pain [R07 9]  Chest pain, unspecified type [R07 9]  Age/Sex: 49 y o  female  Admission Orders: 7/3/2020 1504 Observation   Scheduled Medications:  Medications:  aspirin 81 mg Oral Daily   atorvastatin 40 mg Oral Daily With Dinner   enoxaparin 40 mg Subcutaneous Daily   hydrochlorothiazide 25 mg Oral Daily   metoprolol tartrate 25 mg Oral Q12H [address]   pantoprazole 40 mg Oral Daily     Continuous IV Infusions: none     PRN Meds:  Acetaminophen - used x 2  650 mg Oral Q6H PRN   aluminum-magnesium hydroxide-simethicone 30 mL Oral Q6H PRN   ondansetron 4 mg Intravenous Q6H PRN     telemetry    Network Utilization Review Department  [email or web address]  ATTENTION: Please call with any questions or concerns to [phone] and carefully listen to the prompts so that you are directed to the right person  All voicemails are confidential   Spivey Tahira all requests for admission clinical reviews, approved or denied determinations and any other requests to dedicated fax number below belonging to the campus where the patient is receiving treatment   List of dedicated fax numbers for the Facilities:  FACILITY NAME UR FAX NUMBER   ADMISSION DENIALS (Administrative/Medical Necessity) [phone]   PARENT CHILD HEALTH (Maternity/NICU/Pediatrics) [phone]   ST  Jennifer Ramin [phone]   [place] [phone]   Deven Mace [phone]   [address] [phone]   [facility]  [phone]   [address] [place] [address], [address]  [address] [facility] [address] [facility] [phone]

## 2020-07-04 NOTE — ASSESSMENT & PLAN NOTE
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

## 2020-07-04 NOTE — DISCHARGE SUMMARY
[facility]  Discharge- [facility] 1965, 49 y o  female MRN: [id number]  Unit/Bed#: S -01 Encounter: [id number]  Primary Care Provider: Noelle Steel DO   Date and time admitted to hospital: 7/3/2020 12:14 PM    * Chest pain  Assessment & Plan  · Coronary angiography performed in February 2020 for symptoms consistent with unstable angina showed no evidence of occlusive coronary artery disease  All vessels appeared normal with no evidence of disease  It was felt that this was likely related to stressors at home  · Troponin on admission negative  · EKG on admission:  Normal sinus rhythm 70 beats per minute with no acute ST/T-wave changes  · Troponin x3 negative  · No events on telemetry  · Serial EKGs without ischemic changes  · Check lipid panel and A1c as outpatient  · Continue Lipitor and aspirin  · EDWARD score equals 2 secondary to use of aspirin and risk factors  · Suspect this is related to significant anxiety from life stressors including her son abusing heroin and being arrested this week vs muscular in etiology given reproducible on exam   · No indication for ST or further ischemic workup at this time  · Outpatient follow-up with Cardiology    Dizziness  Assessment & Plan  · Patient reports significant dizziness for the past 3 days with visual changes and lip tingling  · She has been working for 11 hours a day in a facility that this not have air conditioning  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    · Suspect this is likely related to heat exhaustion from prolonged exposure in the heat while wearing a mask  · No evidence of orthostasis  · No evidence of cardiac etiology  · No focal neurologic deficits on exam and no indication for CT of the head imaging at this time    SOB (shortness of breath)  Assessment & Plan  · Suspect that this is related to anxiety and prolonged days in heat wearing mask at work  · Lung exam is unremarkable  · Chest x-ray negative on personal review  · No evidence of hypoxia  · Do not believe this is cardiac related  · Not exertional    Gastric erosions  Assessment & Plan  · Status post EGD and colonoscopy on 6/4/2020 which was noted to show antral erosions  Was recommended to undergo Protonix treatment for 3 months  · Biopsies unremarkable  Negative for H pylori  · Continue protonix daily    Hypertension  Assessment & Plan  · Continue hydrochlorothiazide and metoprolol  · Blood pressure currently 118/58    Hyperlipidemia  Assessment & Plan  · Continue Lipitor 40 mg daily    Discharging Physician / Practitioner: Trey Gonzalez PA-C  PCP: Reyna Saez DO  Admission Date:   Admission Orders (From admission, onward)     Ordered        07/03/20 1504  Place in Observation (expected length of stay for this patient is less than two midnights)  Once                   Discharge Date: 07/04/20    Resolved Problems  Date Reviewed: 7/3/2020    None          Consultations During Hospital Stay:  · none    Procedures Performed:   · CXR: formal report pending  Personal review negative    Significant Findings / Test Results:   · Chest pain, Dizziness and SOB felt to be related to anxiety and heat exhaustion    Incidental Findings:   · None     Test Results Pending at Discharge (will require follow up): · Formal chest x-ray report     Outpatient Tests Requested:  · Outpatient follow-up with Cardiology  · Follow-up with PCP    Complications:  None    Reason for Admission:  Chest pain    Hospital Course:     Alissa Escalante is a 49 y o  female patient who originally presented to the hospital on 7/3/2020 due to chest pain and shortness of breath  She has past medical history of hypertension  and hyperlipidemia for untreated until 2019  She has a strong family history of premature vascular disease    In May of 2019 she was noted to be hypertensive and had chest pain and some was sent to the emergency department at which point she was noted to have CT scan, echocardiogram and a pharmacologic stress test which were unremarkable  The patient was noted to have had chest pain suggestive of angina previously in February at which point she underwent coronary angiography and was negative for any obstructive disease  It was felt that her chest pain at that time was likely related to family stressors      The patient works in a [employer]  which does not have air conditioning  There are fans  She works from 4:00 a m  In the morning in till 3:00 p m  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  She wears a mask during these 11 hours  She reports that 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  She was working when she developed which she describes as chest pressure/heaviness, shortness of breath, dizziness, blurred vision, and tingling in her lips  She reports that she has been getting calls every evening about her son who was noted to be addicted to heroin  She has been unable to sleep secondary to the stress related to this  She cares for her grandson who is autistic given that her daughter is also addicted to drugs  She reports that last evening she was called and informed that her son was arrested  She feels better and that he is now safe after being arrested  She woke up morning of admission and had left arm pain and therefore came to the emergency department for further evaluation  She was given nitro and aspirin in the emergency department  She reports that she feels somewhat better since admission  It is not worsened or improved in any fashion by any intervention  There is no exertional component  Worse with palpation  The patient was noted to have troponins x3 trended which were negative  EKGs were nonischemic  Troponins were negative  Telemetry was negative  Patient's recommended follow-up as an outpatient with Cardiology and primary care physician    This was felt to be related to anxiety/stress as well as heat exhaustion from 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  Please see above list of diagnoses and related plan for additional information  Condition at Discharge: stable     Discharge Day Visit / Exam:     Subjective: feels better- sometimes dizzy if changes position too slowly  Son in [facility] and will be for 2 months  Vitals: Blood Pressure: 137/74 (07/04/20 0851)  Pulse: 66 (07/04/20 0851)  Temperature: 98 °F (36 7 °C) (07/04/20 0700)  Temp Source: Oral (07/04/20 0700)  Respirations: 18 (07/04/20 0700)  Height: 5' (152 4 cm) (07/03/20 1633)  Weight - Scale: 69 4 kg (153 lb) (07/03/20 1633)  SpO2: 96 % (07/04/20 0700)  Exam:   Physical Exam   Constitutional: No distress  HENT:   Head: Normocephalic and atraumatic  Eyes: No scleral icterus  Cardiovascular: Normal rate, regular rhythm, normal heart sounds and intact distal pulses  No murmur heard  Pulmonary/Chest: Effort normal and breath sounds normal  No accessory muscle usage  No respiratory distress  She has no wheezes  She has no rales  Abdominal: Soft  Bowel sounds are normal  She exhibits no distension  There is no tenderness  There is no rigidity, no rebound and no guarding  Musculoskeletal: She exhibits no edema  Neurological: She is alert  Skin: Skin is warm and dry  No rash noted  She is not diaphoretic  No erythema  Psychiatric: She has a normal mood and affect  Her behavior is normal    Nursing note and vitals reviewed  Discussion with Family: patient    Discharge instructions/Information to patient and family:   See after visit summary for information provided to patient and family  Provisions for Follow-Up Care:  See after visit summary for information related to follow-up care and any pertinent home health orders  Disposition:     Home    Planned Readmission: no     Discharge Statement:  I spent 40 minutes discharging the patient  This time was spent on the day of discharge   I had direct contact with the patient on the day of discharge  Greater than 50% of the total time was spent examining patient, answering all patient questions, arranging and discussing plan of care with patient as well as directly providing post-discharge instructions  Additional time then spent on discharge activities  Discharge Medications:  See after visit summary for reconciled discharge medications provided to patient and family        ** Please Note: This note has been constructed using a voice recognition system **

## 2020-07-04 NOTE — DISCHARGE INSTR - AVS FIRST PAGE
Dear Nakia Denton,     It was our pleasure to care for you here at [facility]  It is our hope that we were always able to exceed the expected standards for your care during your stay  You were hospitalized due to chest pain  You were cared for on the 3rd floor by Pam Bingham PA-C under the service of Jodie Ganser, MD with the [address] [place] [address] [facility] who covers for your primary care physician (PCP), Franklin Cowden, DO, while you were hospitalized  If you have any questions or concerns related to this hospitalization, you may contact us at [phone]  For follow up as well as any medication refills, we recommend that you follow up with your primary care physician  A registered nurse will reach out to you by phone within a few days after your discharge to answer any additional questions that you may have after going home  However, at this time we provide for you here, the most important instructions / recommendations at discharge:     · Notable Medication Adjustments -   · None  · Testing Required after Discharge -   · None  · Important follow up information -   · Follow up with family doctor to discuss anxiety and follow up with cardiology  · Other Instructions -   · Stay hydrated/drink plenty of fluids  · Please review this entire after visit summary as additional general instructions including medication list, appointments, activity, diet, any pertinent wound care, and other additional recommendations from your care team that may be provided for you      Sincerely,   Pam Bingham PA-C

## 2020-07-06 ENCOUNTER — TRANSITIONAL CARE MANAGEMENT (OUTPATIENT)
Dept: FAMILY MEDICINE CLINIC | Facility: MEDICAL CENTER | Age: 55
End: 2020-07-06

## 2020-07-07 ENCOUNTER — TRANSCRIBE ORDERS (OUTPATIENT)
Dept: ADMINISTRATIVE | Facility: HOSPITAL | Age: 55
End: 2020-07-07

## 2020-07-07 DIAGNOSIS — Z12.31 ENCOUNTER FOR SCREENING MAMMOGRAM FOR MALIGNANT NEOPLASM OF BREAST: Primary | ICD-10-CM

## 2020-07-09 ENCOUNTER — OFFICE VISIT (OUTPATIENT)
Dept: FAMILY MEDICINE CLINIC | Facility: MEDICAL CENTER | Age: 55
End: 2020-07-09
Payer: COMMERCIAL

## 2020-07-09 VITALS
HEART RATE: 76 BPM | WEIGHT: 156 LBS | SYSTOLIC BLOOD PRESSURE: 144 MMHG | TEMPERATURE: 98 F | BODY MASS INDEX: 30.63 KG/M2 | DIASTOLIC BLOOD PRESSURE: 90 MMHG | HEIGHT: 60 IN | OXYGEN SATURATION: 98 %

## 2020-07-09 DIAGNOSIS — F41.9 ANXIETY: ICD-10-CM

## 2020-07-09 DIAGNOSIS — I10 ESSENTIAL HYPERTENSION: ICD-10-CM

## 2020-07-09 DIAGNOSIS — R91.1 LUNG NODULE: ICD-10-CM

## 2020-07-09 DIAGNOSIS — R07.9 CHEST PAIN, UNSPECIFIED TYPE: Primary | ICD-10-CM

## 2020-07-09 PROCEDURE — 99495 TRANSJ CARE MGMT MOD F2F 14D: CPT | Performed by: FAMILY MEDICINE

## 2020-07-09 PROCEDURE — 1111F DSCHRG MED/CURRENT MED MERGE: CPT | Performed by: FAMILY MEDICINE

## 2020-07-09 RX ORDER — DULOXETIN HYDROCHLORIDE 20 MG/1
20 CAPSULE, DELAYED RELEASE ORAL DAILY
Qty: 30 CAPSULE | Refills: 1 | Status: SHIPPED | OUTPATIENT
Start: 2020-07-09 | End: 2020-07-10

## 2020-07-09 NOTE — PROGRESS NOTES
Assessment/Plan:       Diagnoses and all orders for this visit:    Chest pain, unspecified type  Hospital records reviewed  Cardiac workup unremarkable  Chest pain felt to be secondary to stress  Anxiety  -     DULoxetine (CYMBALTA) 20 mg capsule; Take 1 capsule (20 mg total) by mouth daily  Patient has stress is likely secondary to her anxiety  She denies any depression or suicidal ideation  Anxiety is likely adding to her chest pain  I recommended medication to help improve anxiety and patient was agreeable  I recommended an SNRI and discussed potential side effects  Patient is agreeable to taking medication  I will have her start Cymbalta 20 mg daily  Patient to call the office or go to the ER if anxiety worsens, depression develops or suicidal ideation develops  Lung nodule  -     CT chest wo contrast; Future  Patient does have a known lung nodule and is due for a CT scan of the lungs which was previously ordered but patient never had performed  Patient is agreeable to having the CT scan  Order provided today  She will proceed across the hallway to help with scheduled  Essential hypertension  -     Misc  Devices MISC; Automatic blood pressure cuff to use daily as directed  Blood pressure is elevated likely secondary to stress and anxiety  Should improve with treatment  Patient was asked to check her blood pressure home but states she does not have a blood pressure cuff  I did give her a prescription for one  Follow-up in one month or sooner if needed  Subjective:      Patient ID: Clarence Benitez is a 49 y o  female  Patient presents for hospital follow-up  She states she was at work in a poorly air-conditioned area wearing her mask and started to not feel well  Proceed to the emergency room and was admitted for chest pain  Workup was unremarkable and it is believed patient's symptoms are secondary to stress    Patient states she has a difficult time wearing her mask at work  She also has a lot going on with her son who is currently in [facility] and is on heroin  Continues to have central chest pain  She does complain of anxiety  Denies any depression  Would like to start medication if possible  The following portions of the patient's history were reviewed and updated as appropriate: She  has a past medical history of Fibroids, History of ovarian cyst, Hyperlipidemia, Hypertension, Migraine, and Thyroid nodule  She   Patient Active Problem List    Diagnosis Date Noted    Anxiety 07/09/2020    Gastric erosions 07/03/2020    Left upper quadrant pain 03/04/2020    Screening for colon cancer 03/04/2020    Pain of right upper extremity 02/14/2020    Hyperlipidemia 01/30/2020    Heel spur, left 07/09/2019    Hypertension 08/32/0721    Diastolic dysfunction 78/42/5690    Second hand smoke exposure 05/08/2019    Lung nodule 05/08/2019     She  has a past surgical history that includes Endometrial biopsy; Tonsillectomy; and Tubal ligation  Her family history includes Arthritis in her maternal grandmother and mother; Breast cancer in her other; Clotting disorder in her paternal grandfather; Colon cancer (age of onset: 58) in her maternal grandfather; Diabetes in her maternal grandmother and paternal grandmother; Diverticulitis in her mother; Heart attack in her maternal grandmother and paternal grandmother; Heart disease in her brother, father, maternal grandmother, mother, paternal grandmother, and sister; Heart murmur in her brother; Hypertension in her mother and sister; Migraines in her mother; No Known Problems in her daughter, son, and son; Thyroid disease in her mother; Varicose Veins in her maternal grandmother  She  reports that she has never smoked  She has never used smokeless tobacco  She reports that she drinks alcohol  She reports that she does not use drugs    Current Outpatient Medications   Medication Sig Dispense Refill    aspirin 81 mg chewable tablet Chew 1 tablet (81 mg total) daily 90 tablet 3    atorvastatin (LIPITOR) 40 mg tablet TAKE 1 TABLET (40 MG TOTAL) BY MOUTH DAILY WITH DINNER 90 tablet 3    hydrochlorothiazide (HYDRODIURIL) 25 mg tablet TAKE 1 TABLET BY MOUTH EVERY DAY 90 tablet 3    metoprolol tartrate (LOPRESSOR) 25 mg tablet TAKE 1 TABLET (25 MG TOTAL) BY MOUTH EVERY 12 (TWELVE) HOURS 180 tablet 3    pantoprazole (PROTONIX) 40 mg tablet Take 1 tablet (40 mg total) by mouth daily 30 tablet 3    DULoxetine (CYMBALTA) 20 mg capsule Take 1 capsule (20 mg total) by mouth daily 30 capsule 1    Misc  Devices MISC Automatic blood pressure cuff to use daily as directed  1 each 0     No current facility-administered medications for this visit  Current Outpatient Medications on File Prior to Visit   Medication Sig    aspirin 81 mg chewable tablet Chew 1 tablet (81 mg total) daily    atorvastatin (LIPITOR) 40 mg tablet TAKE 1 TABLET (40 MG TOTAL) BY MOUTH DAILY WITH DINNER    hydrochlorothiazide (HYDRODIURIL) 25 mg tablet TAKE 1 TABLET BY MOUTH EVERY DAY    metoprolol tartrate (LOPRESSOR) 25 mg tablet TAKE 1 TABLET (25 MG TOTAL) BY MOUTH EVERY 12 (TWELVE) HOURS    pantoprazole (PROTONIX) 40 mg tablet Take 1 tablet (40 mg total) by mouth daily     No current facility-administered medications on file prior to visit  She has No Known Allergies       Review of Systems   Constitutional: Negative for fever  Respiratory: Negative for shortness of breath  Cardiovascular: Positive for chest pain  Psychiatric/Behavioral: Negative for dysphoric mood  The patient is nervous/anxious  Objective:      /90 (BP Location: Left arm, Patient Position: Sitting, Cuff Size: Adult)   Pulse 76   Temp 98 °F (36 7 °C)   Ht 5' (1 524 m)   Wt 70 8 kg (156 lb)   SpO2 98%   BMI 30 47 kg/m²          Physical Exam   Constitutional: She appears well-developed and well-nourished     Cardiovascular: Normal rate, regular rhythm and normal heart sounds  Pulmonary/Chest: Effort normal and breath sounds normal  No respiratory distress

## 2020-07-10 RX ORDER — DULOXETIN HYDROCHLORIDE 20 MG/1
CAPSULE, DELAYED RELEASE ORAL
Qty: 90 CAPSULE | Refills: 0 | Status: SHIPPED | OUTPATIENT
Start: 2020-07-10 | End: 2020-10-06 | Stop reason: SDUPTHER

## 2020-07-17 ENCOUNTER — HOSPITAL ENCOUNTER (OUTPATIENT)
Dept: RADIOLOGY | Facility: MEDICAL CENTER | Age: 55
Discharge: HOME | End: 2020-07-17
Payer: COMMERCIAL

## 2020-07-17 DIAGNOSIS — R91.1 LUNG NODULE: ICD-10-CM

## 2020-07-17 PROCEDURE — 71250 CT THORAX DX C-: CPT

## 2020-08-10 ENCOUNTER — OFFICE VISIT (OUTPATIENT)
Dept: URGENT CARE | Facility: MEDICAL CENTER | Age: 55
End: 2020-08-10
Payer: COMMERCIAL

## 2020-08-10 VITALS
HEART RATE: 110 BPM | HEIGHT: 60 IN | DIASTOLIC BLOOD PRESSURE: 96 MMHG | WEIGHT: 147.4 LBS | SYSTOLIC BLOOD PRESSURE: 166 MMHG | RESPIRATION RATE: 18 BRPM | TEMPERATURE: 97.4 F | OXYGEN SATURATION: 97 % | BODY MASS INDEX: 28.94 KG/M2

## 2020-08-10 DIAGNOSIS — H66.92 LEFT OTITIS MEDIA, UNSPECIFIED OTITIS MEDIA TYPE: Primary | ICD-10-CM

## 2020-08-10 PROCEDURE — G0382 LEV 3 HOSP TYPE B ED VISIT: HCPCS | Performed by: PHYSICIAN ASSISTANT

## 2020-08-10 PROCEDURE — 99283 EMERGENCY DEPT VISIT LOW MDM: CPT | Performed by: PHYSICIAN ASSISTANT

## 2020-08-10 PROCEDURE — 99213 OFFICE O/P EST LOW 20 MIN: CPT | Performed by: PHYSICIAN ASSISTANT

## 2020-08-10 RX ORDER — AMOXICILLIN 500 MG/1
500 CAPSULE ORAL EVERY 8 HOURS SCHEDULED
Qty: 21 CAPSULE | Refills: 0 | Status: SHIPPED | OUTPATIENT
Start: 2020-08-10 | End: 2020-08-17

## 2020-08-10 NOTE — LETTER
August 10, 2020     Patient: Luisa Phillips   YOB: 1965   Date of Visit: 8/10/2020       To Whom it May Concern:    Luisa Phillips was seen in my clinic on 8/10/2020  She may return to work on 8/12/2020  If you have any questions or concerns, please don't hesitate to call           Sincerely,          Hemalatha Martinez PA-C        CC: Ayah Aranda

## 2020-08-11 ENCOUNTER — TELEPHONE (OUTPATIENT)
Dept: FAMILY MEDICINE CLINIC | Facility: MEDICAL CENTER | Age: 55
End: 2020-08-11

## 2020-08-11 NOTE — TELEPHONE ENCOUNTER
[facility], given note to be out of work today and tomorrow, pt said she is just picking up the antibiotic now and [facility] will not extend her note, pt wants to know if you can extend it one day

## 2020-08-11 NOTE — PROGRESS NOTES
[facility]        NAME: Beverly Hickey is a 49 y o  female  : 1965    MRN: [id number]  DATE: August 10, 2020  TIME: 8:55 PM    Assessment and Plan   Left otitis media, unspecified otitis media type [H66 92]  1  Left otitis media, unspecified otitis media type  amoxicillin (AMOXIL) 500 mg capsule         Patient Instructions     Otitis media  Amoxicillin as directed  Follow up with PCP in 3-5 days  Proceed to  ER if symptoms worsen  Chief Complaint     Chief Complaint   Patient presents with    Earache     Bilateral ear pain x 3 days  History of Present Illness       50 y/o female c/o bilateral ear pain left worse than right  Denies fever, chills, nausea, vomiting      Review of Systems   Review of Systems   Constitutional: Negative  HENT: Positive for ear pain  Eyes: Negative  Respiratory: Negative  Negative for cough, chest tightness, shortness of breath, wheezing and stridor  Cardiovascular: Negative  Negative for chest pain, palpitations and leg swelling  Current Medications       Current Outpatient Medications:     aspirin 81 mg chewable tablet, Chew 1 tablet (81 mg total) daily, Disp: 90 tablet, Rfl: 3    atorvastatin (LIPITOR) 40 mg tablet, TAKE 1 TABLET (40 MG TOTAL) BY MOUTH DAILY WITH DINNER, Disp: 90 tablet, Rfl: 3    DULoxetine (CYMBALTA) 20 mg capsule, TAKE 1 CAPSULE BY MOUTH EVERY DAY, Disp: 90 capsule, Rfl: 0    hydrochlorothiazide (HYDRODIURIL) 25 mg tablet, TAKE 1 TABLET BY MOUTH EVERY DAY, Disp: 90 tablet, Rfl: 3    metoprolol tartrate (LOPRESSOR) 25 mg tablet, TAKE 1 TABLET (25 MG TOTAL) BY MOUTH EVERY 12 (TWELVE) HOURS, Disp: 180 tablet, Rfl: 3    Misc   Devices MISC, Automatic blood pressure cuff to use daily as directed , Disp: 1 each, Rfl: 0    pantoprazole (PROTONIX) 40 mg tablet, Take 1 tablet (40 mg total) by mouth daily, Disp: 30 tablet, Rfl: 3    amoxicillin (AMOXIL) 500 mg capsule, Take 1 capsule (500 mg total) by mouth every 8 (eight) hours for 7 days, Disp: 21 capsule, Rfl: 0    Current Allergies     Allergies as of 08/10/2020    (No Known Allergies)            The following portions of the patient's history were reviewed and updated as appropriate: allergies, current medications, past family history, past medical history, past social history, past surgical history and problem list      Past Medical History:   Diagnosis Date    Fibroids     History of ovarian cyst     Hyperlipidemia     Hypertension     Migraine     Thyroid nodule        Past Surgical History:   Procedure Laterality Date    ENDOMETRIAL BIOPSY      W/o cervical dilation  Resolved 9/30/2014      TONSILLECTOMY      Age 6      TUBAL LIGATION         Family History   Problem Relation Age of Onset    Arthritis Mother     Diverticulitis Mother     Heart disease Mother     Hypertension Mother     Migraines Mother     Thyroid disease Mother     Arthritis Maternal Grandmother     Diabetes Maternal Grandmother     Heart disease Maternal Grandmother     Varicose Veins Maternal Grandmother     Heart attack Maternal Grandmother     Colon cancer Maternal Grandfather 58    Diabetes Paternal Grandmother     Heart disease Paternal Grandmother     Heart attack Paternal Grandmother     Heart disease Father     Heart disease Sister     Hypertension Sister     No Known Problems Daughter     Clotting disorder Paternal Grandfather     Heart disease Brother     Heart murmur Brother     No Known Problems Son     No Known Problems Son     Breast cancer Other          Medications have been verified  Objective   /96   Pulse (!) 110   Temp (!) 97 4 °F (36 3 °C) (Temporal)   Resp 18   Ht 5' (1 524 m)   Wt 66 9 kg (147 lb 6 4 oz)   SpO2 97%   BMI 28 79 kg/m²        Physical Exam     Physical Exam  Vitals signs and nursing note reviewed  Constitutional:       General: She is not in acute distress  Appearance: Normal appearance   She is well-developed  She is not diaphoretic  HENT:      Head: Normocephalic and atraumatic  Right Ear: Hearing, tympanic membrane, ear canal and external ear normal       Left Ear: Hearing, ear canal and external ear normal  Tympanic membrane is erythematous and bulging  Mouth/Throat:      Pharynx: Uvula midline  Eyes:      Conjunctiva/sclera: Conjunctivae normal       Pupils: Pupils are equal, round, and reactive to light  Neck:      Musculoskeletal: Normal range of motion and neck supple  Cardiovascular:      Rate and Rhythm: Normal rate and regular rhythm  Heart sounds: Normal heart sounds  Pulmonary:      Effort: Pulmonary effort is normal  No respiratory distress  Breath sounds: Normal breath sounds  No stridor  No wheezing, rhonchi or rales  Chest:      Chest wall: No tenderness  Lymphadenopathy:      Cervical: Cervical adenopathy present  Neurological:      Mental Status: She is alert

## 2020-08-11 NOTE — TELEPHONE ENCOUNTER
Pt called again  She said she was out of work Saturday and Sunday, 8/8/20 and 8/9/20  She was seen in urgent care on Monday  They will not give her a note for the weekend  She will be penalized at work if she does not have a note for those days  She would like Dr Miles Gonzalez to give her a note for those two days    Please advise

## 2020-08-11 NOTE — PATIENT INSTRUCTIONS
Otitis media  Amoxicillin as directed  Follow up with PCP in 3-5 days  Proceed to  ER if symptoms worsen  Otitis Media   WHAT YOU NEED TO KNOW:   Otitis media is an ear infection  DISCHARGE INSTRUCTIONS:   Medicines:  · Ibuprofen or acetaminophen  helps decrease your pain and fever  They are available without a doctor's order  Ask your healthcare provider which medicine is right for you  Ask how much to take and how often to take it  These medicines can cause stomach bleeding if not taken correctly  Ibuprofen can cause kidney damage  Do not take ibuprofen if you have kidney disease, an ulcer, or allergies to aspirin  Acetaminophen can cause liver damage  Do not drink alcohol if you take acetaminophen  · Ear drops  help treat your ear pain  · Antibiotics  help treat a bacterial infection that caused your ear infection  · Take your medicine as directed  Contact your healthcare provider if you think your medicine is not helping or if you have side effects  Tell him or her if you are allergic to any medicine  Keep a list of the medicines, vitamins, and herbs you take  Include the amounts, and when and why you take them  Bring the list or the pill bottles to follow-up visits  Carry your medicine list with you in case of an emergency  Heat or ice:   · Heat  may be used to decrease your pain  Place a warm, moist washcloth on your ear  Apply for 15 to 20 minutes, 3 to 4 times a day    · Ice  helps decrease swelling and pain  Use an ice pack or put crushed ice in a plastic bag  Cover the ice pack with a towel and place it on your ear for 15 to 20 minutes, 3 to 4 times a day for 2 days  Prevent otitis media:   · Wash your hands often  Use soap and water  Wash your hands after you use the bathroom, change a child's diapers, or sneeze  Wash your hands before you prepare or eat food  · Stay away from people who are ill  Some germs are easily and quickly spread through contact  Return to work or school:   You may return to work or school when your fever is gone  Follow up with your healthcare provider as directed:  Write down your questions so you remember to ask them during your visits  Contact your healthcare provider if:   · Your ear pain gets worse or does not go away, even after treatment  · The outside of your ear is red or swollen  · You have vomiting or diarrhea  · You have fluid coming from your ear  · You have questions or concerns about your condition or care  Return to the emergency department if:   · You have a seizure  · You have a fever and a stiff neck  © 2017 [facility] Information is for End User's use only and may not be sold, redistributed or otherwise used for commercial purposes  All illustrations and images included in CareNotes® are the copyrighted property of [email or web address]  or Kenny Russo  The above information is an  only  It is not intended as medical advice for individual conditions or treatments  Talk to your doctor, nurse or pharmacist before following any medical regimen to see if it is safe and effective for you

## 2020-08-12 ENCOUNTER — OFFICE VISIT (OUTPATIENT)
Dept: FAMILY MEDICINE CLINIC | Facility: MEDICAL CENTER | Age: 55
End: 2020-08-12
Payer: COMMERCIAL

## 2020-08-12 VITALS
OXYGEN SATURATION: 98 % | HEART RATE: 55 BPM | WEIGHT: 148 LBS | DIASTOLIC BLOOD PRESSURE: 82 MMHG | TEMPERATURE: 97.3 F | BODY MASS INDEX: 29.06 KG/M2 | HEIGHT: 60 IN | SYSTOLIC BLOOD PRESSURE: 126 MMHG

## 2020-08-12 DIAGNOSIS — H92.02 LEFT EAR PAIN: Primary | ICD-10-CM

## 2020-08-12 PROCEDURE — 99213 OFFICE O/P EST LOW 20 MIN: CPT | Performed by: FAMILY MEDICINE

## 2020-08-12 PROCEDURE — 3079F DIAST BP 80-89 MM HG: CPT | Performed by: FAMILY MEDICINE

## 2020-08-12 PROCEDURE — 3008F BODY MASS INDEX DOCD: CPT | Performed by: FAMILY MEDICINE

## 2020-08-12 PROCEDURE — 3074F SYST BP LT 130 MM HG: CPT | Performed by: FAMILY MEDICINE

## 2020-08-12 PROCEDURE — 1036F TOBACCO NON-USER: CPT | Performed by: FAMILY MEDICINE

## 2020-08-12 NOTE — PROGRESS NOTES
Assessment/Plan:       Diagnoses and all orders for this visit:    Left ear pain    Urgent care records reviewed  Patient diagnosed with left otitis media  Given antibiotics  Exam today unremarkable  Ear pain improving  Complete entire course of antibiotics  Note for work will be provided  Follow-up in one week if symptoms persist or sooner if needed  Subjective:      Patient ID: Joss Arevalo is a 49 y o  female  Patient presents for follow-up from the urgent care  Seen there earlier this week and diagnosed with left otitis media  Placed on antibiotics  Patient states her ear is still a bit painful but is improving  She needs a note for work keeping her out from Saturday 8/8/2020 until today  This is so she does not lose her job  Earache          The following portions of the patient's history were reviewed and updated as appropriate: She  has a past medical history of Fibroids, History of ovarian cyst, Hyperlipidemia, Hypertension, Migraine, and Thyroid nodule  She   Patient Active Problem List    Diagnosis Date Noted    Anxiety 07/09/2020    Gastric erosions 07/03/2020    Left upper quadrant pain 03/04/2020    Screening for colon cancer 03/04/2020    Pain of right upper extremity 02/14/2020    Hyperlipidemia 01/30/2020    Heel spur, left 07/09/2019    Hypertension 30/00/2464    Diastolic dysfunction 81/12/9220    Second hand smoke exposure 05/08/2019    Lung nodule 05/08/2019     She  has a past surgical history that includes Endometrial biopsy; Tonsillectomy; and Tubal ligation    Her family history includes Arthritis in her maternal grandmother and mother; Breast cancer in her other; Clotting disorder in her paternal grandfather; Colon cancer (age of onset: 58) in her maternal grandfather; Diabetes in her maternal grandmother and paternal grandmother; Diverticulitis in her mother; Heart attack in her maternal grandmother and paternal grandmother; Heart disease in her brother, father, maternal grandmother, mother, paternal grandmother, and sister; Heart murmur in her brother; Hypertension in her mother and sister; Migraines in her mother; No Known Problems in her daughter, son, and son; Thyroid disease in her mother; Varicose Veins in her maternal grandmother  She  reports that she has never smoked  She has never used smokeless tobacco  She reports current alcohol use  She reports that she does not use drugs  Current Outpatient Medications   Medication Sig Dispense Refill    amoxicillin (AMOXIL) 500 mg capsule Take 1 capsule (500 mg total) by mouth every 8 (eight) hours for 7 days 21 capsule 0    aspirin 81 mg chewable tablet Chew 1 tablet (81 mg total) daily 90 tablet 3    atorvastatin (LIPITOR) 40 mg tablet TAKE 1 TABLET (40 MG TOTAL) BY MOUTH DAILY WITH DINNER 90 tablet 3    DULoxetine (CYMBALTA) 20 mg capsule TAKE 1 CAPSULE BY MOUTH EVERY DAY 90 capsule 0    hydrochlorothiazide (HYDRODIURIL) 25 mg tablet TAKE 1 TABLET BY MOUTH EVERY DAY 90 tablet 3    metoprolol tartrate (LOPRESSOR) 25 mg tablet TAKE 1 TABLET (25 MG TOTAL) BY MOUTH EVERY 12 (TWELVE) HOURS 180 tablet 3    Misc  Devices MISC Automatic blood pressure cuff to use daily as directed  1 each 0    pantoprazole (PROTONIX) 40 mg tablet Take 1 tablet (40 mg total) by mouth daily 30 tablet 3     No current facility-administered medications for this visit        Current Outpatient Medications on File Prior to Visit   Medication Sig    amoxicillin (AMOXIL) 500 mg capsule Take 1 capsule (500 mg total) by mouth every 8 (eight) hours for 7 days    aspirin 81 mg chewable tablet Chew 1 tablet (81 mg total) daily    atorvastatin (LIPITOR) 40 mg tablet TAKE 1 TABLET (40 MG TOTAL) BY MOUTH DAILY WITH DINNER    DULoxetine (CYMBALTA) 20 mg capsule TAKE 1 CAPSULE BY MOUTH EVERY DAY    hydrochlorothiazide (HYDRODIURIL) 25 mg tablet TAKE 1 TABLET BY MOUTH EVERY DAY    metoprolol tartrate (LOPRESSOR) 25 mg tablet TAKE 1 TABLET (25 MG TOTAL) BY MOUTH EVERY 12 (TWELVE) HOURS    Misc  Devices MISC Automatic blood pressure cuff to use daily as directed   pantoprazole (PROTONIX) 40 mg tablet Take 1 tablet (40 mg total) by mouth daily     No current facility-administered medications on file prior to visit  She has No Known Allergies       Review of Systems   Constitutional: Negative for fever  HENT: Positive for ear pain  Respiratory: Negative for shortness of breath  Cardiovascular: Negative for chest pain  Objective:      /82 (BP Location: Left arm, Patient Position: Sitting, Cuff Size: Adult)   Pulse 55   Temp (!) 97 3 °F (36 3 °C)   Ht 5' (1 524 m)   Wt 67 1 kg (148 lb)   SpO2 98%   BMI 28 90 kg/m²          Physical Exam  Constitutional:       Appearance: Normal appearance  HENT:      Right Ear: Hearing, tympanic membrane, ear canal and external ear normal       Left Ear: Hearing, tympanic membrane, ear canal and external ear normal    Neurological:      Mental Status: She is alert

## 2020-09-06 DIAGNOSIS — R10.12 LEFT UPPER QUADRANT PAIN: ICD-10-CM

## 2020-09-08 RX ORDER — PANTOPRAZOLE SODIUM 40 MG/1
TABLET, DELAYED RELEASE ORAL
Qty: 90 TABLET | Refills: 1 | Status: SHIPPED | OUTPATIENT
Start: 2020-09-08 | End: 2021-05-29 | Stop reason: SDUPTHER

## 2020-09-20 ENCOUNTER — OFFICE VISIT (OUTPATIENT)
Dept: URGENT CARE | Facility: MEDICAL CENTER | Age: 55
End: 2020-09-20
Payer: COMMERCIAL

## 2020-09-20 VITALS — WEIGHT: 140 LBS | HEIGHT: 60 IN | BODY MASS INDEX: 27.48 KG/M2

## 2020-09-20 DIAGNOSIS — R05.9 COUGH: Primary | ICD-10-CM

## 2020-09-20 LAB — S PYO AG THROAT QL: NEGATIVE

## 2020-09-20 PROCEDURE — 87880 STREP A ASSAY W/OPTIC: CPT | Performed by: PHYSICIAN ASSISTANT

## 2020-09-20 PROCEDURE — U0003 INFECTIOUS AGENT DETECTION BY NUCLEIC ACID (DNA OR RNA); SEVERE ACUTE RESPIRATORY SYNDROME CORONAVIRUS 2 (SARS-COV-2) (CORONAVIRUS DISEASE [COVID-19]), AMPLIFIED PROBE TECHNIQUE, MAKING USE OF HIGH THROUGHPUT TECHNOLOGIES AS DESCRIBED BY CMS-2020-01-R: HCPCS | Performed by: PHYSICIAN ASSISTANT

## 2020-09-20 PROCEDURE — 87070 CULTURE OTHR SPECIMN AEROBIC: CPT | Performed by: PHYSICIAN ASSISTANT

## 2020-09-20 PROCEDURE — G0382 LEV 3 HOSP TYPE B ED VISIT: HCPCS | Performed by: PHYSICIAN ASSISTANT

## 2020-09-20 NOTE — PROGRESS NOTES
[address] [facility]        NAME: Meera Corbett is a 49 y o  female  : 1965    MRN: [id number]  DATE: 2020  TIME: 9:16 AM    Assessment and Plan   Cough [R05]  1  Cough  Novel Coronavirus (COVID-19), PCR [facility] - Office Collection         Patient Instructions     Cough  covid test sent  Follow up with PCP in 3-5 days  Proceed to  ER if symptoms worsen  Chief Complaint     Chief Complaint   Patient presents with    Sore Throat     started yesterday for all these symptoms    Fever    Vomiting    Headache         History of Present Illness       48 y/o female presents c/o sore throat, fever, body aches and vomiting x 2 days  States her grandson was diagnosed with flu like symptoms  Denies chest pain, SOB, diaphoresis      Review of Systems   Review of Systems   Constitutional: Positive for fever  Negative for activity change, appetite change, chills, diaphoresis and fatigue  HENT: Positive for congestion  Negative for ear discharge, facial swelling, hearing loss, mouth sores, nosebleeds, postnasal drip, sinus pressure, sinus pain, sneezing and voice change  Eyes: Negative  Respiratory: Positive for cough  Negative for apnea, choking, chest tightness, shortness of breath, wheezing and stridor  Cardiovascular: Negative  Negative for chest pain, palpitations and leg swelling  Gastrointestinal: Positive for nausea and vomiting           Current Medications       Current Outpatient Medications:     aspirin 81 mg chewable tablet, Chew 1 tablet (81 mg total) daily, Disp: 90 tablet, Rfl: 3    atorvastatin (LIPITOR) 40 mg tablet, TAKE 1 TABLET (40 MG TOTAL) BY MOUTH DAILY WITH DINNER, Disp: 90 tablet, Rfl: 3    DULoxetine (CYMBALTA) 20 mg capsule, TAKE 1 CAPSULE BY MOUTH EVERY DAY, Disp: 90 capsule, Rfl: 0    hydrochlorothiazide (HYDRODIURIL) 25 mg tablet, TAKE 1 TABLET BY MOUTH EVERY DAY, Disp: 90 tablet, Rfl: 3    metoprolol tartrate (LOPRESSOR) 25 mg tablet, TAKE 1 TABLET (25 MG TOTAL) BY MOUTH EVERY 12 (TWELVE) HOURS, Disp: 180 tablet, Rfl: 3    Misc  Devices MISC, Automatic blood pressure cuff to use daily as directed , Disp: 1 each, Rfl: 0    pantoprazole (PROTONIX) 40 mg tablet, TAKE 1 TABLET BY MOUTH EVERY DAY, Disp: 90 tablet, Rfl: 1    Current Allergies     Allergies as of 09/20/2020    (No Known Allergies)            The following portions of the patient's history were reviewed and updated as appropriate: allergies, current medications, past family history, past medical history, past social history, past surgical history and problem list      Past Medical History:   Diagnosis Date    Fibroids     History of ovarian cyst     Hyperlipidemia     Hypertension     Migraine     Thyroid nodule        Past Surgical History:   Procedure Laterality Date    ENDOMETRIAL BIOPSY      W/o cervical dilation  Resolved 9/30/2014      TONSILLECTOMY      Age 6      TUBAL LIGATION         Family History   Problem Relation Age of Onset    Arthritis Mother     Diverticulitis Mother     Heart disease Mother     Hypertension Mother     Migraines Mother     Thyroid disease Mother     Arthritis Maternal Grandmother     Diabetes Maternal Grandmother     Heart disease Maternal Grandmother     Varicose Veins Maternal Grandmother     Heart attack Maternal Grandmother     Colon cancer Maternal Grandfather 58    Diabetes Paternal Grandmother     Heart disease Paternal Grandmother     Heart attack Paternal Grandmother     Heart disease Father     Heart disease Sister     Hypertension Sister     No Known Problems Daughter     Clotting disorder Paternal Grandfather     Heart disease Brother     Heart murmur Brother     No Known Problems Son     No Known Problems Son     Breast cancer Other          Medications have been verified          Objective   Ht 5' (1 524 m)   Wt 63 5 kg (140 lb)   BMI 27 34 kg/m²        Physical Exam     Physical Exam  Constitutional: General: She is not in acute distress  Appearance: She is well-developed  She is not diaphoretic  HENT:      Head: Normocephalic and atraumatic  Right Ear: Hearing, tympanic membrane, ear canal and external ear normal       Left Ear: Hearing, tympanic membrane, ear canal and external ear normal       Nose: Rhinorrhea present  No congestion  Mouth/Throat:      Pharynx: Uvula midline  Neck:      Musculoskeletal: Normal range of motion and neck supple  Cardiovascular:      Rate and Rhythm: Normal rate and regular rhythm  Heart sounds: Normal heart sounds  Pulmonary:      Effort: Pulmonary effort is normal  No respiratory distress  Breath sounds: Normal breath sounds  No stridor  No wheezing, rhonchi or rales  Chest:      Chest wall: No tenderness  Abdominal:      General: Bowel sounds are normal  There is no distension  Palpations: Abdomen is soft  There is no mass  Tenderness: There is no abdominal tenderness  There is no guarding or rebound  Hernia: No hernia is present  Lymphadenopathy:      Cervical: Cervical adenopathy present

## 2020-09-20 NOTE — PATIENT INSTRUCTIONS
Cough  covid test sent  Follow up with PCP in 3-5 days  Proceed to  ER if symptoms worsen  [address]    Your healthcare provider and/or public health staff have evaluated you and have determined that you do not need to remain in the hospital at this time  At this time you can be isolated at home where you will be monitored by staff from your local or state health department  You should carefully follow the prevention and isolation steps below until a healthcare provider or local or state health department says that you can return to your normal activities  Stay home except to get medical care    People who are mildly ill with COVID-19 are able to isolate at home during their illness  You should restrict activities outside your home, except for getting medical care  Do not go to work, school, or public areas  Avoid using public transportation, ride-sharing, or taxis  Separate yourself from other people and animals in your home    People: As much as possible, you should stay in a specific room and away from other people in your home  Also, you should use a separate bathroom, if available  Animals: You should restrict contact with pets and other animals while you are sick with COVID-19, just like you would around other people  Although there have not been reports of pets or other animals becoming sick with COVID-19, it is still recommended that people sick with COVID-19 limit contact with animals until more information is known about the virus  When possible, have another member of your household care for your animals while you are sick  If you are sick with COVID-19, avoid contact with your pet, including petting, snuggling, being kissed or licked, and sharing food  If you must care for your pet or be around animals while you are sick, wash your hands before and after you interact with pets and wear a facemask  See COVID-19 and Animals for more information      Call ahead before visiting your doctor    If you have a medical appointment, call the healthcare provider and tell them that you have or may have COVID-19  This will help the healthcare providers office take steps to keep other people from getting infected or exposed  Wear a facemask    You should wear a facemask when you are around other people (e g , sharing a room or vehicle) or pets and before you enter a healthcare providers office  If you are not able to wear a facemask (for example, because it causes trouble breathing), then people who live with you should not stay in the same room with you, or they should wear a facemask if they enter your room  Cover your coughs and sneezes    Cover your mouth and nose with a tissue when you cough or sneeze  Throw used tissues in a lined trash can  Immediately wash your hands with soap and water for at least 20 seconds or, if soap and water are not available, clean your hands with an alcohol-based hand  that contains at least 60% alcohol  Clean your hands often    Wash your hands often with soap and water for at least 20 seconds, especially after blowing your nose, coughing, or sneezing; going to the bathroom; and before eating or preparing food  If soap and water are not readily available, use an alcohol-based hand  with at least 60% alcohol, covering all surfaces of your hands and rubbing them together until they feel dry  Soap and water are the best option if hands are visibly dirty  Avoid touching your eyes, nose, and mouth with unwashed hands  Avoid sharing personal household items    You should not share dishes, drinking glasses, cups, eating utensils, towels, or bedding with other people or pets in your home  After using these items, they should be washed thoroughly with soap and water      Clean all high-touch surfaces everyday    High touch surfaces include counters, tabletops, doorknobs, bathroom fixtures, toilets, phones, keyboards, tablets, and bedside tables  Also, clean any surfaces that may have blood, stool, or body fluids on them  Use a household cleaning spray or wipe, according to the label instructions  Labels contain instructions for safe and effective use of the cleaning product including precautions you should take when applying the product, such as wearing gloves and making sure you have good ventilation during use of the product  Monitor your symptoms    Seek prompt medical attention if your illness is worsening (e g , difficulty breathing)  Before seeking care, call your healthcare provider and tell them that you have, or are being evaluated for, COVID-19  Put on a facemask before you enter the facility  These steps will help the healthcare providers office to keep other people in the office or waiting room from getting infected or exposed  Ask your healthcare provider to call the local or Critical access hospital health department  Persons who are placed under active monitoring or facilitated self-monitoring should follow instructions provided by their local health department or occupational health professionals, as appropriate  If you have a medical emergency and need to call 911, notify the dispatch personnel that you have, or are being evaluated for COVID-19  If possible, put on a facemask before emergency medical services arrive  Discontinuing home isolation    Patients with confirmed COVID-19 should remain under home isolation precautions until the following conditions are met:   - They have had no fever for at least 24 hours (that is one full day of no fever without the use medicine that reduces fevers)  AND  - other symptoms have improved (for example, when their cough or shortness of breath have improved)  AND  - at least 10 days have passed since their symptoms first appeared  Patients with confirmed COVID-19 should also notify close contacts (including their workplace) and ask that they self-quarantine   Currently, close contact is defined as being within 6 feet for 10 minutes or more from the period 48 hours before symptom onset to the time at which the patient went into isolation  Close contacts of patients diagnosed with COVID-19 should be instructed by the patient to self-quarantine for 14 days from the last time of their last contact with the patient       Source: [employer]

## 2020-09-22 LAB — BACTERIA THROAT CULT: NORMAL

## 2020-09-23 LAB — SARS-COV-2 RNA SPEC QL NAA+PROBE: NOT DETECTED

## 2020-09-25 ENCOUNTER — TELEPHONE (OUTPATIENT)
Dept: URGENT CARE | Facility: MEDICAL CENTER | Age: 55
End: 2020-09-25

## 2020-10-01 ENCOUNTER — TELEPHONE (OUTPATIENT)
Dept: FAMILY MEDICINE CLINIC | Facility: MEDICAL CENTER | Age: 55
End: 2020-10-01

## 2020-10-02 ENCOUNTER — APPOINTMENT (EMERGENCY)
Dept: RADIOLOGY | Facility: HOSPITAL | Age: 55
End: 2020-10-02
Payer: COMMERCIAL

## 2020-10-02 ENCOUNTER — TELEPHONE (OUTPATIENT)
Dept: FAMILY MEDICINE CLINIC | Facility: MEDICAL CENTER | Age: 55
End: 2020-10-02

## 2020-10-02 ENCOUNTER — HOSPITAL ENCOUNTER (EMERGENCY)
Facility: HOSPITAL | Age: 55
Discharge: HOME | End: 2020-10-02
Attending: EMERGENCY MEDICINE | Admitting: EMERGENCY MEDICINE
Payer: COMMERCIAL

## 2020-10-02 ENCOUNTER — TELEMEDICINE (OUTPATIENT)
Dept: FAMILY MEDICINE CLINIC | Facility: MEDICAL CENTER | Age: 55
End: 2020-10-02
Payer: COMMERCIAL

## 2020-10-02 VITALS
TEMPERATURE: 99.9 F | SYSTOLIC BLOOD PRESSURE: 178 MMHG | DIASTOLIC BLOOD PRESSURE: 123 MMHG | BODY MASS INDEX: 29.29 KG/M2 | WEIGHT: 150 LBS

## 2020-10-02 VITALS
HEART RATE: 81 BPM | RESPIRATION RATE: 18 BRPM | DIASTOLIC BLOOD PRESSURE: 83 MMHG | TEMPERATURE: 98.3 F | OXYGEN SATURATION: 98 % | SYSTOLIC BLOOD PRESSURE: 145 MMHG

## 2020-10-02 DIAGNOSIS — R06.02 SHORTNESS OF BREATH: Primary | ICD-10-CM

## 2020-10-02 DIAGNOSIS — I16.0 HYPERTENSIVE URGENCY: ICD-10-CM

## 2020-10-02 DIAGNOSIS — R50.9 FEVER, UNSPECIFIED FEVER CAUSE: ICD-10-CM

## 2020-10-02 LAB
ATRIAL RATE: 76 BPM
BASOPHILS # BLD AUTO: 0.06 THOUSANDS/ΜL (ref 0–0.1)
BASOPHILS NFR BLD AUTO: 1 % (ref 0–1)
BILIRUB UR QL STRIP: NEGATIVE
CLARITY UR: CLEAR
COLOR UR: YELLOW
D DIMER PPP FEU-MCNC: 0.35 UG/ML FEU
EOSINOPHIL # BLD AUTO: 0.17 THOUSAND/ΜL (ref 0–0.61)
EOSINOPHIL NFR BLD AUTO: 3 % (ref 0–6)
ERYTHROCYTE [DISTWIDTH] IN BLOOD BY AUTOMATED COUNT: 13.2 % (ref 11.6–15.1)
FLUAV RNA NPH QL NAA+PROBE: NORMAL
FLUBV RNA NPH QL NAA+PROBE: NORMAL
GLUCOSE UR STRIP-MCNC: NEGATIVE MG/DL
HCT VFR BLD AUTO: 40.9 % (ref 34.8–46.1)
HGB BLD-MCNC: 12.9 G/DL (ref 11.5–15.4)
HGB UR QL STRIP.AUTO: NEGATIVE
HOLD SPECIMEN: NORMAL
IMM GRANULOCYTES # BLD AUTO: 0.02 THOUSAND/UL (ref 0–0.2)
IMM GRANULOCYTES NFR BLD AUTO: 0 % (ref 0–2)
KETONES UR STRIP-MCNC: NEGATIVE MG/DL
LEUKOCYTE ESTERASE UR QL STRIP: NEGATIVE
LYMPHOCYTES # BLD AUTO: 2.55 THOUSANDS/ΜL (ref 0.6–4.47)
LYMPHOCYTES NFR BLD AUTO: 40 % (ref 14–44)
MCH RBC QN AUTO: 29.6 PG (ref 26.8–34.3)
MCHC RBC AUTO-ENTMCNC: 31.5 G/DL (ref 31.4–37.4)
MCV RBC AUTO: 94 FL (ref 82–98)
MONOCYTES # BLD AUTO: 0.37 THOUSAND/ΜL (ref 0.17–1.22)
MONOCYTES NFR BLD AUTO: 6 % (ref 4–12)
NEUTROPHILS # BLD AUTO: 3.24 THOUSANDS/ΜL (ref 1.85–7.62)
NEUTS SEG NFR BLD AUTO: 50 % (ref 43–75)
NITRITE UR QL STRIP: NEGATIVE
NRBC BLD AUTO-RTO: 0 /100 WBCS
P AXIS: 57 DEGREES
PH UR STRIP.AUTO: 5.5 [PH] (ref 4.5–8)
PLATELET # BLD AUTO: 258 THOUSANDS/UL (ref 149–390)
PMV BLD AUTO: 10.2 FL (ref 8.9–12.7)
PR INTERVAL: 166 MS
PROT UR STRIP-MCNC: NEGATIVE MG/DL
QRS AXIS: 10 DEGREES
QRSD INTERVAL: 78 MS
QT INTERVAL: 402 MS
QTC INTERVAL: 452 MS
RBC # BLD AUTO: 4.36 MILLION/UL (ref 3.81–5.12)
RSV RNA NPH QL NAA+PROBE: NORMAL
SARS-COV-2 RNA RESP QL NAA+PROBE: NEGATIVE
SP GR UR STRIP.AUTO: <=1.005 (ref 1–1.03)
T WAVE AXIS: 31 DEGREES
TROPONIN I SERPL-MCNC: <0.02 NG/ML
UROBILINOGEN UR QL STRIP.AUTO: 0.2 E.U./DL
VENTRICULAR RATE: 76 BPM
WBC # BLD AUTO: 6.41 THOUSAND/UL (ref 4.31–10.16)

## 2020-10-02 PROCEDURE — 87635 SARS-COV-2 COVID-19 AMP PRB: CPT | Performed by: EMERGENCY MEDICINE

## 2020-10-02 PROCEDURE — 81003 URINALYSIS AUTO W/O SCOPE: CPT

## 2020-10-02 PROCEDURE — 87631 RESP VIRUS 3-5 TARGETS: CPT | Performed by: EMERGENCY MEDICINE

## 2020-10-02 PROCEDURE — 36415 COLL VENOUS BLD VENIPUNCTURE: CPT | Performed by: EMERGENCY MEDICINE

## 2020-10-02 PROCEDURE — 99284 EMERGENCY DEPT VISIT MOD MDM: CPT

## 2020-10-02 PROCEDURE — 96361 HYDRATE IV INFUSION ADD-ON: CPT

## 2020-10-02 PROCEDURE — 93010 ELECTROCARDIOGRAM REPORT: CPT | Performed by: INTERNAL MEDICINE

## 2020-10-02 PROCEDURE — 99285 EMERGENCY DEPT VISIT HI MDM: CPT | Performed by: EMERGENCY MEDICINE

## 2020-10-02 PROCEDURE — 71045 X-RAY EXAM CHEST 1 VIEW: CPT

## 2020-10-02 PROCEDURE — 84484 ASSAY OF TROPONIN QUANT: CPT | Performed by: EMERGENCY MEDICINE

## 2020-10-02 PROCEDURE — 96360 HYDRATION IV INFUSION INIT: CPT

## 2020-10-02 PROCEDURE — 87040 BLOOD CULTURE FOR BACTERIA: CPT | Performed by: EMERGENCY MEDICINE

## 2020-10-02 PROCEDURE — 99213 OFFICE O/P EST LOW 20 MIN: CPT | Performed by: FAMILY MEDICINE

## 2020-10-02 PROCEDURE — 85025 COMPLETE CBC W/AUTO DIFF WBC: CPT | Performed by: EMERGENCY MEDICINE

## 2020-10-02 PROCEDURE — 85379 FIBRIN DEGRADATION QUANT: CPT | Performed by: EMERGENCY MEDICINE

## 2020-10-02 PROCEDURE — 93005 ELECTROCARDIOGRAM TRACING: CPT

## 2020-10-02 RX ORDER — ACETAMINOPHEN 325 MG/1
650 TABLET ORAL ONCE
Status: DISCONTINUED | OUTPATIENT
Start: 2020-10-02 | End: 2020-10-02 | Stop reason: HOSPADM

## 2020-10-02 RX ADMIN — SODIUM CHLORIDE 1000 ML: 0.9 INJECTION, SOLUTION INTRAVENOUS at 12:57

## 2020-10-06 ENCOUNTER — OFFICE VISIT (OUTPATIENT)
Dept: FAMILY MEDICINE CLINIC | Facility: MEDICAL CENTER | Age: 55
End: 2020-10-06
Payer: COMMERCIAL

## 2020-10-06 VITALS
HEART RATE: 80 BPM | DIASTOLIC BLOOD PRESSURE: 80 MMHG | HEIGHT: 60 IN | SYSTOLIC BLOOD PRESSURE: 130 MMHG | WEIGHT: 149 LBS | BODY MASS INDEX: 29.25 KG/M2 | TEMPERATURE: 98 F

## 2020-10-06 DIAGNOSIS — F41.9 ANXIETY: Primary | ICD-10-CM

## 2020-10-06 DIAGNOSIS — Z23 IMMUNIZATION DUE: ICD-10-CM

## 2020-10-06 DIAGNOSIS — E78.2 MIXED HYPERLIPIDEMIA: ICD-10-CM

## 2020-10-06 DIAGNOSIS — I10 ESSENTIAL HYPERTENSION: ICD-10-CM

## 2020-10-06 PROBLEM — M79.601 PAIN OF RIGHT UPPER EXTREMITY: Status: RESOLVED | Noted: 2020-02-14 | Resolved: 2020-10-06

## 2020-10-06 PROBLEM — Z77.22 SECOND HAND SMOKE EXPOSURE: Status: RESOLVED | Noted: 2019-05-08 | Resolved: 2020-10-06

## 2020-10-06 PROBLEM — Z12.11 SCREENING FOR COLON CANCER: Status: RESOLVED | Noted: 2020-03-04 | Resolved: 2020-10-06

## 2020-10-06 PROBLEM — R10.12 LEFT UPPER QUADRANT PAIN: Status: RESOLVED | Noted: 2020-03-04 | Resolved: 2020-10-06

## 2020-10-06 PROCEDURE — 90471 IMMUNIZATION ADMIN: CPT

## 2020-10-06 PROCEDURE — 3079F DIAST BP 80-89 MM HG: CPT

## 2020-10-06 PROCEDURE — 99214 OFFICE O/P EST MOD 30 MIN: CPT

## 2020-10-06 PROCEDURE — 90682 RIV4 VACC RECOMBINANT DNA IM: CPT

## 2020-10-06 PROCEDURE — 1036F TOBACCO NON-USER: CPT

## 2020-10-06 RX ORDER — DULOXETIN HYDROCHLORIDE 20 MG/1
20 CAPSULE, DELAYED RELEASE ORAL DAILY
Qty: 90 CAPSULE | Refills: 1 | Status: SHIPPED | OUTPATIENT
Start: 2020-10-06 | End: 2021-01-13 | Stop reason: SDUPTHER

## 2020-10-07 LAB
BACTERIA BLD CULT: NORMAL
BACTERIA BLD CULT: NORMAL

## 2020-12-07 ENCOUNTER — TELEPHONE (OUTPATIENT)
Dept: FAMILY MEDICINE CLINIC | Facility: MEDICAL CENTER | Age: 55
End: 2020-12-07

## 2020-12-08 ENCOUNTER — OFFICE VISIT (OUTPATIENT)
Dept: URGENT CARE | Facility: MEDICAL CENTER | Age: 55
End: 2020-12-08
Payer: COMMERCIAL

## 2020-12-08 VITALS — HEIGHT: 60 IN | BODY MASS INDEX: 27.48 KG/M2 | WEIGHT: 140 LBS

## 2020-12-08 DIAGNOSIS — H66.002 ACUTE SUPPURATIVE OTITIS MEDIA OF LEFT EAR WITHOUT SPONTANEOUS RUPTURE OF TYMPANIC MEMBRANE, RECURRENCE NOT SPECIFIED: ICD-10-CM

## 2020-12-08 DIAGNOSIS — R05.9 COUGH: Primary | ICD-10-CM

## 2020-12-08 DIAGNOSIS — R11.11 NON-INTRACTABLE VOMITING WITHOUT NAUSEA, UNSPECIFIED VOMITING TYPE: ICD-10-CM

## 2020-12-08 DIAGNOSIS — J02.9 SORE THROAT: ICD-10-CM

## 2020-12-08 PROCEDURE — 99213 OFFICE O/P EST LOW 20 MIN: CPT | Performed by: PHYSICIAN ASSISTANT

## 2020-12-08 PROCEDURE — U0003 INFECTIOUS AGENT DETECTION BY NUCLEIC ACID (DNA OR RNA); SEVERE ACUTE RESPIRATORY SYNDROME CORONAVIRUS 2 (SARS-COV-2) (CORONAVIRUS DISEASE [COVID-19]), AMPLIFIED PROBE TECHNIQUE, MAKING USE OF HIGH THROUGHPUT TECHNOLOGIES AS DESCRIBED BY CMS-2020-01-R: HCPCS | Performed by: PHYSICIAN ASSISTANT

## 2020-12-08 RX ORDER — BENZONATATE 100 MG/1
100 CAPSULE ORAL 3 TIMES DAILY PRN
Qty: 20 CAPSULE | Refills: 0 | Status: SHIPPED | OUTPATIENT
Start: 2020-12-08 | End: 2021-01-13 | Stop reason: ALTCHOICE

## 2020-12-08 RX ORDER — AMOXICILLIN 500 MG/1
500 CAPSULE ORAL EVERY 12 HOURS SCHEDULED
Qty: 14 CAPSULE | Refills: 0 | Status: SHIPPED | OUTPATIENT
Start: 2020-12-08 | End: 2020-12-14

## 2020-12-10 LAB — SARS-COV-2 RNA SPEC QL NAA+PROBE: NOT DETECTED

## 2020-12-14 ENCOUNTER — TELEMEDICINE (OUTPATIENT)
Dept: FAMILY MEDICINE CLINIC | Facility: MEDICAL CENTER | Age: 55
End: 2020-12-14
Payer: COMMERCIAL

## 2020-12-14 ENCOUNTER — TELEPHONE (OUTPATIENT)
Dept: FAMILY MEDICINE CLINIC | Facility: MEDICAL CENTER | Age: 55
End: 2020-12-14

## 2020-12-14 VITALS
TEMPERATURE: 99.9 F | WEIGHT: 140 LBS | SYSTOLIC BLOOD PRESSURE: 144 MMHG | BODY MASS INDEX: 27.48 KG/M2 | HEIGHT: 60 IN | DIASTOLIC BLOOD PRESSURE: 84 MMHG

## 2020-12-14 DIAGNOSIS — R50.9 FEBRILE ILLNESS: Primary | ICD-10-CM

## 2020-12-14 PROCEDURE — 99213 OFFICE O/P EST LOW 20 MIN: CPT | Performed by: FAMILY MEDICINE

## 2020-12-14 PROCEDURE — 3079F DIAST BP 80-89 MM HG: CPT | Performed by: FAMILY MEDICINE

## 2020-12-14 PROCEDURE — 1036F TOBACCO NON-USER: CPT | Performed by: FAMILY MEDICINE

## 2020-12-14 PROCEDURE — 87631 RESP VIRUS 3-5 TARGETS: CPT | Performed by: FAMILY MEDICINE

## 2020-12-14 PROCEDURE — U0003 INFECTIOUS AGENT DETECTION BY NUCLEIC ACID (DNA OR RNA); SEVERE ACUTE RESPIRATORY SYNDROME CORONAVIRUS 2 (SARS-COV-2) (CORONAVIRUS DISEASE [COVID-19]), AMPLIFIED PROBE TECHNIQUE, MAKING USE OF HIGH THROUGHPUT TECHNOLOGIES AS DESCRIBED BY CMS-2020-01-R: HCPCS | Performed by: FAMILY MEDICINE

## 2020-12-14 PROCEDURE — 3008F BODY MASS INDEX DOCD: CPT | Performed by: FAMILY MEDICINE

## 2020-12-14 PROCEDURE — 3077F SYST BP >= 140 MM HG: CPT | Performed by: FAMILY MEDICINE

## 2020-12-15 ENCOUNTER — TELEPHONE (OUTPATIENT)
Dept: FAMILY MEDICINE CLINIC | Facility: MEDICAL CENTER | Age: 55
End: 2020-12-15

## 2020-12-15 LAB
FLUAV RNA NPH QL NAA+PROBE: NORMAL
FLUBV RNA NPH QL NAA+PROBE: NORMAL
RSV RNA NPH QL NAA+PROBE: NORMAL
SARS-COV-2 RNA SPEC QL NAA+PROBE: NOT DETECTED

## 2020-12-18 ENCOUNTER — TELEPHONE (OUTPATIENT)
Dept: FAMILY MEDICINE CLINIC | Facility: MEDICAL CENTER | Age: 55
End: 2020-12-18

## 2020-12-18 NOTE — TELEPHONE ENCOUNTER
Pt called to make Dr Quin Wood aware that her employer will be faxing over paperwork from [employer] for him to complete and fax back for pt    Please put forms in Dr Sabino Short when they arrive and forward task to him

## 2021-01-04 NOTE — TELEPHONE ENCOUNTER
Spoke with pt to make her aware we haven't received the paperwork    She confirmed our fax number and will have them re-fax it

## 2021-01-12 NOTE — TELEPHONE ENCOUNTER
Paperwork completed by Dr Marleny Tejada, faxed to number indicated on the forms and scanned into pt's chart    Spoke with pt to make her aware

## 2021-01-13 ENCOUNTER — OFFICE VISIT (OUTPATIENT)
Dept: FAMILY MEDICINE CLINIC | Facility: MEDICAL CENTER | Age: 56
End: 2021-01-13
Payer: COMMERCIAL

## 2021-01-13 VITALS
HEART RATE: 70 BPM | DIASTOLIC BLOOD PRESSURE: 78 MMHG | HEIGHT: 60 IN | TEMPERATURE: 97.2 F | SYSTOLIC BLOOD PRESSURE: 120 MMHG | BODY MASS INDEX: 28.47 KG/M2 | WEIGHT: 145 LBS

## 2021-01-13 DIAGNOSIS — Z12.4 SCREENING FOR CERVICAL CANCER: ICD-10-CM

## 2021-01-13 DIAGNOSIS — F41.9 ANXIETY: Primary | ICD-10-CM

## 2021-01-13 DIAGNOSIS — Z12.31 VISIT FOR SCREENING MAMMOGRAM: ICD-10-CM

## 2021-01-13 DIAGNOSIS — Z00.00 PHYSICAL EXAM, ANNUAL: ICD-10-CM

## 2021-01-13 DIAGNOSIS — E78.2 MIXED HYPERLIPIDEMIA: ICD-10-CM

## 2021-01-13 DIAGNOSIS — I10 ESSENTIAL HYPERTENSION: ICD-10-CM

## 2021-01-13 DIAGNOSIS — M54.2 NECK PAIN: ICD-10-CM

## 2021-01-13 PROCEDURE — 3074F SYST BP LT 130 MM HG: CPT | Performed by: FAMILY MEDICINE

## 2021-01-13 PROCEDURE — 1036F TOBACCO NON-USER: CPT | Performed by: FAMILY MEDICINE

## 2021-01-13 PROCEDURE — 3078F DIAST BP <80 MM HG: CPT | Performed by: FAMILY MEDICINE

## 2021-01-13 PROCEDURE — 3008F BODY MASS INDEX DOCD: CPT | Performed by: FAMILY MEDICINE

## 2021-01-13 PROCEDURE — 99213 OFFICE O/P EST LOW 20 MIN: CPT | Performed by: FAMILY MEDICINE

## 2021-01-13 PROCEDURE — 99396 PREV VISIT EST AGE 40-64: CPT | Performed by: FAMILY MEDICINE

## 2021-01-13 RX ORDER — PREDNISONE 10 MG/1
40 TABLET ORAL DAILY
Qty: 20 TABLET | Refills: 0 | Status: SHIPPED | OUTPATIENT
Start: 2021-01-13 | End: 2021-01-18

## 2021-01-13 RX ORDER — CYCLOBENZAPRINE HCL 5 MG
TABLET ORAL
Qty: 20 TABLET | Refills: 0 | Status: SHIPPED | OUTPATIENT
Start: 2021-01-13 | End: 2022-08-07 | Stop reason: DRUGHIGH

## 2021-01-13 RX ORDER — DULOXETIN HYDROCHLORIDE 20 MG/1
20 CAPSULE, DELAYED RELEASE ORAL DAILY
Qty: 90 CAPSULE | Refills: 1 | Status: SHIPPED | OUTPATIENT
Start: 2021-01-13 | End: 2021-03-05

## 2021-01-13 NOTE — PROGRESS NOTES
Assessment/Plan:       Diagnoses and all orders for this visit:    Anxiety  -     DULoxetine (CYMBALTA) 20 mg capsule; Take 1 capsule (20 mg total) by mouth daily  Anxiety appears to be well controlled  Continue Cymbalta  Neck pain  -     predniSONE 10 mg tablet; Take 4 tablets (40 mg total) by mouth daily for 5 days  -     Ambulatory referral to Physical Therapy; Future  -     cyclobenzaprine (FLEXERIL) 5 mg tablet; Take 1-2 PO qHS PRN pain  Likely due to muscle spasm  Will have patient go for physical therapy  Will have her start a course of prednisone and use Flexeril at bedtime for spasm  Patient instructed not to drive, drink alcohol or operate firearms when she takes Flexeril as it will cause sedation  Unclear why she did not seek treatment earlier  I informed her I would not give her a note for missing work for those days  She can have a note for today  She should be able to return to work on her next scheduled work day  Essential hypertension  -     Comprehensive metabolic panel; Future  -     UA (URINE) with reflex to Scope  -     Microalbumin / creatinine urine ratio  Continue regular follow-up with Cardiology  Check labs  Mixed hyperlipidemia  -     Lipid panel; Future  -     LDL cholesterol, direct; Future  -     Comprehensive metabolic panel; Future  Continue regular follow-up with Cardiology  Check labs  Follow-up in six months or sooner if needed  Subjective:      Patient ID: Loni Rosen is a 48 y o  female  Patient presents for follow-up  She has anxiety  Current medication includes Cymbalta 20 mg daily  Denies depression  She states her medication is helping control her anxiety  Does need refills  She complains of neck pain  Location is the left side posterior aspect of her neck  She states the pain started on Saturday  No known injury  Due to the pain she stayed home from work on Sunday, Monday and Tuesday    She tells me she stayed in bed all day   When enquiring about why she did not seek medical care she stated she was unsure  Has tried over-the-counter medication with little relief of her symptoms  She is followed by Cardiology for hypertension and hyperlipidemia      The following portions of the patient's history were reviewed and updated as appropriate: She  has a past medical history of Fibroids, History of ovarian cyst, Hyperlipidemia, Hypertension, Migraine, and Thyroid nodule  She   Patient Active Problem List    Diagnosis Date Noted    Anxiety 07/09/2020    Gastric erosions 07/03/2020    Hyperlipidemia 01/30/2020    Heel spur, left 07/09/2019    Hypertension 27/79/2291    Diastolic dysfunction 60/20/8301    Lung nodule 05/08/2019     She  has a past surgical history that includes Endometrial biopsy; Tonsillectomy; and Tubal ligation  Her family history includes Arthritis in her maternal grandmother and mother; Breast cancer in her other; Clotting disorder in her paternal grandfather; Colon cancer (age of onset: 58) in her maternal grandfather; Diabetes in her maternal grandmother and paternal grandmother; Diverticulitis in her mother; Heart attack in her maternal grandmother and paternal grandmother; Heart disease in her brother, father, maternal grandmother, mother, paternal grandmother, and sister; Heart murmur in her brother; Hypertension in her mother and sister; Migraines in her mother; No Known Problems in her daughter, son, and son; Thyroid disease in her mother; Varicose Veins in her maternal grandmother  She  reports that she has never smoked  She has never used smokeless tobacco  She reports current alcohol use  She reports that she does not use drugs    Current Outpatient Medications   Medication Sig Dispense Refill    aspirin 81 mg chewable tablet Chew 1 tablet (81 mg total) daily 90 tablet 3    atorvastatin (LIPITOR) 40 mg tablet TAKE 1 TABLET (40 MG TOTAL) BY MOUTH DAILY WITH DINNER 90 tablet 3    DULoxetine (CYMBALTA) 20 mg capsule Take 1 capsule (20 mg total) by mouth daily 90 capsule 1    hydrochlorothiazide (HYDRODIURIL) 25 mg tablet TAKE 1 TABLET BY MOUTH EVERY DAY 90 tablet 3    metoprolol tartrate (LOPRESSOR) 25 mg tablet TAKE 1 TABLET (25 MG TOTAL) BY MOUTH EVERY 12 (TWELVE) HOURS 180 tablet 3    pantoprazole (PROTONIX) 40 mg tablet TAKE 1 TABLET BY MOUTH EVERY DAY 90 tablet 1    cyclobenzaprine (FLEXERIL) 5 mg tablet Take 1-2 PO qHS PRN pain 20 tablet 0    predniSONE 10 mg tablet Take 4 tablets (40 mg total) by mouth daily for 5 days 20 tablet 0     No current facility-administered medications for this visit  Current Outpatient Medications on File Prior to Visit   Medication Sig    aspirin 81 mg chewable tablet Chew 1 tablet (81 mg total) daily    atorvastatin (LIPITOR) 40 mg tablet TAKE 1 TABLET (40 MG TOTAL) BY MOUTH DAILY WITH DINNER    hydrochlorothiazide (HYDRODIURIL) 25 mg tablet TAKE 1 TABLET BY MOUTH EVERY DAY    metoprolol tartrate (LOPRESSOR) 25 mg tablet TAKE 1 TABLET (25 MG TOTAL) BY MOUTH EVERY 12 (TWELVE) HOURS    pantoprazole (PROTONIX) 40 mg tablet TAKE 1 TABLET BY MOUTH EVERY DAY    [DISCONTINUED] DULoxetine (CYMBALTA) 20 mg capsule Take 1 capsule (20 mg total) by mouth daily    [DISCONTINUED] benzonatate (TESSALON PERLES) 100 mg capsule Take 1 capsule (100 mg total) by mouth 3 (three) times a day as needed for cough     No current facility-administered medications on file prior to visit  She has No Known Allergies       Review of Systems   Constitutional: Negative for fever  Respiratory: Negative for shortness of breath  Cardiovascular: Negative for chest pain  Gastrointestinal: Negative for abdominal pain and blood in stool  Genitourinary: Negative for dysuria and hematuria  Musculoskeletal: Positive for neck pain  Negative for arthralgias and myalgias  Skin: Negative for rash  Neurological: Negative for headaches           Objective:      /78 (BP Location: Left arm, Patient Position: Sitting, Cuff Size: Adult)   Pulse 70   Temp (!) 97 2 °F (36 2 °C)   Ht 5' (1 524 m)   Wt 65 8 kg (145 lb)   BMI 28 32 kg/m²          Physical Exam  Constitutional:       General: She is not in acute distress  Appearance: She is not ill-appearing  Comments: No nose, mouth or throat complaints  Nose, mouth and throat not examined  Mask in place  COVID-19 pandemic  HENT:      Head: Normocephalic and atraumatic  Right Ear: Tympanic membrane, ear canal and external ear normal       Left Ear: Tympanic membrane, ear canal and external ear normal    Eyes:      General: Lids are normal       Conjunctiva/sclera: Conjunctivae normal       Pupils: Pupils are equal, round, and reactive to light  Neck:      Trachea: Trachea normal      Cardiovascular:      Rate and Rhythm: Normal rate and regular rhythm  Heart sounds: S1 normal and S2 normal  No murmur  Pulmonary:      Effort: Pulmonary effort is normal       Breath sounds: Normal breath sounds  Abdominal:      General: Bowel sounds are normal       Palpations: Abdomen is soft  Tenderness: There is no abdominal tenderness  Musculoskeletal: Normal range of motion  Skin:     General: Skin is warm and dry  Neurological:      Mental Status: She is alert and oriented to person, place, and time  Psychiatric:         Speech: Speech normal          Behavior: Behavior normal          Thought Content:  Thought content normal

## 2021-01-13 NOTE — PROGRESS NOTES
Assessment/Plan:       Diagnoses and all orders for this visit:    Physical exam, annual  -     Lipid panel; Future  -     LDL cholesterol, direct; Future  -     Comprehensive metabolic panel; Future  -     Hemoglobin A1C; Future  52-year-old female presenting for a physical exam   Check labs  BMI Counseling: Body mass index is 28 32 kg/m²  The BMI is above normal  Nutrition recommendations include decreasing overall calorie intake  Exercise recommendations include moderate aerobic physical activity for 150 minutes/week  Visit for screening mammogram  -     Mammo screening bilateral w 3d & cad; Future  Order provided for a mammogram   Patient highly encouraged to schedule an appointment  Screening for cervical cancer  -     Ambulatory referral to Gynecology; Future  Referral placed for gynecology  Patient highly encouraged to schedule appointment for a well-woman exam     Follow-up in six months or sooner if needed  Subjective:      Patient ID: Edison Vega is a 48 y o  female  Patient presents for a physical exam   She states she tries to eat healthy and stay physically active  No tobacco use  Occasional alcohol use  No drug use  Does have a gynecologist and is due for follow-up  She is overdue for her mammogram   She did have her colonoscopy earlier this year  Did have the flu vaccine  Is agreeable to labs  The following portions of the patient's history were reviewed and updated as appropriate: She  has a past medical history of Fibroids, History of ovarian cyst, Hyperlipidemia, Hypertension, Migraine, and Thyroid nodule  She   Patient Active Problem List    Diagnosis Date Noted    Anxiety 07/09/2020    Gastric erosions 07/03/2020    Hyperlipidemia 01/30/2020    Heel spur, left 07/09/2019    Hypertension 88/71/6738    Diastolic dysfunction 55/80/9594    Lung nodule 05/08/2019     She  has a past surgical history that includes Endometrial biopsy;  Tonsillectomy; and Tubal ligation  Her family history includes Arthritis in her maternal grandmother and mother; Breast cancer in her other; Clotting disorder in her paternal grandfather; Colon cancer (age of onset: 58) in her maternal grandfather; Diabetes in her maternal grandmother and paternal grandmother; Diverticulitis in her mother; Heart attack in her maternal grandmother and paternal grandmother; Heart disease in her brother, father, maternal grandmother, mother, paternal grandmother, and sister; Heart murmur in her brother; Hypertension in her mother and sister; Migraines in her mother; No Known Problems in her daughter, son, and son; Thyroid disease in her mother; Varicose Veins in her maternal grandmother  She  reports that she has never smoked  She has never used smokeless tobacco  She reports current alcohol use  She reports that she does not use drugs  Current Outpatient Medications   Medication Sig Dispense Refill    aspirin 81 mg chewable tablet Chew 1 tablet (81 mg total) daily 90 tablet 3    atorvastatin (LIPITOR) 40 mg tablet TAKE 1 TABLET (40 MG TOTAL) BY MOUTH DAILY WITH DINNER 90 tablet 3    DULoxetine (CYMBALTA) 20 mg capsule Take 1 capsule (20 mg total) by mouth daily 90 capsule 1    hydrochlorothiazide (HYDRODIURIL) 25 mg tablet TAKE 1 TABLET BY MOUTH EVERY DAY 90 tablet 3    metoprolol tartrate (LOPRESSOR) 25 mg tablet TAKE 1 TABLET (25 MG TOTAL) BY MOUTH EVERY 12 (TWELVE) HOURS 180 tablet 3    pantoprazole (PROTONIX) 40 mg tablet TAKE 1 TABLET BY MOUTH EVERY DAY 90 tablet 1    cyclobenzaprine (FLEXERIL) 5 mg tablet Take 1-2 PO qHS PRN pain 20 tablet 0    predniSONE 10 mg tablet Take 4 tablets (40 mg total) by mouth daily for 5 days 20 tablet 0     No current facility-administered medications for this visit        Current Outpatient Medications on File Prior to Visit   Medication Sig    aspirin 81 mg chewable tablet Chew 1 tablet (81 mg total) daily    atorvastatin (LIPITOR) 40 mg tablet TAKE 1 TABLET (40 MG TOTAL) BY MOUTH DAILY WITH DINNER    hydrochlorothiazide (HYDRODIURIL) 25 mg tablet TAKE 1 TABLET BY MOUTH EVERY DAY    metoprolol tartrate (LOPRESSOR) 25 mg tablet TAKE 1 TABLET (25 MG TOTAL) BY MOUTH EVERY 12 (TWELVE) HOURS    pantoprazole (PROTONIX) 40 mg tablet TAKE 1 TABLET BY MOUTH EVERY DAY    [DISCONTINUED] DULoxetine (CYMBALTA) 20 mg capsule Take 1 capsule (20 mg total) by mouth daily    [DISCONTINUED] benzonatate (TESSALON PERLES) 100 mg capsule Take 1 capsule (100 mg total) by mouth 3 (three) times a day as needed for cough     No current facility-administered medications on file prior to visit  She has No Known Allergies       Review of Systems   Constitutional: Negative for fever  Respiratory: Negative for shortness of breath  Cardiovascular: Negative for chest pain  Gastrointestinal: Negative for abdominal pain and blood in stool  Genitourinary: Negative for dysuria and hematuria  Musculoskeletal: Positive for neck pain  Negative for arthralgias and myalgias  Skin: Negative for rash  Neurological: Negative for headaches  Objective:      /78 (BP Location: Left arm, Patient Position: Sitting, Cuff Size: Adult)   Pulse 70   Temp (!) 97 2 °F (36 2 °C)   Ht 5' (1 524 m)   Wt 65 8 kg (145 lb)   BMI 28 32 kg/m²          Physical Exam  Constitutional:       General: She is not in acute distress  Appearance: She is not ill-appearing  Comments: No nose, mouth or throat complaints  Nose, mouth and throat not examined  Mask in place  COVID-19 pandemic  HENT:      Head: Normocephalic and atraumatic  Right Ear: Tympanic membrane, ear canal and external ear normal       Left Ear: Tympanic membrane, ear canal and external ear normal    Eyes:      General: Lids are normal       Conjunctiva/sclera: Conjunctivae normal       Pupils: Pupils are equal, round, and reactive to light     Neck:      Trachea: Trachea normal      Cardiovascular: Rate and Rhythm: Normal rate and regular rhythm  Heart sounds: S1 normal and S2 normal  No murmur  Pulmonary:      Effort: Pulmonary effort is normal       Breath sounds: Normal breath sounds  Abdominal:      General: Bowel sounds are normal       Palpations: Abdomen is soft  Tenderness: There is no abdominal tenderness  Musculoskeletal: Normal range of motion  Skin:     General: Skin is warm and dry  Neurological:      Mental Status: She is alert and oriented to person, place, and time  Psychiatric:         Speech: Speech normal          Behavior: Behavior normal          Thought Content:  Thought content normal

## 2021-03-04 ENCOUNTER — OFFICE VISIT (OUTPATIENT)
Dept: URGENT CARE | Facility: MEDICAL CENTER | Age: 56
End: 2021-03-04
Payer: COMMERCIAL

## 2021-03-04 VITALS
SYSTOLIC BLOOD PRESSURE: 155 MMHG | TEMPERATURE: 98.1 F | RESPIRATION RATE: 18 BRPM | OXYGEN SATURATION: 96 % | HEIGHT: 60 IN | DIASTOLIC BLOOD PRESSURE: 74 MMHG | WEIGHT: 151 LBS | BODY MASS INDEX: 29.64 KG/M2 | HEART RATE: 70 BPM

## 2021-03-04 DIAGNOSIS — L24.9 IRRITANT CONTACT DERMATITIS, UNSPECIFIED TRIGGER: Primary | ICD-10-CM

## 2021-03-04 PROCEDURE — 99213 OFFICE O/P EST LOW 20 MIN: CPT | Performed by: PHYSICIAN ASSISTANT

## 2021-03-04 RX ORDER — PREDNISONE 20 MG/1
20 TABLET ORAL 2 TIMES DAILY WITH MEALS
Qty: 10 TABLET | Refills: 0 | Status: SHIPPED | OUTPATIENT
Start: 2021-03-04 | End: 2021-03-09

## 2021-03-04 NOTE — PATIENT INSTRUCTIONS
1  Take Prednisone 20mg  Twice daily x 5 days  2  Take Benadryl as needed for itching  3  Cool compresses to skin as needed for comfort  4   Follow up with PCP in 3-5 days if symptoms persist

## 2021-03-04 NOTE — PROGRESS NOTES
[facility]        NAME: Ruben Steward is a 48 y o  female  : 1965    MRN: [id number]  DATE: 2021  TIME: 4:41 PM    Assessment and Plan   Irritant contact dermatitis, unspecified trigger [L24 9]  1  Irritant contact dermatitis, unspecified trigger  predniSONE 20 mg tablet         Patient Instructions     1  Take Prednisone 20mg  Twice daily x 5 days  2  Take Benadryl as needed for itching  3  Cool compresses to skin as needed for comfort  4  Follow up with PCP in 3-5 days if symptoms persist        Chief Complaint     Chief Complaint   Patient presents with    Rash     painful red rash all over body          History of Present Illness       [facility]   Is a 20-year-old female presents with an itchy rash on her skin that developed over the past 4 days  Patient denies use of any new soaps, lotions, detergents or fabric softeners  She denies  Any new foods or medications prior to the onset of her symptoms  Patient reports she has been using topical Neosporin her skin but has had no improvement of symptoms, she has had no fever or systemic symptoms  Review of Systems   Review of Systems   Constitutional: Negative  HENT: Negative  Respiratory: Negative  Gastrointestinal: Negative  Skin: Positive for rash           Current Medications       Current Outpatient Medications:     aspirin 81 mg chewable tablet, Chew 1 tablet (81 mg total) daily, Disp: 90 tablet, Rfl: 3    atorvastatin (LIPITOR) 40 mg tablet, TAKE 1 TABLET (40 MG TOTAL) BY MOUTH DAILY WITH DINNER, Disp: 90 tablet, Rfl: 3    cyclobenzaprine (FLEXERIL) 5 mg tablet, Take 1-2 PO qHS PRN pain, Disp: 20 tablet, Rfl: 0    DULoxetine (CYMBALTA) 20 mg capsule, Take 1 capsule (20 mg total) by mouth daily, Disp: 90 capsule, Rfl: 1    hydrochlorothiazide (HYDRODIURIL) 25 mg tablet, TAKE 1 TABLET BY MOUTH EVERY DAY, Disp: 90 tablet, Rfl: 3    metoprolol tartrate (LOPRESSOR) 25 mg tablet, TAKE 1 TABLET (25 MG TOTAL) BY MOUTH EVERY 12 (TWELVE) HOURS, Disp: 180 tablet, Rfl: 3    pantoprazole (PROTONIX) 40 mg tablet, TAKE 1 TABLET BY MOUTH EVERY DAY, Disp: 90 tablet, Rfl: 1    predniSONE 20 mg tablet, Take 1 tablet (20 mg total) by mouth 2 (two) times a day with meals for 5 days, Disp: 10 tablet, Rfl: 0    Current Allergies     Allergies as of 03/04/2021    (No Known Allergies)            The following portions of the patient's history were reviewed and updated as appropriate: allergies, current medications, past family history, past medical history, past social history, past surgical history and problem list      Past Medical History:   Diagnosis Date    Fibroids     History of ovarian cyst     Hyperlipidemia     Hypertension     Migraine     Thyroid nodule        Past Surgical History:   Procedure Laterality Date    ENDOMETRIAL BIOPSY      W/o cervical dilation  Resolved 9/30/2014      TONSILLECTOMY      Age 6      TUBAL LIGATION         Family History   Problem Relation Age of Onset    Arthritis Mother     Diverticulitis Mother     Heart disease Mother     Hypertension Mother     Migraines Mother     Thyroid disease Mother     Arthritis Maternal Grandmother     Diabetes Maternal Grandmother     Heart disease Maternal Grandmother     Varicose Veins Maternal Grandmother     Heart attack Maternal Grandmother     Colon cancer Maternal Grandfather 58    Diabetes Paternal Grandmother     Heart disease Paternal Grandmother     Heart attack Paternal Grandmother     Heart disease Father     Heart disease Sister     Hypertension Sister     No Known Problems Daughter     Clotting disorder Paternal Grandfather     Heart disease Brother     Heart murmur Brother     No Known Problems Son     No Known Problems Son     Breast cancer Other          Medications have been verified          Objective   /74   Pulse 70   Temp 98 1 °F (36 7 °C)   Resp 18   Ht 5' (1 524 m)   Wt 68 5 kg (151 lb)   SpO2 96%   BMI 29 49 kg/m²   No LMP recorded  Patient is postmenopausal        Physical Exam     Physical Exam  Constitutional:       General: She is not in acute distress  Appearance: Normal appearance  She is not ill-appearing  HENT:      Head: Normocephalic and atraumatic  Right Ear: Tympanic membrane and ear canal normal       Left Ear: Tympanic membrane and ear canal normal       Nose: Nose normal       Mouth/Throat:      Lips: Pink  Pharynx: Oropharynx is clear  Cardiovascular:      Rate and Rhythm: Normal rate and regular rhythm  Heart sounds: Normal heart sounds  No murmur  Pulmonary:      Effort: Pulmonary effort is normal       Breath sounds: Normal breath sounds  Skin:         Neurological:      Mental Status: She is alert

## 2021-03-05 DIAGNOSIS — F41.9 ANXIETY: ICD-10-CM

## 2021-03-05 RX ORDER — DULOXETIN HYDROCHLORIDE 20 MG/1
CAPSULE, DELAYED RELEASE ORAL
Qty: 90 CAPSULE | Refills: 0 | Status: SHIPPED | OUTPATIENT
Start: 2021-03-05 | End: 2021-05-29 | Stop reason: SDUPTHER

## 2021-03-17 DIAGNOSIS — E78.00 ELEVATED CHOLESTEROL: ICD-10-CM

## 2021-03-17 DIAGNOSIS — I10 ESSENTIAL HYPERTENSION: ICD-10-CM

## 2021-03-18 RX ORDER — ASPIRIN 81 MG/1
TABLET, CHEWABLE ORAL
Qty: 90 TABLET | Refills: 3 | Status: SHIPPED | OUTPATIENT
Start: 2021-03-18 | End: 2021-05-29 | Stop reason: SDUPTHER

## 2021-05-19 ENCOUNTER — OFFICE VISIT (OUTPATIENT)
Dept: URGENT CARE | Facility: MEDICAL CENTER | Age: 56
End: 2021-05-19
Payer: COMMERCIAL

## 2021-05-19 VITALS
RESPIRATION RATE: 18 BRPM | BODY MASS INDEX: 29.84 KG/M2 | SYSTOLIC BLOOD PRESSURE: 143 MMHG | HEIGHT: 60 IN | OXYGEN SATURATION: 96 % | WEIGHT: 152 LBS | HEART RATE: 78 BPM | DIASTOLIC BLOOD PRESSURE: 63 MMHG | TEMPERATURE: 97.7 F

## 2021-05-19 DIAGNOSIS — H10.31 ACUTE CONJUNCTIVITIS OF RIGHT EYE, UNSPECIFIED ACUTE CONJUNCTIVITIS TYPE: Primary | ICD-10-CM

## 2021-05-19 PROCEDURE — 99213 OFFICE O/P EST LOW 20 MIN: CPT | Performed by: PHYSICIAN ASSISTANT

## 2021-05-19 RX ORDER — OFLOXACIN 3 MG/ML
1 SOLUTION/ DROPS OPHTHALMIC 4 TIMES DAILY
Qty: 5 ML | Refills: 0 | Status: SHIPPED | OUTPATIENT
Start: 2021-05-19 | End: 2021-05-26

## 2021-05-19 NOTE — PROGRESS NOTES
[facility]        NAME: Alejandra Lema is a 48 y o  female  : 1965    MRN: [id number]  DATE: May 19, 2021  TIME: 7:45 PM    Assessment and Plan   Acute conjunctivitis of right eye, unspecified acute conjunctivitis type [H10 31]  1  Acute conjunctivitis of right eye, unspecified acute conjunctivitis type  ofloxacin (OCUFLOX) 0 3 % ophthalmic solution         Patient Instructions     Conjunctivitis  Ocuflox as directed  Follow up with PCP in 3-5 days  Proceed to  ER if symptoms worsen  Chief Complaint     Chief Complaint   Patient presents with    Eye Problem     right eye red and irritated          History of Present Illness       49 y/o female presents c/o red, crusted right eye x 2 days  Denies trauma, eye pain, visual disturbances      Review of Systems   Review of Systems   Constitutional: Negative  HENT: Negative  Eyes: Positive for discharge and redness  Negative for photophobia, pain, itching and visual disturbance  Respiratory: Negative  Negative for cough, chest tightness, shortness of breath, wheezing and stridor  Cardiovascular: Negative  Negative for chest pain, palpitations and leg swelling           Current Medications       Current Outpatient Medications:     aspirin 81 mg chewable tablet, CHEW 1 TABLET BY MOUTH DAILY, Disp: 90 tablet, Rfl: 3    atorvastatin (LIPITOR) 40 mg tablet, TAKE 1 TABLET (40 MG TOTAL) BY MOUTH DAILY WITH DINNER, Disp: 90 tablet, Rfl: 3    cyclobenzaprine (FLEXERIL) 5 mg tablet, Take 1-2 PO qHS PRN pain, Disp: 20 tablet, Rfl: 0    DULoxetine (CYMBALTA) 20 mg capsule, TAKE 1 CAPSULE BY MOUTH EVERY DAY, Disp: 90 capsule, Rfl: 0    hydrochlorothiazide (HYDRODIURIL) 25 mg tablet, TAKE 1 TABLET BY MOUTH EVERY DAY, Disp: 90 tablet, Rfl: 3    metoprolol tartrate (LOPRESSOR) 25 mg tablet, TAKE 1 TABLET (25 MG TOTAL) BY MOUTH EVERY 12 (TWELVE) HOURS, Disp: 180 tablet, Rfl: 3    pantoprazole (PROTONIX) 40 mg tablet, TAKE 1 TABLET BY MOUTH EVERY DAY, Disp: 90 tablet, Rfl: 1    ofloxacin (OCUFLOX) 0 3 % ophthalmic solution, Administer 1 drop into the left eye 4 (four) times a day for 7 days, Disp: 5 mL, Rfl: 0    Current Allergies     Allergies as of 05/19/2021    (No Known Allergies)            The following portions of the patient's history were reviewed and updated as appropriate: allergies, current medications, past family history, past medical history, past social history, past surgical history and problem list      Past Medical History:   Diagnosis Date    Fibroids     History of ovarian cyst     Hyperlipidemia     Hypertension     Migraine     Thyroid nodule        Past Surgical History:   Procedure Laterality Date    ENDOMETRIAL BIOPSY      W/o cervical dilation  Resolved 9/30/2014      TONSILLECTOMY      Age 6      TUBAL LIGATION         Family History   Problem Relation Age of Onset    Arthritis Mother     Diverticulitis Mother     Heart disease Mother     Hypertension Mother     Migraines Mother     Thyroid disease Mother     Arthritis Maternal Grandmother     Diabetes Maternal Grandmother     Heart disease Maternal Grandmother     Varicose Veins Maternal Grandmother     Heart attack Maternal Grandmother     Colon cancer Maternal Grandfather 58    Diabetes Paternal Grandmother     Heart disease Paternal Grandmother     Heart attack Paternal Grandmother     Heart disease Father     Heart disease Sister     Hypertension Sister     No Known Problems Daughter     Clotting disorder Paternal Grandfather     Heart disease Brother     Heart murmur Brother     No Known Problems Son     No Known Problems Son     Breast cancer Other          Medications have been verified          Objective   /63   Pulse 78   Temp 97 7 °F (36 5 °C)   Resp 18   Ht 5' (1 524 m)   Wt 68 9 kg (152 lb)   SpO2 96%   BMI 29 69 kg/m²        Physical Exam     Physical Exam  Constitutional:       General: She is not in acute distress  Appearance: She is well-developed  She is not diaphoretic  HENT:      Head: Normocephalic and atraumatic  Right Ear: Hearing, tympanic membrane, ear canal and external ear normal       Left Ear: Hearing, tympanic membrane, ear canal and external ear normal       Mouth/Throat:      Pharynx: Uvula midline  Eyes:      General:         Right eye: Discharge present  No foreign body or hordeolum  Left eye: No foreign body, discharge or hordeolum  Extraocular Movements: Extraocular movements intact  Conjunctiva/sclera:      Right eye: Right conjunctiva is injected  No chemosis, exudate or hemorrhage  Pupils: Pupils are equal, round, and reactive to light  Neck:      Musculoskeletal: Normal range of motion and neck supple  Cardiovascular:      Rate and Rhythm: Normal rate and regular rhythm  Heart sounds: Normal heart sounds  Pulmonary:      Effort: Pulmonary effort is normal       Breath sounds: Normal breath sounds  Lymphadenopathy:      Cervical: No cervical adenopathy

## 2021-05-19 NOTE — PATIENT INSTRUCTIONS
Conjunctivitis  Ocuflox as directed  Follow up with PCP in 3-5 days  Proceed to  ER if symptoms worsen  Conjunctivitis   WHAT YOU SHOULD KNOW:   Conjunctivitis, or pink eye, is inflammation of your conjunctiva  The conjunctiva is a thin tissue that covers the front of your eye and the back of your eyelids  The conjunctiva helps protect your eye and keep it moist         INSTRUCTIONS:   Medicines:   · Allergy medicine: This medicine helps decrease itchy, red, swollen eyes caused by allergies  It may be given as a pill, eye drops, or nasal spray  · Antibiotics:  You will need antibiotics if your conjunctivitis is caused by bacteria  This medicine may be given as eye drops or eye ointment  · Steroid medicine: This medicine helps decrease inflammation  It may be given as a pill, eye drops, or nasal spray  · Take your medicine as directed  Call your healthcare provider if you think your medicine is not helping or if you have side effects  Tell him if you are allergic to any medicine  Keep a list of the medicines, vitamins, and herbs you take  Include the amounts, and when and why you take them  Bring the list or the pill bottles to follow-up visits  Carry your medicine list with you in case of an emergency  Follow up with your primary healthcare provider as directed: You may need to return for more tests on your eyes  These will help your primary healthcare provider check for eye damage  Write down your questions so you remember to ask them during your visits  Avoid the spread of conjunctivitis:   · Wash your hands often:  Wash your hands before you touch your eyes  Also wash your hands before you prepare or eat food and after you use the bathroom or change a diaper  · Avoid allergens:  Try to avoid the things that cause your allergies, such as pets, dust, or grass  · Avoid contact:  Do not share towels or washcloths  Try to stay away from others as much as possible   Ask when you can return to work or school  · Throw away eye makeup:  Throw away mascara and other eye makeup  Manage your symptoms:  · Apply a cool compress:  Wet a washcloth with cold water and place it on your eye  This will help decrease swelling  · Use eye drops:  Eye drops, or artificial tears, can be bought without a doctor's order  They help keep your eye moist     · Do not wear contact lenses: They can irritate your eye  Throw away the pair you are using and ask when you can wear them again  Use a new pair of lenses when your primary healthcare provider says it is okay  · Flush your eye:  You may need to flush your eye with saline to help decrease your symptoms  Ask for more information on how to flush your eye  Contact your primary healthcare provider if:   · Your eyesight becomes blurry  · You have tiny bumps or spots of blood on your eye  · You have questions or concerns about your condition or care  Return to the emergency department if:   · The swelling in your eye gets worse, even after treatment  · Your vision suddenly becomes worse or you cannot see at all  · Your eye begins to bleed  © 2014 [address] is for End User's use only and may not be sold, redistributed or otherwise used for commercial purposes  All illustrations and images included in CareNotes® are the copyrighted property of [employer]  or Kenny Russo  The above information is an  only  It is not intended as medical advice for individual conditions or treatments  Talk to your doctor, nurse or pharmacist before following any medical regimen to see if it is safe and effective for you

## 2021-05-26 DIAGNOSIS — I16.0 HYPERTENSIVE URGENCY: ICD-10-CM

## 2021-05-26 RX ORDER — HYDROCHLOROTHIAZIDE 25 MG/1
TABLET ORAL
Qty: 90 TABLET | Refills: 3 | Status: SHIPPED | OUTPATIENT
Start: 2021-05-26 | End: 2021-05-29 | Stop reason: SDUPTHER

## 2021-05-29 DIAGNOSIS — F41.9 ANXIETY: ICD-10-CM

## 2021-05-29 DIAGNOSIS — I16.0 HYPERTENSIVE URGENCY: ICD-10-CM

## 2021-05-29 DIAGNOSIS — R07.9 CHEST PAIN, UNSPECIFIED TYPE: ICD-10-CM

## 2021-05-29 DIAGNOSIS — R10.12 LEFT UPPER QUADRANT PAIN: ICD-10-CM

## 2021-05-29 DIAGNOSIS — I10 ESSENTIAL HYPERTENSION: ICD-10-CM

## 2021-05-29 DIAGNOSIS — E78.00 ELEVATED CHOLESTEROL: ICD-10-CM

## 2021-06-01 RX ORDER — DULOXETIN HYDROCHLORIDE 20 MG/1
20 CAPSULE, DELAYED RELEASE ORAL DAILY
Qty: 30 CAPSULE | Refills: 1 | Status: SHIPPED | OUTPATIENT
Start: 2021-06-01

## 2021-06-01 RX ORDER — HYDROCHLOROTHIAZIDE 25 MG/1
25 TABLET ORAL DAILY
Qty: 90 TABLET | Refills: 0 | Status: SHIPPED | OUTPATIENT
Start: 2021-06-01 | End: 2021-08-05 | Stop reason: SDUPTHER

## 2021-06-01 RX ORDER — PANTOPRAZOLE SODIUM 40 MG/1
40 TABLET, DELAYED RELEASE ORAL DAILY
Qty: 90 TABLET | Refills: 0 | Status: SHIPPED | OUTPATIENT
Start: 2021-06-01 | End: 2021-12-05 | Stop reason: SDUPTHER

## 2021-06-01 RX ORDER — ATORVASTATIN CALCIUM 40 MG/1
40 TABLET, FILM COATED ORAL
Qty: 90 TABLET | Refills: 0 | Status: SHIPPED | OUTPATIENT
Start: 2021-06-01 | End: 2021-08-05 | Stop reason: SDUPTHER

## 2021-06-01 RX ORDER — ASPIRIN 81 MG/1
81 TABLET, CHEWABLE ORAL DAILY
Qty: 90 TABLET | Refills: 0 | Status: SHIPPED | OUTPATIENT
Start: 2021-06-01 | End: 2021-08-05 | Stop reason: SDUPTHER

## 2021-06-01 NOTE — TELEPHONE ENCOUNTER
I did refill for 90 days but as she has not been seen in over a year I am not certain if Dr Scotty Jennings would like her to schedule a yearly visit, if so, please arrange

## 2021-06-02 ENCOUNTER — TELEPHONE (OUTPATIENT)
Dept: GASTROENTEROLOGY | Facility: AMBULARY SURGERY CENTER | Age: 56
End: 2021-06-02

## 2021-07-15 ENCOUNTER — OFFICE VISIT (OUTPATIENT)
Dept: URGENT CARE | Facility: MEDICAL CENTER | Age: 56
End: 2021-07-15
Payer: COMMERCIAL

## 2021-07-15 VITALS
TEMPERATURE: 98.7 F | WEIGHT: 144 LBS | DIASTOLIC BLOOD PRESSURE: 79 MMHG | BODY MASS INDEX: 28.27 KG/M2 | SYSTOLIC BLOOD PRESSURE: 143 MMHG | HEIGHT: 60 IN | HEART RATE: 70 BPM | OXYGEN SATURATION: 97 % | RESPIRATION RATE: 16 BRPM

## 2021-07-15 DIAGNOSIS — R10.12 LEFT UPPER QUADRANT ABDOMINAL PAIN: ICD-10-CM

## 2021-07-15 DIAGNOSIS — B34.9 ACUTE VIRAL SYNDROME: Primary | ICD-10-CM

## 2021-07-15 PROCEDURE — 99213 OFFICE O/P EST LOW 20 MIN: CPT | Performed by: PHYSICIAN ASSISTANT

## 2021-07-15 PROCEDURE — U0005 INFEC AGEN DETEC AMPLI PROBE: HCPCS | Performed by: PHYSICIAN ASSISTANT

## 2021-07-15 PROCEDURE — U0003 INFECTIOUS AGENT DETECTION BY NUCLEIC ACID (DNA OR RNA); SEVERE ACUTE RESPIRATORY SYNDROME CORONAVIRUS 2 (SARS-COV-2) (CORONAVIRUS DISEASE [COVID-19]), AMPLIFIED PROBE TECHNIQUE, MAKING USE OF HIGH THROUGHPUT TECHNOLOGIES AS DESCRIBED BY CMS-2020-01-R: HCPCS | Performed by: PHYSICIAN ASSISTANT

## 2021-07-15 NOTE — LETTER
July 15, 2021     Patient: Callie Brady   YOB: 1965   Date of Visit: 7/15/2021       To Whom It May Concern: It is my medical opinion that Callie Brady may return to work on 7/19/21  Pending neg  COVID test results  If you have any questions or concerns, please don't hesitate to call           Sincerely,        Heidi Ayala PA-C    CC: Jamshid Diaz

## 2021-07-15 NOTE — PROGRESS NOTES
[facility]        NAME: Carson Garcia is a 48 y o  female  : 1965    MRN: [id number]  DATE: July 15, 2021  TIME: 5:35 PM    Assessment and Plan   Acute viral syndrome [B34 9]  1  Acute viral syndrome  Novel Coron[facility]Covid-19),[facility]   2  Left upper quadrant abdominal pain           Patient Instructions     1  Increase fluids  2  Motrin as needed for pain  3  Quarantine until test results available  4  Follow-up with PCP if symptoms persist    Chief Complaint     Chief Complaint   Patient presents with    Abdominal Pain     left upper quad sore/painful x 2 days, worse when picking heavy items up, needs work note         History of Present Illness         Connor Gillespie is a 56-year-old female presents with a 3 day history of generalized malaise, fever, chills, body aches, nausea vomiting and abdominal pain  Patient reports her symptoms started initially with a low-grade fever followed by chills, body aches and abdominal discomfort  She had multiple episodes of vomiting but denies any diarrhea since beginning of her symptoms  Patient denies any pain or burning with urination since the onset of her symptoms  Patient reports no vomiting over the past 24 hours but does have left-sided abdominal discomfort with activity  Patient is not vaccinated for COVID-19  Review of Systems   Review of Systems   Constitutional: Positive for chills, fatigue and fever  HENT: Negative  Respiratory: Negative  Gastrointestinal: Positive for abdominal pain and vomiting           Current Medications       Current Outpatient Medications:     aspirin 81 mg chewable tablet, Chew 1 tablet (81 mg total) daily, Disp: 90 tablet, Rfl: 0    atorvastatin (LIPITOR) 40 mg tablet, Take 1 tablet (40 mg total) by mouth daily with dinner, Disp: 90 tablet, Rfl: 0    DULoxetine (CYMBALTA) 20 mg capsule, Take 1 capsule (20 mg total) by mouth daily, Disp: 30 capsule, Rfl: 1   hydrochlorothiazide (HYDRODIURIL) 25 mg tablet, Take 1 tablet (25 mg total) by mouth daily, Disp: 90 tablet, Rfl: 0    metoprolol tartrate (LOPRESSOR) 25 mg tablet, Take 1 tablet (25 mg total) by mouth every 12 (twelve) hours, Disp: 180 tablet, Rfl: 0    pantoprazole (PROTONIX) 40 mg tablet, Take 1 tablet (40 mg total) by mouth daily, Disp: 90 tablet, Rfl: 0    cyclobenzaprine (FLEXERIL) 5 mg tablet, Take 1-2 PO qHS PRN pain (Patient not taking: Reported on 7/15/2021), Disp: 20 tablet, Rfl: 0    Current Allergies     Allergies as of 07/15/2021    (No Known Allergies)            The following portions of the patient's history were reviewed and updated as appropriate: allergies, current medications, past family history, past medical history, past social history, past surgical history and problem list      Past Medical History:   Diagnosis Date    Fibroids     History of ovarian cyst     Hyperlipidemia     Hypertension     Migraine     Thyroid nodule        Past Surgical History:   Procedure Laterality Date    ENDOMETRIAL BIOPSY      W/o cervical dilation   Resolved 9/30/2014      TONSILLECTOMY      Age 6      TUBAL LIGATION         Family History   Problem Relation Age of Onset    Arthritis Mother     Diverticulitis Mother     Heart disease Mother     Hypertension Mother     Migraines Mother     Thyroid disease Mother     Arthritis Maternal Grandmother     Diabetes Maternal Grandmother     Heart disease Maternal Grandmother     Varicose Veins Maternal Grandmother     Heart attack Maternal Grandmother     Colon cancer Maternal Grandfather 58    Diabetes Paternal Grandmother     Heart disease Paternal Grandmother     Heart attack Paternal Grandmother     Heart disease Father     Heart disease Sister     Hypertension Sister     No Known Problems Daughter     Clotting disorder Paternal Grandfather     Heart disease Brother     Heart murmur Brother     No Known Problems Son     No Known Problems Son    [facility] Breast cancer Other          Medications have been verified  Objective   /79   Pulse 70   Temp 98 7 °F (37 1 °C)   Resp 16   Ht 5' (1 524 m)   Wt 65 3 kg (144 lb)   SpO2 97%   BMI 28 12 kg/m²   No LMP recorded  Patient is postmenopausal        Physical Exam     Physical Exam  Constitutional:       General: She is not in acute distress  Appearance: She is well-developed  HENT:      Head: Normocephalic and atraumatic  Right Ear: Tympanic membrane and ear canal normal       Left Ear: Tympanic membrane and ear canal normal       Nose: Nose normal       Mouth/Throat:      Lips: Pink  Pharynx: Oropharynx is clear  Cardiovascular:      Rate and Rhythm: Normal rate and regular rhythm  Heart sounds: No murmur heard  Pulmonary:      Effort: Pulmonary effort is normal       Breath sounds: Normal breath sounds  Abdominal:      General: Abdomen is flat  Bowel sounds are normal       Palpations: Abdomen is soft  Tenderness: There is abdominal tenderness in the left upper quadrant  There is no right CVA tenderness, left CVA tenderness, guarding or rebound  Negative signs include Jurado's sign  Hernia: No hernia is present  Neurological:      Mental Status: She is alert

## 2021-07-15 NOTE — PATIENT INSTRUCTIONS
1  Increase fluids  2  Motrin as needed for pain  3  Quarantine until test results available  4   Follow-up with PCP if symptoms persist

## 2021-07-16 LAB — SARS-COV-2 RNA RESP QL NAA+PROBE: NEGATIVE

## 2021-08-05 ENCOUNTER — TELEPHONE (OUTPATIENT)
Dept: CARDIOLOGY CLINIC | Facility: CLINIC | Age: 56
End: 2021-08-05

## 2021-08-05 DIAGNOSIS — I10 ESSENTIAL HYPERTENSION: ICD-10-CM

## 2021-08-05 DIAGNOSIS — R07.9 CHEST PAIN, UNSPECIFIED TYPE: ICD-10-CM

## 2021-08-05 DIAGNOSIS — I16.0 HYPERTENSIVE URGENCY: ICD-10-CM

## 2021-08-05 DIAGNOSIS — E78.00 ELEVATED CHOLESTEROL: ICD-10-CM

## 2021-08-06 RX ORDER — ASPIRIN 81 MG/1
81 TABLET, CHEWABLE ORAL DAILY
Qty: 90 TABLET | Refills: 0 | Status: SHIPPED | OUTPATIENT
Start: 2021-08-06 | End: 2021-12-05 | Stop reason: SDUPTHER

## 2021-08-06 RX ORDER — ATORVASTATIN CALCIUM 40 MG/1
40 TABLET, FILM COATED ORAL
Qty: 90 TABLET | Refills: 0 | Status: SHIPPED | OUTPATIENT
Start: 2021-08-06 | End: 2021-12-05 | Stop reason: SDUPTHER

## 2021-08-06 RX ORDER — HYDROCHLOROTHIAZIDE 25 MG/1
25 TABLET ORAL DAILY
Qty: 90 TABLET | Refills: 0 | Status: SHIPPED | OUTPATIENT
Start: 2021-08-06 | End: 2021-12-05 | Stop reason: SDUPTHER

## 2021-08-19 ENCOUNTER — OFFICE VISIT (OUTPATIENT)
Dept: URGENT CARE | Facility: MEDICAL CENTER | Age: 56
End: 2021-08-19
Payer: COMMERCIAL

## 2021-08-19 VITALS
HEART RATE: 70 BPM | DIASTOLIC BLOOD PRESSURE: 94 MMHG | TEMPERATURE: 97.7 F | SYSTOLIC BLOOD PRESSURE: 148 MMHG | OXYGEN SATURATION: 100 % | RESPIRATION RATE: 18 BRPM | BODY MASS INDEX: 28.12 KG/M2 | WEIGHT: 144 LBS

## 2021-08-19 DIAGNOSIS — B34.9 ACUTE VIRAL SYNDROME: Primary | ICD-10-CM

## 2021-08-19 PROCEDURE — 99213 OFFICE O/P EST LOW 20 MIN: CPT | Performed by: PHYSICIAN ASSISTANT

## 2021-08-19 PROCEDURE — U0005 INFEC AGEN DETEC AMPLI PROBE: HCPCS | Performed by: PHYSICIAN ASSISTANT

## 2021-08-19 PROCEDURE — U0003 INFECTIOUS AGENT DETECTION BY NUCLEIC ACID (DNA OR RNA); SEVERE ACUTE RESPIRATORY SYNDROME CORONAVIRUS 2 (SARS-COV-2) (CORONAVIRUS DISEASE [COVID-19]), AMPLIFIED PROBE TECHNIQUE, MAKING USE OF HIGH THROUGHPUT TECHNOLOGIES AS DESCRIBED BY CMS-2020-01-R: HCPCS | Performed by: PHYSICIAN ASSISTANT

## 2021-08-19 NOTE — PATIENT INSTRUCTIONS
1  Increase fluids  2  Tylenol as needed for fever, body aches  3  Quarantine until test results available  4   Follow up with PCP in 3-5 days if symptoms persist

## 2021-08-19 NOTE — PROGRESS NOTES
[facility]        NAME: Juan Carlos Valverde is a 48 y o  female  : 1965    MRN: [id number]  DATE: 2021  TIME: 3:06 PM    Assessment and Plan   Acute viral syndrome [B34 9]  1  Acute viral syndrome  Novel Coronavirus (Covid-19),PCR [facility] - Office Collection         Patient Instructions     1  Increase fluids  2  Tylenol as needed for fever, body aches  3  Quarantine until test results available  4  Follow up with PCP in 3-5 days if symptoms persist       Chief Complaint     Chief Complaint   Patient presents with    Dizziness     Started monday with dizziness, lightheaded, feverish, covid exposure at work  Has been taking tylenol  Didn't receive covid vaccine  History of Present Illness         Gaynelle Lesches is a 71-year-old female presents with a 3 day history of generalized malaise, fever, chills, body aches, nausea and dizziness  Patient denies any vomiting or diarrhea since the onset of her symptoms  She has reported intermittent episodes of chest tightness and shortness of breath  Patient reports she was exposed to COVID-19 approximately 5 days prior, she is unvaccinated  Review of Systems   Review of Systems   Constitutional: Positive for chills, fatigue and fever  HENT: Positive for congestion  Respiratory: Positive for cough and shortness of breath  Cardiovascular: Negative  Gastrointestinal: Positive for nausea  Neurological: Positive for dizziness  Negative for weakness           Current Medications       Current Outpatient Medications:     aspirin 81 mg chewable tablet, Chew 1 tablet (81 mg total) daily, Disp: 90 tablet, Rfl: 0    atorvastatin (LIPITOR) 40 mg tablet, Take 1 tablet (40 mg total) by mouth daily with dinner, Disp: 90 tablet, Rfl: 0    DULoxetine (CYMBALTA) 20 mg capsule, Take 1 capsule (20 mg total) by mouth daily, Disp: 30 capsule, Rfl: 1    hydrochlorothiazide (HYDRODIURIL) 25 mg tablet, Take 1 tablet (25 mg total) by mouth daily, Disp: 90 tablet, Rfl: 0    metoprolol tartrate (LOPRESSOR) 25 mg tablet, Take 1 tablet (25 mg total) by mouth every 12 (twelve) hours, Disp: 180 tablet, Rfl: 0    cyclobenzaprine (FLEXERIL) 5 mg tablet, Take 1-2 PO qHS PRN pain (Patient not taking: Reported on 7/15/2021), Disp: 20 tablet, Rfl: 0    pantoprazole (PROTONIX) 40 mg tablet, Take 1 tablet (40 mg total) by mouth daily (Patient not taking: Reported on 8/19/2021), Disp: 90 tablet, Rfl: 0    Current Allergies     Allergies as of 08/19/2021    (No Known Allergies)            The following portions of the patient's history were reviewed and updated as appropriate: allergies, current medications, past family history, past medical history, past social history, past surgical history and problem list      Past Medical History:   Diagnosis Date    Fibroids     History of ovarian cyst     Hyperlipidemia     Hypertension     Migraine     Thyroid nodule        Past Surgical History:   Procedure Laterality Date    ENDOMETRIAL BIOPSY      W/o cervical dilation   Resolved 9/30/2014      TONSILLECTOMY      Age 6      TUBAL LIGATION         Family History   Problem Relation Age of Onset    Arthritis Mother     Diverticulitis Mother     Heart disease Mother     Hypertension Mother     Migraines Mother     Thyroid disease Mother     Arthritis Maternal Grandmother     Diabetes Maternal Grandmother     Heart disease Maternal Grandmother     Varicose Veins Maternal Grandmother     Heart attack Maternal Grandmother     Colon cancer Maternal Grandfather 58    Diabetes Paternal Grandmother     Heart disease Paternal Grandmother     Heart attack Paternal Grandmother     Heart disease Father     Heart disease Sister     Hypertension Sister     No Known Problems Daughter     Clotting disorder Paternal Grandfather     Heart disease Brother     Heart murmur Brother     No Known Problems Son     No Known Problems Son     Breast cancer Other Medications have been verified  Objective   /94   Pulse 70   Temp 97 7 °F (36 5 °C)   Resp 18   Wt 65 3 kg (144 lb)   SpO2 100%   BMI 28 12 kg/m²   No LMP recorded  Patient is postmenopausal        Physical Exam     Physical Exam  Constitutional:       General: She is not in acute distress  Appearance: Normal appearance  HENT:      Head: Normocephalic and atraumatic  Right Ear: Tympanic membrane and ear canal normal       Left Ear: Tympanic membrane and ear canal normal       Nose: Congestion present  No rhinorrhea  Mouth/Throat:      Lips: Pink  Pharynx: Oropharynx is clear  Cardiovascular:      Rate and Rhythm: Normal rate and regular rhythm  Heart sounds: Normal heart sounds, S1 normal and S2 normal  No murmur heard  Pulmonary:      Effort: Pulmonary effort is normal       Breath sounds: Normal breath sounds and air entry  Neurological:      Mental Status: She is alert

## 2021-08-20 LAB — SARS-COV-2 RNA RESP QL NAA+PROBE: NEGATIVE

## 2021-10-12 ENCOUNTER — OFFICE VISIT (OUTPATIENT)
Dept: URGENT CARE | Facility: MEDICAL CENTER | Age: 56
End: 2021-10-12
Payer: COMMERCIAL

## 2021-10-12 VITALS
BODY MASS INDEX: 27.48 KG/M2 | OXYGEN SATURATION: 99 % | RESPIRATION RATE: 18 BRPM | WEIGHT: 140 LBS | HEART RATE: 66 BPM | HEIGHT: 60 IN | TEMPERATURE: 96.1 F

## 2021-10-12 DIAGNOSIS — G44.209 TENSION HEADACHE: Primary | ICD-10-CM

## 2021-10-12 DIAGNOSIS — K52.9 NONINFECTIOUS GASTROENTERITIS, UNSPECIFIED TYPE: ICD-10-CM

## 2021-10-12 PROCEDURE — 99213 OFFICE O/P EST LOW 20 MIN: CPT | Performed by: PHYSICIAN ASSISTANT

## 2021-10-12 RX ORDER — CYCLOBENZAPRINE HCL 10 MG
10 TABLET ORAL
Qty: 20 TABLET | Refills: 0 | Status: SHIPPED | OUTPATIENT
Start: 2021-10-12 | End: 2022-08-07 | Stop reason: DRUGHIGH

## 2021-11-02 ENCOUNTER — TELEPHONE (OUTPATIENT)
Dept: FAMILY MEDICINE CLINIC | Facility: MEDICAL CENTER | Age: 56
End: 2021-11-02

## 2021-11-04 ENCOUNTER — OFFICE VISIT (OUTPATIENT)
Dept: URGENT CARE | Facility: MEDICAL CENTER | Age: 56
End: 2021-11-04
Payer: COMMERCIAL

## 2021-11-04 VITALS — OXYGEN SATURATION: 98 % | HEART RATE: 89 BPM | TEMPERATURE: 97.4 F

## 2021-11-04 DIAGNOSIS — B34.9 ACUTE VIRAL SYNDROME: Primary | ICD-10-CM

## 2021-11-04 PROCEDURE — U0005 INFEC AGEN DETEC AMPLI PROBE: HCPCS | Performed by: PHYSICIAN ASSISTANT

## 2021-11-04 PROCEDURE — U0003 INFECTIOUS AGENT DETECTION BY NUCLEIC ACID (DNA OR RNA); SEVERE ACUTE RESPIRATORY SYNDROME CORONAVIRUS 2 (SARS-COV-2) (CORONAVIRUS DISEASE [COVID-19]), AMPLIFIED PROBE TECHNIQUE, MAKING USE OF HIGH THROUGHPUT TECHNOLOGIES AS DESCRIBED BY CMS-2020-01-R: HCPCS | Performed by: PHYSICIAN ASSISTANT

## 2021-11-04 PROCEDURE — 99213 OFFICE O/P EST LOW 20 MIN: CPT | Performed by: PHYSICIAN ASSISTANT

## 2021-11-05 LAB — SARS-COV-2 RNA RESP QL NAA+PROBE: POSITIVE

## 2021-11-06 ENCOUNTER — TELEPHONE (OUTPATIENT)
Dept: URGENT CARE | Facility: MEDICAL CENTER | Age: 56
End: 2021-11-06

## 2021-11-06 ENCOUNTER — HOSPITAL ENCOUNTER (EMERGENCY)
Facility: HOSPITAL | Age: 56
Discharge: HOME | End: 2021-11-06
Attending: EMERGENCY MEDICINE
Payer: COMMERCIAL

## 2021-11-06 ENCOUNTER — APPOINTMENT (EMERGENCY)
Dept: RADIOLOGY | Facility: HOSPITAL | Age: 56
End: 2021-11-06
Payer: COMMERCIAL

## 2021-11-06 VITALS
HEIGHT: 60 IN | RESPIRATION RATE: 17 BRPM | DIASTOLIC BLOOD PRESSURE: 92 MMHG | HEART RATE: 74 BPM | TEMPERATURE: 98.9 F | WEIGHT: 140 LBS | BODY MASS INDEX: 27.48 KG/M2 | SYSTOLIC BLOOD PRESSURE: 186 MMHG | OXYGEN SATURATION: 98 %

## 2021-11-06 DIAGNOSIS — U07.1 COVID-19 VIRUS INFECTION: Primary | ICD-10-CM

## 2021-11-06 DIAGNOSIS — R05.9 COUGH: ICD-10-CM

## 2021-11-06 LAB
ATRIAL RATE: 76 BPM
GLUCOSE SERPL-MCNC: 99 MG/DL (ref 65–140)
P AXIS: 30 DEGREES
PR INTERVAL: 168 MS
QRS AXIS: 17 DEGREES
QRSD INTERVAL: 82 MS
QT INTERVAL: 410 MS
QTC INTERVAL: 452 MS
T WAVE AXIS: 22 DEGREES
VENTRICULAR RATE: 73 BPM

## 2021-11-06 PROCEDURE — 71045 X-RAY EXAM CHEST 1 VIEW: CPT

## 2021-11-06 PROCEDURE — 82948 REAGENT STRIP/BLOOD GLUCOSE: CPT

## 2021-11-06 PROCEDURE — 93005 ELECTROCARDIOGRAM TRACING: CPT

## 2021-11-06 PROCEDURE — 99284 EMERGENCY DEPT VISIT MOD MDM: CPT

## 2021-11-06 PROCEDURE — 93010 ELECTROCARDIOGRAM REPORT: CPT | Performed by: INTERNAL MEDICINE

## 2021-11-06 PROCEDURE — 99284 EMERGENCY DEPT VISIT MOD MDM: CPT | Performed by: EMERGENCY MEDICINE

## 2021-11-06 RX ORDER — ALBUTEROL SULFATE 90 UG/1
2 AEROSOL, METERED RESPIRATORY (INHALATION) ONCE
Status: COMPLETED | OUTPATIENT
Start: 2021-11-06 | End: 2021-11-06

## 2021-12-05 DIAGNOSIS — R07.9 CHEST PAIN, UNSPECIFIED TYPE: ICD-10-CM

## 2021-12-05 DIAGNOSIS — R10.12 LEFT UPPER QUADRANT PAIN: ICD-10-CM

## 2021-12-05 DIAGNOSIS — I16.0 HYPERTENSIVE URGENCY: ICD-10-CM

## 2021-12-05 DIAGNOSIS — E78.00 ELEVATED CHOLESTEROL: ICD-10-CM

## 2021-12-05 DIAGNOSIS — I10 ESSENTIAL HYPERTENSION: ICD-10-CM

## 2021-12-06 ENCOUNTER — TELEPHONE (OUTPATIENT)
Dept: GASTROENTEROLOGY | Facility: AMBULARY SURGERY CENTER | Age: 56
End: 2021-12-06

## 2021-12-08 RX ORDER — ATORVASTATIN CALCIUM 40 MG/1
40 TABLET, FILM COATED ORAL
Qty: 30 TABLET | Refills: 0 | Status: SHIPPED | OUTPATIENT
Start: 2021-12-08 | End: 2022-01-03

## 2021-12-08 RX ORDER — ASPIRIN 81 MG/1
81 TABLET, CHEWABLE ORAL DAILY
Qty: 90 TABLET | Refills: 0 | Status: SHIPPED | OUTPATIENT
Start: 2021-12-08

## 2021-12-08 RX ORDER — HYDROCHLOROTHIAZIDE 25 MG/1
25 TABLET ORAL DAILY
Qty: 30 TABLET | Refills: 0 | Status: SHIPPED | OUTPATIENT
Start: 2021-12-08 | End: 2022-06-01

## 2021-12-13 RX ORDER — PANTOPRAZOLE SODIUM 40 MG/1
40 TABLET, DELAYED RELEASE ORAL DAILY
Qty: 90 TABLET | Refills: 0 | Status: SHIPPED | OUTPATIENT
Start: 2021-12-13

## 2021-12-18 DIAGNOSIS — I16.0 HYPERTENSIVE URGENCY: ICD-10-CM

## 2021-12-27 ENCOUNTER — TELEPHONE (OUTPATIENT)
Dept: GASTROENTEROLOGY | Facility: AMBULARY SURGERY CENTER | Age: 56
End: 2021-12-27

## 2022-01-02 DIAGNOSIS — R07.9 CHEST PAIN, UNSPECIFIED TYPE: ICD-10-CM

## 2022-01-03 RX ORDER — ATORVASTATIN CALCIUM 40 MG/1
TABLET, FILM COATED ORAL
Qty: 30 TABLET | Refills: 0 | Status: SHIPPED | OUTPATIENT
Start: 2022-01-03 | End: 2022-02-01

## 2022-01-04 ENCOUNTER — OFFICE VISIT (OUTPATIENT)
Dept: URGENT CARE | Facility: MEDICAL CENTER | Age: 57
End: 2022-01-04
Payer: COMMERCIAL

## 2022-01-04 ENCOUNTER — TELEPHONE (OUTPATIENT)
Dept: GASTROENTEROLOGY | Facility: AMBULARY SURGERY CENTER | Age: 57
End: 2022-01-04

## 2022-01-04 VITALS — OXYGEN SATURATION: 100 % | HEART RATE: 84 BPM | TEMPERATURE: 97.2 F

## 2022-01-04 DIAGNOSIS — R68.89 FLU-LIKE SYMPTOMS: Primary | ICD-10-CM

## 2022-01-04 PROCEDURE — 99213 OFFICE O/P EST LOW 20 MIN: CPT | Performed by: PHYSICIAN ASSISTANT

## 2022-01-04 PROCEDURE — 87636 SARSCOV2 & INF A&B AMP PRB: CPT | Performed by: PHYSICIAN ASSISTANT

## 2022-01-05 NOTE — PATIENT INSTRUCTIONS
Flu like symptoms  covid test sent  Follow up with PCP in 3-5 days  Proceed to  ER if symptoms worsen  [address]    Your healthcare provider and/or public health staff have evaluated you and have determined that you do not need to remain in the hospital at this time  At this time you can be isolated at home where you will be monitored by staff from your local or state health department  You should carefully follow the prevention and isolation steps below until a healthcare provider or local or state health department says that you can return to your normal activities  Stay home except to get medical care    People who are mildly ill with COVID-19 are able to isolate at home during their illness  You should restrict activities outside your home, except for getting medical care  Do not go to work, school, or public areas  Avoid using public transportation, ride-sharing, or taxis  Separate yourself from other people and animals in your home    People: As much as possible, you should stay in a specific room and away from other people in your home  Also, you should use a separate bathroom, if available  Animals: You should restrict contact with pets and other animals while you are sick with COVID-19, just like you would around other people  Although there have not been reports of pets or other animals becoming sick with COVID-19, it is still recommended that people sick with COVID-19 limit contact with animals until more information is known about the virus  When possible, have another member of your household care for your animals while you are sick  If you are sick with COVID-19, avoid contact with your pet, including petting, snuggling, being kissed or licked, and sharing food  If you must care for your pet or be around animals while you are sick, wash your hands before and after you interact with pets and wear a facemask  See COVID-19 and Animals for more information      Call ahead before visiting your doctor    If you have a medical appointment, call the healthcare provider and tell them that you have or may have COVID-19  This will help the healthcare providers office take steps to keep other people from getting infected or exposed  Wear a facemask    You should wear a facemask when you are around other people (e g , sharing a room or vehicle) or pets and before you enter a healthcare providers office  If you are not able to wear a facemask (for example, because it causes trouble breathing), then people who live with you should not stay in the same room with you, or they should wear a facemask if they enter your room  Cover your coughs and sneezes    Cover your mouth and nose with a tissue when you cough or sneeze  Throw used tissues in a lined trash can  Immediately wash your hands with soap and water for at least 20 seconds or, if soap and water are not available, clean your hands with an alcohol-based hand  that contains at least 60% alcohol  Clean your hands often    Wash your hands often with soap and water for at least 20 seconds, especially after blowing your nose, coughing, or sneezing; going to the bathroom; and before eating or preparing food  If soap and water are not readily available, use an alcohol-based hand  with at least 60% alcohol, covering all surfaces of your hands and rubbing them together until they feel dry  Soap and water are the best option if hands are visibly dirty  Avoid touching your eyes, nose, and mouth with unwashed hands  Avoid sharing personal household items    You should not share dishes, drinking glasses, cups, eating utensils, towels, or bedding with other people or pets in your home  After using these items, they should be washed thoroughly with soap and water      Clean all high-touch surfaces everyday    High touch surfaces include counters, tabletops, doorknobs, bathroom fixtures, toilets, phones, keyboards, tablets, and bedside tables  Also, clean any surfaces that may have blood, stool, or body fluids on them  Use a household cleaning spray or wipe, according to the label instructions  Labels contain instructions for safe and effective use of the cleaning product including precautions you should take when applying the product, such as wearing gloves and making sure you have good ventilation during use of the product  Monitor your symptoms    Seek prompt medical attention if your illness is worsening (e g , difficulty breathing)  Before seeking care, call your healthcare provider and tell them that you have, or are being evaluated for, COVID-19  Put on a facemask before you enter the facility  These steps will help the healthcare providers office to keep other people in the office or waiting room from getting infected or exposed  Ask your healthcare provider to call the local or [facility] health department  Persons who are placed under active monitoring or facilitated self-monitoring should follow instructions provided by their local health department or occupational health professionals, as appropriate  If you have a medical emergency and need to call 911, notify the dispatch personnel that you have, or are being evaluated for COVID-19  If possible, put on a facemask before emergency medical services arrive      Discontinuing home isolation    Patients with confirmed COVID-19 should remain under home isolation precautions until the following conditions are met:   - They have had no fever for at least 24 hours (that is one full day of no fever without the use medicine that reduces fevers)  AND  - other symptoms have improved (for example, when their cough or shortness of breath have improved)  AND  - If had mild or moderate illness, at least 10 days have passed since their symptoms first appeared or if severe illness (needed oxygen) or immunosuppressed, at least 20 days have passed since symptoms first appeared  Patients with confirmed COVID-19 should also notify close contacts (including their workplace) and ask that they self-quarantine  Currently, close contact is defined as being within 6 feet for 15 minutes or more from the period 24 hours starting 48 hours before symptom onset to the time at which the patient went into isolation  Close contacts of patients diagnosed with COVID-19 should be instructed by the patient to self-quarantine for 14 days from the last time of their last contact with the patient       Source: [employer]

## 2022-01-05 NOTE — PROGRESS NOTES
[facility] [employer]        NAME: Beverly Hickey is a 55 y o  female  : 1965    MRN: [id number]  DATE: 2022  TIME: 8:04 PM    Assessment and Plan   Flu-like symptoms [R68 89]  1  Flu-like symptoms           Patient Instructions     Flu like symptoms  covid test sent  Follow up with PCP in 3-5 days  Proceed to  ER if symptoms worsen  Chief Complaint     Chief Complaint   Patient presents with    Headache     started 2 days ago, not vaccinated, congestion     Sore Throat         History of Present Illness       56 y/o female presents c/o cough, generalized body aches x 2 days  Denies chest pain, SOB, fever, chills      Review of Systems   Review of Systems   Constitutional: Negative for activity change, appetite change, chills, diaphoresis, fatigue and fever  HENT: Positive for congestion, ear pain, rhinorrhea and sore throat  Negative for ear discharge, facial swelling, hearing loss, mouth sores, nosebleeds, postnasal drip, sinus pressure, sinus pain, sneezing and voice change  Respiratory: Positive for cough  Negative for apnea, choking, chest tightness, shortness of breath, wheezing and stridor  Cardiovascular: Negative            Current Medications       Current Outpatient Medications:     aspirin 81 mg chewable tablet, Chew 1 tablet (81 mg total) daily, Disp: 90 tablet, Rfl: 0    atorvastatin (LIPITOR) 40 mg tablet, TAKE 1 TABLET BY MOUTH DAILY WITH DINNER, Disp: 30 tablet, Rfl: 0    cyclobenzaprine (FLEXERIL) 10 mg tablet, Take 1 tablet (10 mg total) by mouth daily at bedtime, Disp: 20 tablet, Rfl: 0    cyclobenzaprine (FLEXERIL) 5 mg tablet, Take 1-2 PO qHS PRN pain (Patient not taking: Reported on 7/15/2021), Disp: 20 tablet, Rfl: 0    DULoxetine (CYMBALTA) 20 mg capsule, Take 1 capsule (20 mg total) by mouth daily, Disp: 30 capsule, Rfl: 1    hydrochlorothiazide (HYDRODIURIL) 25 mg tablet, Take 1 tablet (25 mg total) by mouth daily, Disp: 30 tablet, Rfl: 0   metoprolol tartrate (LOPRESSOR) 25 mg tablet, TAKE 1 TABLET (25 MG TOTAL) BY MOUTH EVERY 12 (TWELVE) HOURS, Disp: 30 tablet, Rfl: 0    pantoprazole (PROTONIX) 40 mg tablet, Take 1 tablet (40 mg total) by mouth daily, Disp: 90 tablet, Rfl: 0    Current Allergies     Allergies as of 01/04/2022    (No Known Allergies)            The following portions of the patient's history were reviewed and updated as appropriate: allergies, current medications, past family history, past medical history, past social history, past surgical history and problem list      Past Medical History:   Diagnosis Date    Fibroids     History of ovarian cyst     Hyperlipidemia     Hypertension     Migraine     Thyroid nodule        Past Surgical History:   Procedure Laterality Date    ENDOMETRIAL BIOPSY      W/o cervical dilation  Resolved 9/30/2014      TONSILLECTOMY      Age 6      TUBAL LIGATION         Family History   Problem Relation Age of Onset    Arthritis Mother     Diverticulitis Mother     Heart disease Mother     Hypertension Mother     Migraines Mother     Thyroid disease Mother     Arthritis Maternal Grandmother     Diabetes Maternal Grandmother     Heart disease Maternal Grandmother     Varicose Veins Maternal Grandmother     Heart attack Maternal Grandmother     Colon cancer Maternal Grandfather 58    Diabetes Paternal Grandmother     Heart disease Paternal Grandmother     Heart attack Paternal Grandmother     Heart disease Father     Heart disease Sister     Hypertension Sister     No Known Problems Daughter     Clotting disorder Paternal Grandfather     Heart disease Brother     Heart murmur Brother     No Known Problems Son     No Known Problems Son     Breast cancer Other          Medications have been verified  Objective   Pulse 84   Temp (!) 97 2 °F (36 2 °C)   SpO2 100%        Physical Exam     Physical Exam  Constitutional:       General: She is not in acute distress  Appearance: She is well-developed  She is not diaphoretic  HENT:      Head: Normocephalic and atraumatic  Right Ear: Hearing, tympanic membrane, ear canal and external ear normal       Left Ear: Hearing, tympanic membrane, ear canal and external ear normal       Nose: Rhinorrhea present  Mouth/Throat:      Pharynx: Uvula midline  Cardiovascular:      Rate and Rhythm: Normal rate and regular rhythm  Heart sounds: Normal heart sounds  Pulmonary:      Effort: Pulmonary effort is normal       Breath sounds: Normal breath sounds  Musculoskeletal:      Cervical back: Normal range of motion and neck supple  Lymphadenopathy:      Cervical: Cervical adenopathy present

## 2022-01-09 LAB
FLUAV RNA RESP QL NAA+PROBE: NEGATIVE
FLUBV RNA RESP QL NAA+PROBE: NEGATIVE
SARS-COV-2 RNA RESP QL NAA+PROBE: NEGATIVE

## 2022-01-12 ENCOUNTER — OFFICE VISIT (OUTPATIENT)
Dept: CARDIOLOGY CLINIC | Facility: MEDICAL CENTER | Age: 57
End: 2022-01-12
Payer: COMMERCIAL

## 2022-01-12 VITALS
SYSTOLIC BLOOD PRESSURE: 120 MMHG | RESPIRATION RATE: 18 BRPM | HEART RATE: 89 BPM | OXYGEN SATURATION: 97 % | DIASTOLIC BLOOD PRESSURE: 74 MMHG | HEIGHT: 60 IN | WEIGHT: 144 LBS | BODY MASS INDEX: 28.27 KG/M2

## 2022-01-12 DIAGNOSIS — I10 PRIMARY HYPERTENSION: ICD-10-CM

## 2022-01-12 DIAGNOSIS — R42 DIZZINESS: ICD-10-CM

## 2022-01-12 DIAGNOSIS — E78.2 MIXED HYPERLIPIDEMIA: Primary | ICD-10-CM

## 2022-01-12 DIAGNOSIS — R06.00 DOE (DYSPNEA ON EXERTION): ICD-10-CM

## 2022-01-12 PROBLEM — R06.09 DOE (DYSPNEA ON EXERTION): Status: ACTIVE | Noted: 2022-01-12

## 2022-01-12 PROCEDURE — 99214 OFFICE O/P EST MOD 30 MIN: CPT | Performed by: INTERNAL MEDICINE

## 2022-01-12 NOTE — PROGRESS NOTES
Cardiology Follow Up    Zara Fowler  1965  [id number]  [facility]  [facility]  [address]  [phone]    1  Mixed hyperlipidemia     2  Primary hypertension         Diagnoses and all orders for this visit:    Mixed hyperlipidemia    Primary hypertension      I had the pleasure of seeing Zara Fowler for a follow up visit  INTERVAL HISTORY: chest pains    History of the presenting illness, Discussion/Summary and My Plan are as follows:::    She is a very pleasant 60-year-old lady with a history of hypertension and dyslipidemia-both untreated till 2019, no diabetes, strong family history of premature vascular disease, no personal history of tobacco use, some passive exposure to smoke, who had not sought any medical attention for some time and then got medical insurance and sought attention for chest pains, evaluated by Dr Leal, found to be hypertensive and sent to the ER where blood pressure was elevated even further to 240/114 in May 2019  At that point evaluation for chest pains was negative, she had a CT scan an echocardiogram and subsequently a pharmacologic stress test all of which were essentially unremarkable  In March 2020, complained of chest pain suggestive of angina, underwent coronary angiography that was negative for any obstructive disease  Fatigue has improved with better sleep  Feels sporadically short of breath, only at times  Physically moderately active at work  Has had chronic left arm paresthesias, also sometimes feels dizzy with walking  All prior testing was negative-echo 2019, nuclear stress test 2019, catheterization 2020, exam does not show any volume overload     On a statin, her lipids have significantly improved from 192-101       Plan:    Shortness of breath and dizziness:   Will check an echocardiogram and a treadmill stress test,     Chest pain: were suggestive of unstable angina and underwent coronary angiography without any obstructive disease - Mar 2020  Developed some radial artery site pain, with normal pulses that improved with conservative management  She has a lot of family stressors that are unlikely to change any time soon  I told her that she needs to seek help or find a way to address them for her own mental hygiene  Hypertension:  currently well controlled on hydrochlorothiazide and metoprolol, no changes at this time     Dyslipidemia:  Now on a statin, lipids have improved, await coronary angiography results      follow-up in 6 months      Results for Audra Dumont (MRN [id number]) as of 1/30/2020 16:23   Ref   Range 5/3/2019 05:05 10/24/2019 09:02 1/6/2020 15:31   Cholesterol Latest Ref Range: 50 - 200 mg/dL 280 (H) 196 225 (H)   Triglycerides Latest Ref Range: <=150 mg/dL 61 123 312 (H)   HDL Latest Ref Range: >=40 mg/dL 76 (H) 69 62   LDL Direct Latest Ref Range: 0 - 100 mg/dL 192 (H) 102 (H) 101 (H)       Patient Active Problem List   Diagnosis    Diastolic dysfunction    Lung nodule    Hypertension    Heel spur, left    Hyperlipidemia    Gastric erosions    Anxiety     Past Medical History:   Diagnosis Date    Fibroids     History of ovarian cyst     Hyperlipidemia     Hypertension     Migraine     Thyroid nodule      Social History     Socioeconomic History    Marital status:      Spouse name: Not on file    Number of children: Not on file    Years of education: Not on file    Highest education level: Not on file   Occupational History    Not on file   Tobacco Use    Smoking status: Never Smoker    Smokeless tobacco: Never Used   Vaping Use    Vaping Use: Never used   Substance and Sexual Activity    Alcohol use: Yes     Comment: occasional    Drug use: Never    Sexual activity: Yes     Partners: Male     Birth control/protection: Post-menopausal, Female Sterilization Other Topics Concern    Not on file   Social History Narrative    [facility] history: Currently  - [facility]      Social Determinants of Health     Financial Resource Strain: Not on file   Food Insecurity: Not on file   Transportation Needs: Not on file   Physical Activity: Not on file   Stress: Not on file   Social Connections: Not on file   Intimate Partner Violence: Not on file   Housing Stability: Not on file      Family History   Problem Relation Age of Onset    Arthritis Mother     Diverticulitis Mother     Heart disease Mother     Hypertension Mother     Migraines Mother     Thyroid disease Mother     Arthritis Maternal Grandmother     Diabetes Maternal Grandmother     Heart disease Maternal Grandmother     Varicose Veins Maternal Grandmother     Heart attack Maternal Grandmother     Colon cancer Maternal Grandfather 58    Diabetes Paternal Grandmother     Heart disease Paternal Grandmother     Heart attack Paternal Grandmother     Heart disease Father     Heart disease Sister     Hypertension Sister     No Known Problems Daughter     Clotting disorder Paternal Grandfather     Heart disease Brother     Heart murmur Brother     No Known Problems Son     No Known Problems Son     Breast cancer Other      Past Surgical History:   Procedure Laterality Date    ENDOMETRIAL BIOPSY      W/o cervical dilation   Resolved 9/30/2014      TONSILLECTOMY      Age 6      TUBAL LIGATION         Current Outpatient Medications:     aspirin 81 mg chewable tablet, Chew 1 tablet (81 mg total) daily, Disp: 90 tablet, Rfl: 0    atorvastatin (LIPITOR) 40 mg tablet, TAKE 1 TABLET BY MOUTH DAILY WITH DINNER, Disp: 30 tablet, Rfl: 0    DULoxetine (CYMBALTA) 20 mg capsule, Take 1 capsule (20 mg total) by mouth daily, Disp: 30 capsule, Rfl: 1    hydrochlorothiazide (HYDRODIURIL) 25 mg tablet, Take 1 tablet (25 mg total) by mouth daily, Disp: 30 tablet, Rfl: 0    metoprolol tartrate (LOPRESSOR) 25 mg tablet, TAKE 1 TABLET (25 MG TOTAL) BY MOUTH EVERY 12 (TWELVE) HOURS, Disp: 30 tablet, Rfl: 0    pantoprazole (PROTONIX) 40 mg tablet, Take 1 tablet (40 mg total) by mouth daily, Disp: 90 tablet, Rfl: 0    cyclobenzaprine (FLEXERIL) 10 mg tablet, Take 1 tablet (10 mg total) by mouth daily at bedtime, Disp: 20 tablet, Rfl: 0    cyclobenzaprine (FLEXERIL) 5 mg tablet, Take 1-2 PO qHS PRN pain (Patient not taking: Reported on 7/15/2021), Disp: 20 tablet, Rfl: 0  No Known Allergies    Imaging: No results found  Review of Systems:  Review of Systems   Constitutional: Negative  HENT: Negative  Eyes: Negative  Respiratory: Negative  Cardiovascular: Negative  Endocrine: Negative  Musculoskeletal: Negative  Neurological: Negative  Physical Exam:  /74 (BP Location: Left arm, Patient Position: Sitting, Cuff Size: Adult)   Pulse 89   Resp 18   Ht 5' (1 524 m)   Wt 65 3 kg (144 lb)   SpO2 97%   BMI 28 12 kg/m²   Physical Exam  Constitutional:       Appearance: She is well-developed  She is not ill-appearing, toxic-appearing or diaphoretic  HENT:      Head: Normocephalic  Mouth/Throat:      Pharynx: No oropharyngeal exudate  Eyes:      Pupils: Pupils are equal, round, and reactive to light  Neck:      Thyroid: No thyromegaly  Vascular: No hepatojugular reflux or JVD  Trachea: No tracheal deviation  Cardiovascular:      Rate and Rhythm: Normal rate and regular rhythm  Pulmonary:      Effort: Pulmonary effort is normal  No tachypnea, bradypnea, accessory muscle usage or respiratory distress  Breath sounds: Normal breath sounds  No decreased breath sounds, wheezing or rales  Chest:      Chest wall: No mass, lacerations, tenderness or crepitus  Abdominal:      General: There is no distension  Palpations: Abdomen is soft  There is no mass  Tenderness: There is no abdominal tenderness  There is no guarding     Musculoskeletal: Cervical back: Normal range of motion  Right lower leg: Normal  No edema  Left lower leg: No edema  Skin:     General: Skin is warm  Coloration: Skin is not pale  Findings: No erythema or rash

## 2022-01-30 DIAGNOSIS — R07.9 CHEST PAIN, UNSPECIFIED TYPE: ICD-10-CM

## 2022-02-01 ENCOUNTER — PATIENT MESSAGE (OUTPATIENT)
Dept: FAMILY MEDICINE CLINIC | Facility: MEDICAL CENTER | Age: 57
End: 2022-02-01

## 2022-02-01 RX ORDER — ATORVASTATIN CALCIUM 40 MG/1
TABLET, FILM COATED ORAL
Qty: 30 TABLET | Refills: 0 | Status: SHIPPED | OUTPATIENT
Start: 2022-02-01 | End: 2022-02-18

## 2022-02-17 DIAGNOSIS — R07.9 CHEST PAIN, UNSPECIFIED TYPE: ICD-10-CM

## 2022-02-18 RX ORDER — ATORVASTATIN CALCIUM 40 MG/1
TABLET, FILM COATED ORAL
Qty: 90 TABLET | Refills: 1 | Status: SHIPPED | OUTPATIENT
Start: 2022-02-18

## 2022-03-29 ENCOUNTER — VBI (OUTPATIENT)
Dept: ADMINISTRATIVE | Facility: OTHER | Age: 57
End: 2022-03-29

## 2022-04-26 ENCOUNTER — OCCMED (OUTPATIENT)
Dept: URGENT CARE | Age: 57
End: 2022-04-26
Payer: OTHER MISCELLANEOUS

## 2022-04-26 ENCOUNTER — APPOINTMENT (OUTPATIENT)
Dept: RADIOLOGY | Age: 57
End: 2022-04-26
Payer: OTHER MISCELLANEOUS

## 2022-04-26 DIAGNOSIS — S89.91XA INJURY OF RIGHT KNEE, INITIAL ENCOUNTER: Primary | ICD-10-CM

## 2022-04-26 DIAGNOSIS — S89.91XA INJURY OF RIGHT KNEE, INITIAL ENCOUNTER: ICD-10-CM

## 2022-04-26 PROCEDURE — 99283 EMERGENCY DEPT VISIT LOW MDM: CPT | Performed by: NURSE PRACTITIONER

## 2022-04-26 PROCEDURE — G0382 LEV 3 HOSP TYPE B ED VISIT: HCPCS | Performed by: NURSE PRACTITIONER

## 2022-04-26 PROCEDURE — 73564 X-RAY EXAM KNEE 4 OR MORE: CPT

## 2022-05-31 DIAGNOSIS — I16.0 HYPERTENSIVE URGENCY: ICD-10-CM

## 2022-06-01 RX ORDER — HYDROCHLOROTHIAZIDE 25 MG/1
TABLET ORAL
Qty: 90 TABLET | Refills: 3 | Status: SHIPPED | OUTPATIENT
Start: 2022-06-01

## 2022-08-07 ENCOUNTER — OFFICE VISIT (OUTPATIENT)
Dept: URGENT CARE | Facility: MEDICAL CENTER | Age: 57
End: 2022-08-07
Payer: COMMERCIAL

## 2022-08-07 ENCOUNTER — APPOINTMENT (OUTPATIENT)
Dept: RADIOLOGY | Facility: MEDICAL CENTER | Age: 57
End: 2022-08-07
Attending: PHYSICIAN ASSISTANT
Payer: COMMERCIAL

## 2022-08-07 VITALS
SYSTOLIC BLOOD PRESSURE: 140 MMHG | WEIGHT: 156.25 LBS | HEART RATE: 80 BPM | DIASTOLIC BLOOD PRESSURE: 90 MMHG | RESPIRATION RATE: 14 BRPM | BODY MASS INDEX: 30.52 KG/M2 | OXYGEN SATURATION: 98 % | TEMPERATURE: 98.2 F

## 2022-08-07 DIAGNOSIS — M54.50 ACUTE LEFT-SIDED LOW BACK PAIN WITHOUT SCIATICA: ICD-10-CM

## 2022-08-07 DIAGNOSIS — M54.2 NECK PAIN: Primary | ICD-10-CM

## 2022-08-07 DIAGNOSIS — M54.2 NECK PAIN: ICD-10-CM

## 2022-08-07 PROCEDURE — 72040 X-RAY EXAM NECK SPINE 2-3 VW: CPT

## 2022-08-07 PROCEDURE — G0382 LEV 3 HOSP TYPE B ED VISIT: HCPCS | Performed by: PHYSICIAN ASSISTANT

## 2022-08-07 PROCEDURE — 72100 X-RAY EXAM L-S SPINE 2/3 VWS: CPT

## 2022-08-07 RX ORDER — CYCLOBENZAPRINE HCL 10 MG
10 TABLET ORAL 3 TIMES DAILY
Qty: 15 TABLET | Refills: 0 | Status: SHIPPED | OUTPATIENT
Start: 2022-08-07 | End: 2022-08-12
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
## 2022-08-07 NOTE — PATIENT INSTRUCTIONS
1  Motrin as needed for pain  2  Take Flexeril 10 mg up to 3x daily as needed for spasm  3  Apply heat to neck and back 10-15min 3-4x daily  4   Recommend consult with Orthopedics if symptoms persist

## 2022-08-27 ENCOUNTER — OFFICE VISIT (OUTPATIENT)
Dept: OBGYN CLINIC | Facility: MEDICAL CENTER | Age: 57
End: 2022-08-27
Payer: COMMERCIAL

## 2022-08-27 VITALS
DIASTOLIC BLOOD PRESSURE: 91 MMHG | HEART RATE: 84 BPM | BODY MASS INDEX: 30.23 KG/M2 | HEIGHT: 60 IN | WEIGHT: 154 LBS | SYSTOLIC BLOOD PRESSURE: 159 MMHG

## 2022-08-27 DIAGNOSIS — M54.50 ACUTE MIDLINE LOW BACK PAIN WITHOUT SCIATICA: ICD-10-CM

## 2022-08-27 DIAGNOSIS — M47.816 FACET ARTHROPATHY, LUMBAR: ICD-10-CM

## 2022-08-27 DIAGNOSIS — M50.30 DEGENERATIVE CERVICAL DISC: ICD-10-CM
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
## 2022-08-27 DIAGNOSIS — M54.2 NECK PAIN: Primary | ICD-10-CM

## 2022-08-27 PROCEDURE — 99204 OFFICE O/P NEW MOD 45 MIN: CPT | Performed by: EMERGENCY MEDICINE

## 2022-08-27 RX ORDER — METHYLPREDNISOLONE 4 MG/1
TABLET ORAL
Qty: 1 EACH | Refills: 0 | Status: SHIPPED | OUTPATIENT
Start: 2022-08-27

## 2022-08-27 RX ORDER — MELOXICAM 15 MG/1
15 TABLET ORAL DAILY
Qty: 30 TABLET | Refills: 1 | Status: SHIPPED | OUTPATIENT
Start: 2022-08-27
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
## 2022-08-27 NOTE — PATIENT INSTRUCTIONS
While taking the oral steroid Medrol Dose Pack, do not take any NSAIDs such as Advil, Motrin, ibuprofen, Motrin, Aleve, naproxen, Celebrex or Meloxicam   You can restart the NSAIDs after you finish the steroids  However you may take Tylenol 500mg every 4-6 hours as needed OR max 1,000mg per dose up to 3 times per day for a total of 3,000mg per day  While taking oral steroids, you may experience mild side effects such as feeling jittery or flushing  Please call if your side effects are significant or you have any questions  While taking meloxicam do not take any other NSAIDs such as Advil, Motrin, ibuprofen, Celebrex, naproxen or Aleve  However you may take Tylenol    You may also take Tylenol 500mg every 4-6 hours as needed OR max 1,000mg per dose up to 3 times per day for a total of 3,000mg per day

## 2022-09-21 ENCOUNTER — OFFICE VISIT (OUTPATIENT)
Dept: URGENT CARE | Facility: MEDICAL CENTER | Age: 57
End: 2022-09-21
Payer: COMMERCIAL

## 2022-09-21 VITALS
BODY MASS INDEX: 29.25 KG/M2 | HEART RATE: 93 BPM | WEIGHT: 149 LBS | RESPIRATION RATE: 20 BRPM | SYSTOLIC BLOOD PRESSURE: 149 MMHG | HEIGHT: 60 IN | TEMPERATURE: 98.2 F | OXYGEN SATURATION: 96 % | DIASTOLIC BLOOD PRESSURE: 67 MMHG

## 2022-09-21 DIAGNOSIS — R11.0 NAUSEA: ICD-10-CM

## 2022-09-21 DIAGNOSIS — U07.1 COVID: Primary | ICD-10-CM

## 2022-09-21 LAB
SARS-COV-2 AG UPPER RESP QL IA: POSITIVE
VALID CONTROL: ABNORMAL

## 2022-09-21 PROCEDURE — 87811 SARS-COV-2 COVID19 W/OPTIC: CPT

## 2022-09-21 PROCEDURE — 99213 OFFICE O/P EST LOW 20 MIN: CPT | Performed by: PHYSICIAN ASSISTANT

## 2022-09-21 RX ORDER — ONDANSETRON 4 MG/1
4 TABLET, FILM COATED ORAL EVERY 8 HOURS PRN
Qty: 20 TABLET | Refills: 0 | Status: SHIPPED | OUTPATIENT
Start: 2022-09-21

## 2022-09-21 NOTE — LETTER
September 21, 2022     Patient: Joss Arevalo   YOB: 1965   Date of Visit: 9/21/2022       To Whom it May Concern:    Joss Arevalo was seen in my clinic on 9/21/2022  If Covid and flu tests are negative, patient may return to work when fever free for 24 hours without the use of a fever reducing agent  If covid or flu test is positive, patient may return to work 9/26/22 and when fever free for 24 hours without the use of a fever reducing agent  Upon return, the patient must then adhere to strict masking for an additional 5 days  If you have any questions or concerns, please don't hesitate to call           Sincerely,          [facility]        CC: No Recipients

## 2022-09-21 NOTE — PROGRESS NOTES
[facility]        NAME: Meera Corbett is a 55 y o  female  : 1965    MRN: [id number]  DATE: 2022  TIME: 12:27 PM    Assessment and Plan   COVID [U07 1]  1  COVID           Patient Instructions     COVID    Follow up with PCP in 3-5 days  Proceed to  ER if symptoms worsen  Chief Complaint     Chief Complaint   Patient presents with    Cold Like Symptoms     Pt has bodyaches, fever, vomiting and sore throat x2 days  Family in home were positive for covid    Vomiting         History of Present Illness       52-year-old female presents complaining of cough, congestion, body aches, fevers, nausea, vomiting, flu-like symptoms times 48 hours  Patient states that her family has tested positive for COVID  Denies chest pain, shortness off breath      Review of Systems   Review of Systems   Constitutional: Positive for fever  Negative for activity change, appetite change, chills, diaphoresis and fatigue  HENT: Positive for congestion, ear pain, rhinorrhea and sore throat  Negative for ear discharge, facial swelling, hearing loss, mouth sores, nosebleeds, postnasal drip, sinus pressure, sinus pain, sneezing and voice change  Respiratory: Positive for cough  Negative for apnea, choking, chest tightness, shortness of breath, wheezing and stridor  Cardiovascular: Negative            Current Medications       Current Outpatient Medications:     aspirin 81 mg chewable tablet, Chew 1 tablet (81 mg total) daily, Disp: 90 tablet, Rfl: 0    atorvastatin (LIPITOR) 40 mg tablet, TAKE 1 TABLET BY MOUTH EVERY DAY WITH DINNER, Disp: 90 tablet, Rfl: 1    DULoxetine (CYMBALTA) 20 mg capsule, Take 1 capsule (20 mg total) by mouth daily, Disp: 30 capsule, Rfl: 1    hydrochlorothiazide (HYDRODIURIL) 25 mg tablet, TAKE 1 TABLET BY MOUTH EVERY DAY, Disp: 90 tablet, Rfl: 3    meloxicam (Mobic) 15 mg tablet, Take 1 tablet (15 mg total) by mouth daily, Disp: 30 tablet, Rfl: 1   methylPREDNISolone 4 MG tablet therapy pack, Use as directed on package, Disp: 1 each, Rfl: 0    metoprolol tartrate (LOPRESSOR) 25 mg tablet, TAKE 1 TABLET (25 MG TOTAL) BY MOUTH EVERY 12 (TWELVE) HOURS, Disp: 30 tablet, Rfl: 0    cyclobenzaprine (FLEXERIL) 10 mg tablet, Take 1 tablet (10 mg total) by mouth 3 (three) times a day for 5 days, Disp: 15 tablet, Rfl: 0    pantoprazole (PROTONIX) 40 mg tablet, Take 1 tablet (40 mg total) by mouth daily (Patient not taking: No sig reported), Disp: 90 tablet, Rfl: 0    Current Allergies     Allergies as of 09/21/2022    (No Known Allergies)            The following portions of the patient's history were reviewed and updated as appropriate: allergies, current medications, past family history, past medical history, past social history, past surgical history and problem list      Past Medical History:   Diagnosis Date    Fibroids     History of ovarian cyst     Hyperlipidemia     Hypertension     Migraine     Thyroid nodule        Past Surgical History:   Procedure Laterality Date    ENDOMETRIAL BIOPSY      W/o cervical dilation   Resolved 9/30/2014      TONSILLECTOMY      Age 6      TUBAL LIGATION         Family History   Problem Relation Age of Onset    Arthritis Mother     Diverticulitis Mother     Heart disease Mother     Hypertension Mother     Migraines Mother     Thyroid disease Mother     Arthritis Maternal Grandmother     Diabetes Maternal Grandmother     Heart disease Maternal Grandmother     Varicose Veins Maternal Grandmother     Heart attack Maternal Grandmother     Colon cancer Maternal Grandfather 58    Diabetes Paternal Grandmother     Heart disease Paternal Grandmother     Heart attack Paternal Grandmother     Heart disease Father     Heart disease Sister     Hypertension Sister     No Known Problems Daughter     Clotting disorder Paternal Grandfather     Heart disease Brother     Heart murmur Brother     No Known Problems Son     No Known Problems Son     Breast cancer Other          Medications have been verified  Objective   /67 (BP Location: Right arm)   Pulse 93   Temp 98 2 °F (36 8 °C)   Resp 20   Ht 5' (1 524 m)   Wt 67 6 kg (149 lb)   SpO2 96%   BMI 29 10 kg/m²        Physical Exam     Physical Exam  Constitutional:       General: She is not in acute distress  Appearance: Normal appearance  She is well-developed  She is not diaphoretic  HENT:      Head: Normocephalic and atraumatic  Right Ear: Hearing, tympanic membrane, ear canal and external ear normal       Left Ear: Hearing, tympanic membrane, ear canal and external ear normal       Nose: Rhinorrhea present  Mouth/Throat:      Pharynx: Uvula midline  Cardiovascular:      Rate and Rhythm: Normal rate and regular rhythm  Heart sounds: Normal heart sounds  Pulmonary:      Effort: Pulmonary effort is normal  No respiratory distress  Breath sounds: Normal breath sounds  No stridor  No wheezing, rhonchi or rales  Chest:      Chest wall: No tenderness  Musculoskeletal:      Cervical back: Normal range of motion and neck supple  Lymphadenopathy:      Cervical: Cervical adenopathy present  Neurological:      Mental Status: She is alert         paxlovid discussed with patient but patient declined taking this medication at this time

## 2022-09-21 NOTE — PATIENT INSTRUCTIONS
COVID    Follow up with PCP in 3-5 days  Proceed to  ER if symptoms worsen  [address]    Your healthcare provider and/or public health staff have evaluated you and have determined that you do not need to remain in the hospital at this time  At this time you can be isolated at home where you will be monitored by staff from your local or state health department  You should carefully follow the prevention and isolation steps below until a healthcare provider or local or state health department says that you can return to your normal activities  Stay home except to get medical care    People who are mildly ill with COVID-19 are able to isolate at home during their illness  You should restrict activities outside your home, except for getting medical care  Do not go to work, school, or public areas  Avoid using public transportation, ride-sharing, or taxis  Separate yourself from other people and animals in your home    People: As much as possible, you should stay in a specific room and away from other people in your home  Also, you should use a separate bathroom, if available  Animals: You should restrict contact with pets and other animals while you are sick with COVID-19, just like you would around other people  Although there have not been reports of pets or other animals becoming sick with COVID-19, it is still recommended that people sick with COVID-19 limit contact with animals until more information is known about the virus  When possible, have another member of your household care for your animals while you are sick  If you are sick with COVID-19, avoid contact with your pet, including petting, snuggling, being kissed or licked, and sharing food  If you must care for your pet or be around animals while you are sick, wash your hands before and after you interact with pets and wear a facemask  See COVID-19 and Animals for more information      Call ahead before visiting your doctor    If you have a medical appointment, call the healthcare provider and tell them that you have or may have COVID-19  This will help the healthcare providers office take steps to keep other people from getting infected or exposed  Wear a facemask    You should wear a facemask when you are around other people (e g , sharing a room or vehicle) or pets and before you enter a healthcare providers office  If you are not able to wear a facemask (for example, because it causes trouble breathing), then people who live with you should not stay in the same room with you, or they should wear a facemask if they enter your room  Cover your coughs and sneezes    Cover your mouth and nose with a tissue when you cough or sneeze  Throw used tissues in a lined trash can  Immediately wash your hands with soap and water for at least 20 seconds or, if soap and water are not available, clean your hands with an alcohol-based hand  that contains at least 60% alcohol  Clean your hands often    Wash your hands often with soap and water for at least 20 seconds, especially after blowing your nose, coughing, or sneezing; going to the bathroom; and before eating or preparing food  If soap and water are not readily available, use an alcohol-based hand  with at least 60% alcohol, covering all surfaces of your hands and rubbing them together until they feel dry  Soap and water are the best option if hands are visibly dirty  Avoid touching your eyes, nose, and mouth with unwashed hands  Avoid sharing personal household items    You should not share dishes, drinking glasses, cups, eating utensils, towels, or bedding with other people or pets in your home  After using these items, they should be washed thoroughly with soap and water  Clean all high-touch surfaces everyday    High touch surfaces include counters, tabletops, doorknobs, bathroom fixtures, toilets, phones, keyboards, tablets, and bedside tables  Also, clean any surfaces that may have blood, stool, or body fluids on them  Use a household cleaning spray or wipe, according to the label instructions  Labels contain instructions for safe and effective use of the cleaning product including precautions you should take when applying the product, such as wearing gloves and making sure you have good ventilation during use of the product  Monitor your symptoms    Seek prompt medical attention if your illness is worsening (e g , difficulty breathing)  Before seeking care, call your healthcare provider and tell them that you have, or are being evaluated for, COVID-19  Put on a facemask before you enter the facility  These steps will help the healthcare providers office to keep other people in the office or waiting room from getting infected or exposed  Ask your healthcare provider to call the local or [facility] health department  Persons who are placed under active monitoring or facilitated self-monitoring should follow instructions provided by their local health department or occupational health professionals, as appropriate  If you have a medical emergency and need to call 911, notify the dispatch personnel that you have, or are being evaluated for COVID-19  If possible, put on a facemask before emergency medical services arrive  Discontinuing home isolation    Patients with confirmed COVID-19 should remain under home isolation precautions until the following conditions are met:    They have had no fever for at least 24 hours (that is one full day of no fever without the use medicine that reduces fevers)  AND  other symptoms have improved (for example, when their cough or shortness of breath have improved)  AND  If had mild or moderate illness, at least 10 days have passed since their symptoms first appeared or if severe illness (needed oxygen) or immunosuppressed, at least 20 days have passed since symptoms first appeared  Patients with confirmed COVID-19 should also notify close contacts (including their workplace) and ask that they self-quarantine  Currently, close contact is defined as being within 6 feet for 15 minutes or more from the period 24 hours starting 48 hours before symptom onset to the time at which the patient went into isolation  Close contacts of patients diagnosed with COVID-19 should be instructed by the patient to self-quarantine for 14 days from the last time of their last contact with the patient       Source: [employer]

## 2022-09-22 ENCOUNTER — HOSPITAL ENCOUNTER (EMERGENCY)
Facility: HOSPITAL | Age: 57
Discharge: HOME | End: 2022-09-22
Attending: EMERGENCY MEDICINE
Payer: COMMERCIAL

## 2022-09-22 ENCOUNTER — APPOINTMENT (OUTPATIENT)
Dept: RADIOLOGY | Facility: HOSPITAL | Age: 57
End: 2022-09-22
Payer: COMMERCIAL

## 2022-09-22 VITALS
DIASTOLIC BLOOD PRESSURE: 63 MMHG | RESPIRATION RATE: 20 BRPM | TEMPERATURE: 98.6 F | HEART RATE: 78 BPM | OXYGEN SATURATION: 97 % | SYSTOLIC BLOOD PRESSURE: 136 MMHG

## 2022-09-22 DIAGNOSIS — U07.1 COVID: Primary | ICD-10-CM

## 2022-09-22 PROCEDURE — 99285 EMERGENCY DEPT VISIT HI MDM: CPT | Performed by: EMERGENCY MEDICINE

## 2022-09-22 PROCEDURE — 71045 X-RAY EXAM CHEST 1 VIEW: CPT

## 2022-09-22 PROCEDURE — 93005 ELECTROCARDIOGRAM TRACING: CPT

## 2022-09-22 PROCEDURE — 99284 EMERGENCY DEPT VISIT MOD MDM: CPT

## 2022-09-22 RX ORDER — ONDANSETRON 4 MG/1
8 TABLET, ORALLY DISINTEGRATING ORAL ONCE
Status: COMPLETED | OUTPATIENT
Start: 2022-09-22 | End: 2022-09-22

## 2022-09-22 RX ORDER — ONDANSETRON 4 MG/1
8 TABLET, ORALLY DISINTEGRATING ORAL EVERY 8 HOURS PRN
Qty: 10 TABLET | Refills: 3 | Status: SHIPPED | OUTPATIENT
Start: 2022-09-22

## 2022-09-22 RX ORDER — OXYCODONE HYDROCHLORIDE 5 MG/1
5 CAPSULE ORAL EVERY 6 HOURS PRN
Qty: 5 CAPSULE | Refills: 0 | Status: SHIPPED | OUTPATIENT
Start: 2022-09-22

## 2022-09-22 RX ORDER — BENZONATATE 100 MG/1
100 CAPSULE ORAL ONCE
Status: COMPLETED | OUTPATIENT
Start: 2022-09-22 | End: 2022-09-22

## 2022-09-22 RX ORDER — NIRMATRELVIR AND RITONAVIR 300-100 MG
3 KIT ORAL 2 TIMES DAILY
Qty: 30 TABLET | Refills: 0 | Status: SHIPPED | OUTPATIENT
Start: 2022-09-22 | End: 2022-09-27

## 2022-09-22 RX ORDER — OXYCODONE HYDROCHLORIDE 5 MG/1
5 TABLET ORAL ONCE
Status: COMPLETED | OUTPATIENT
Start: 2022-09-22 | End: 2022-09-22

## 2022-09-22 RX ORDER — BENZONATATE 100 MG/1
100 CAPSULE ORAL 3 TIMES DAILY PRN
Qty: 20 CAPSULE | Refills: 3 | Status: SHIPPED | OUTPATIENT
Start: 2022-09-22

## 2022-09-22 RX ADMIN — ONDANSETRON 8 MG: 4 TABLET, ORALLY DISINTEGRATING ORAL at 11:56

## 2022-09-22 RX ADMIN — BENZONATATE 100 MG: 100 CAPSULE ORAL at 11:56

## 2022-09-22 RX ADMIN — OXYCODONE HYDROCHLORIDE 5 MG: 5 TABLET ORAL at 11:56

## 2022-09-22 NOTE — Clinical Note
Carlos Pulido was seen and treated in our emergency department on 9/22/2022  Diagnosis:     Jaret Van  may return to work on return date  She may return on this date: 09/29/2022         If you have any questions or concerns, please don't hesitate to call        Colleen Fontana MD    ______________________________           _______________          _______________  Hospital Representative                              Date                                Time

## 2022-09-22 NOTE — DISCHARGE INSTRUCTIONS
Diagnosis: covid     - expect to feel  like this- body aches/ fatigue- no energy  for at least next week     - please keep well hydrated    - for body aches- would start with over the counter daily generic tylenol 500 mg every 4 hrs     - only as needed for severe pain- oxycodone- take one tablet only as needed     - for nausea-  zofran 2 tablets dissolve in the mouth  every 6-8 hrs as needed     - for cough - tessalon - 1 capsule 3 times per day  as needed    - paxlovid- for covid- 1 packet- consists of 3 tablets 2 times a day for the next 5 days    - do not take your metoprolol  when you are taking the paxlovid     - after the paxlovid is finished- approximately 1-2  percent of people can experience a brief rebound of covid symptoms- that should resolve on there own     - please return to  the er for any increasing shortness of breath -- in which you can not do your daily activities because you are too short of breath -- need to be as active as possible  in doing daily activities

## 2022-09-22 NOTE — ED PROCEDURE NOTE
PROCEDURE  ECG 12 Lead Documentation Only    Date/Time: 9/22/2022 12:38 PM  Performed by: Lisa Schneider MD  Authorized by: Lisa Schneider MD     Indications / Diagnosis:  Cp/  ECG reviewed by me, the ED Provider: yes    Patient location:  ED and bedside  Previous ECG:     Previous ECG:  Unavailable    Comparison to cardiac monitor: Yes    Interpretation:     Interpretation: non-specific    Rate:     ECG rate:  61    ECG rate assessment: normal    Rhythm:     Rhythm: sinus rhythm    Ectopy:     Ectopy: none    QRS:     QRS axis:  Normal    QRS intervals:  Normal  Conduction:     Conduction: normal    ST segments:     ST segments:  Normal  T waves:     T waves: flattening      Flattening:  AVL, III, aVF and V1  Other findings:     Other findings: U wave    Comments:      No ecg signs of ischemia/ injury / r heart strain / jak/pericarditis         Lisa Schneider MD  09/22/22 1325

## 2022-09-23 LAB
ATRIAL RATE: 61 BPM
P AXIS: 28 DEGREES
PR INTERVAL: 158 MS
QRS AXIS: 22 DEGREES
QRSD INTERVAL: 88 MS
QT INTERVAL: 438 MS
QTC INTERVAL: 440 MS
T WAVE AXIS: 18 DEGREES
VENTRICULAR RATE: 61 BPM

## 2022-09-23 PROCEDURE — 93010 ELECTROCARDIOGRAM REPORT: CPT | Performed by: INTERNAL MEDICINE

## 2022-09-24 NOTE — ED PROVIDER NOTES
History  Chief Complaint   Patient presents with    Generalized Body Aches     Pt went to urgent care yesterday for body aches and tested covid +, pt was unable to sleep due to body aches  Pt also c/o cough and sore throat     55 yr female  Had covid x 1 in 11/21--started yesterday with  Fevers/ chill/s body aches pain-- low back pain -- with nasal congestion/sore throat/ cough -- sob- yesterday had several hrs of sharp ant cp at rest -- not pleuritic- no hx of vte- tested pos for covid yesterday       History provided by:  Patient  Generalized Body Aches  Associated symptoms: chest pain, congestion, cough, fatigue, fever, myalgias, shortness of breath and sore throat    Associated symptoms: no ear pain, no headaches, no rhinorrhea and no wheezing        Prior to Admission Medications   Prescriptions Last Dose Informant Patient Reported? Taking?    DULoxetine (CYMBALTA) 20 mg capsule  Self No No   Sig: Take 1 capsule (20 mg total) by mouth daily   aspirin 81 mg chewable tablet  Self No No   Sig: Chew 1 tablet (81 mg total) daily   atorvastatin (LIPITOR) 40 mg tablet  Self No No   Sig: TAKE 1 TABLET BY MOUTH EVERY DAY WITH DINNER   cyclobenzaprine (FLEXERIL) 10 mg tablet   No No   Sig: Take 1 tablet (10 mg total) by mouth 3 (three) times a day for 5 days   hydrochlorothiazide (HYDRODIURIL) 25 mg tablet  Self No No   Sig: TAKE 1 TABLET BY MOUTH EVERY DAY   meloxicam (Mobic) 15 mg tablet   No No   Sig: Take 1 tablet (15 mg total) by mouth daily   methylPREDNISolone 4 MG tablet therapy pack   No No   Sig: Use as directed on package   metoprolol tartrate (LOPRESSOR) 25 mg tablet  Self No No   Sig: TAKE 1 TABLET (25 MG TOTAL) BY MOUTH EVERY 12 (TWELVE) HOURS   ondansetron (ZOFRAN) 4 mg tablet   No No   Sig: Take 1 tablet (4 mg total) by mouth every 8 (eight) hours as needed for nausea or vomiting   pantoprazole (PROTONIX) 40 mg tablet   No No   Sig: Take 1 tablet (40 mg total) by mouth daily   Patient not taking: No sig reported      Facility-Administered Medications: None       Past Medical History:   Diagnosis Date    Fibroids     History of ovarian cyst     Hyperlipidemia     Hypertension     Migraine     Thyroid nodule        Past Surgical History:   Procedure Laterality Date    ENDOMETRIAL BIOPSY      W/o cervical dilation  Resolved 9/30/2014      TONSILLECTOMY      Age 6      TUBAL LIGATION         Family History   Problem Relation Age of Onset    Arthritis Mother     Diverticulitis Mother     Heart disease Mother     Hypertension Mother     Migraines Mother     Thyroid disease Mother     Arthritis Maternal Grandmother     Diabetes Maternal Grandmother     Heart disease Maternal Grandmother     Varicose Veins Maternal Grandmother     Heart attack Maternal Grandmother     Colon cancer Maternal Grandfather 58    Diabetes Paternal Grandmother     Heart disease Paternal Grandmother     Heart attack Paternal Grandmother     Heart disease Father     Heart disease Sister     Hypertension Sister     No Known Problems Daughter     Clotting disorder Paternal Grandfather     Heart disease Brother     Heart murmur Brother     No Known Problems Son     No Known Problems Son     Breast cancer Other      I have reviewed and agree with the history as documented  E-Cigarette/Vaping    E-Cigarette Use Never User      E-Cigarette/Vaping Substances    Nicotine No     THC No     CBD No     Flavoring No     Other No     Unknown No      Social History     Tobacco Use    Smoking status: Never Smoker    Smokeless tobacco: Never Used   Vaping Use    Vaping Use: Never used   Substance Use Topics    Alcohol use: Yes     Comment: occasional    Drug use: Never       Review of Systems   Constitutional: Positive for activity change, chills, fatigue and fever  Negative for appetite change, diaphoresis and unexpected weight change  HENT: Positive for congestion and sore throat   Negative for dental problem, drooling, ear discharge, ear pain, facial swelling, hearing loss, mouth sores, nosebleeds, postnasal drip, rhinorrhea, sinus pressure, sinus pain, sneezing, tinnitus, trouble swallowing and voice change  Eyes: Negative  Respiratory: Positive for cough and shortness of breath  Negative for apnea, choking, chest tightness, wheezing and stridor  Cardiovascular: Positive for chest pain  Negative for palpitations and leg swelling  Gastrointestinal: Negative  Endocrine: Negative  Genitourinary: Negative  Musculoskeletal: Positive for back pain and myalgias  Negative for arthralgias, gait problem, joint swelling, neck pain and neck stiffness  Skin: Negative  Allergic/Immunologic: Negative  Neurological: Positive for light-headedness  Negative for dizziness, tremors, seizures, syncope, facial asymmetry, speech difficulty, weakness, numbness and headaches  Hematological: Negative  Psychiatric/Behavioral: Negative  Physical Exam  Physical Exam  Vitals and nursing note reviewed  Constitutional:       General: She is not in acute distress  Appearance: Normal appearance  She is not ill-appearing, toxic-appearing or diaphoretic  Comments: avss-- pulse ox 97 % on ra- interpretation is normal- no intervention    HENT:      Head: Normocephalic and atraumatic  Right Ear: Tympanic membrane, ear canal and external ear normal  There is no impacted cerumen  Left Ear: Tympanic membrane, ear canal and external ear normal  There is no impacted cerumen  Nose: Nose normal  No congestion or rhinorrhea  Mouth/Throat:      Mouth: Mucous membranes are moist       Pharynx: Oropharynx is clear  No oropharyngeal exudate or posterior oropharyngeal erythema  Eyes:      General: No scleral icterus  Right eye: No discharge  Left eye: No discharge  Extraocular Movements: Extraocular movements intact        Conjunctiva/sclera: Conjunctivae normal       Pupils: Pupils are equal, round, and reactive to light  Comments: Mm pink   Neck:      Vascular: No carotid bruit  Comments: No pmt c/t/l/s spine   Cardiovascular:      Rate and Rhythm: Normal rate and regular rhythm  Pulses: Normal pulses  Heart sounds: Normal heart sounds  No murmur heard  No friction rub  No gallop  Pulmonary:      Effort: Pulmonary effort is normal  No respiratory distress  Breath sounds: Normal breath sounds  No stridor  No wheezing, rhonchi or rales  Chest:      Chest wall: No tenderness  Abdominal:      General: Bowel sounds are normal  There is no distension  Palpations: Abdomen is soft  There is no mass  Tenderness: There is no abdominal tenderness  There is no right CVA tenderness, left CVA tenderness, guarding or rebound  Hernia: No hernia is present  Comments: Soft nt/nd- no hsm- no cva tenderness- no ascites- no peritoneal signs    Musculoskeletal:         General: No swelling, tenderness, deformity or signs of injury  Normal range of motion  Cervical back: Normal range of motion and neck supple  No rigidity or tenderness  Right lower leg: No edema  Left lower leg: No edema  Comments: Equal bilateral radial/dp pulses- no ble edema/calf tenderness/asym/ erythema   Lymphadenopathy:      Cervical: No cervical adenopathy  Skin:     General: Skin is warm  Capillary Refill: Capillary refill takes less than 2 seconds  Coloration: Skin is not jaundiced or pale  Findings: No bruising, erythema, lesion or rash  Neurological:      General: No focal deficit present  Mental Status: She is alert and oriented to person, place, and time  Mental status is at baseline  Cranial Nerves: No cranial nerve deficit  Sensory: No sensory deficit  Motor: No weakness        Coordination: Coordination normal       Gait: Gait normal       Comments: Normal non focal neuro exam    Psychiatric:         Mood and Affect: Mood normal          Behavior: Behavior normal          Thought Content: Thought content normal          Judgment: Judgment normal          Vital Signs  ED Triage Vitals [09/22/22 0954]   Temperature Pulse Respirations Blood Pressure SpO2   98 6 °F (37 °C) 78 20 136/63 97 %      Temp Source Heart Rate Source Patient Position - Orthostatic VS BP Location FiO2 (%)   Oral Monitor Lying Right arm --      Pain Score       --           Vitals:    09/22/22 0954   BP: 136/63   Pulse: 78   Patient Position - Orthostatic VS: Lying         Visual Acuity      ED Medications  Medications   ondansetron (ZOFRAN-ODT) dispersible tablet 8 mg (8 mg Oral Given 9/22/22 1156)   oxyCODONE (ROXICODONE) IR tablet 5 mg (5 mg Oral Given 9/22/22 1156)   benzonatate (TESSALON PERLES) capsule 100 mg (100 mg Oral Given 9/22/22 1156)       Diagnostic Studies  Results Reviewed     None                 XR chest 1 view portable   Final Result by Dakota Millard MD (09/22 1209)      Possible mild infectious/inflammatory changes of the lung bases versus subsegmental atelectasis  The lungs are otherwise clear                    Workstation performed: [id number]                    Procedures  Procedures         ED Course  ED Course as of 09/24/22 1045   Thu Sep 22, 2022   1235 Cxr portable- compared to previous  11/21- no sign changes- no change in mediastinum/cardiac silhouette- no free/sq air- no infiltrate/ ptx/ pulm edema/ pleural effusion   1323 Er md note- pt re-evaluated- by er md- feels improved with symptoms - will attempt po challenge and ambulation and then d/c--    1454 Er md note- pt- re-evaluated- feels improved- tolerating  liquids- crackers- joselin to ambulate  in er- discussed paxlovid  with pt and pt is interested-- will review  current meds first - pt is unvaccinated against covid   1456 Er md note- pt is no longer taking  cymbalta/ flexeril/ lipitor   1500 Er md  medical decision making note- based on h and p-  testing- apryl perez clinical suspicion of any serious cause of cp yesterday -- acs/ scad/ vte/ tad is low-- will d/c                                              MDM    Disposition  Final diagnoses:   COVID     Time reflects when diagnosis was documented in both MDM as applicable and the Disposition within this note     Time User Action Codes Description Comment    9/22/2022  3:01 PM Migdalia Hwang Add [U07 1] [facility]\""       ED Disposition     ED Disposition   Discharge    Condition   Stable    Date/Time   Thu Sep 22, 2022  3:01 PM    2401 Elissa Cruzd discharge to home/self care                 Follow-up Information    None         Discharge Medication List as of 9/22/2022  3:12 PM      START taking these medications    Details   benzonatate (TESSALON PERLES) 100 mg capsule Take 1 capsule (100 mg total) by mouth 3 (three) times a day as needed for cough for up to 20 doses, Starting Thu 9/22/2022, Print      nirmatrelvir & ritonavir (Paxlovid, 300/100,) tablet therapy pack Take 3 tablets by mouth 2 (two) times a day for 5 days Take 2 nirmatrelvir tablets + 1 ritonavir tablet together per dose, Starting Thu 9/22/2022, Until Tue 9/27/2022, Print      ondansetron (Zofran ODT) 4 mg disintegrating tablet Take 2 tablets (8 mg total) by mouth every 8 (eight) hours as needed for nausea or vomiting for up to 10 doses, Starting Thu 9/22/2022, Print      oxyCODONE (OXY-IR) 5 MG capsule Take 1 capsule (5 mg total) by mouth every 6 (six) hours as needed for severe pain for up to 5 doses Can substitute oxycodone tablets for capsules only as needed for severe pain Max Daily Amount: 20 mg, Starting Thu 9/22/2022, Normal         CONTINUE these medications which have NOT CHANGED    Details   aspirin 81 mg chewable tablet Chew 1 tablet (81 mg total) daily, Starting Wed 12/8/2021, Normal      atorvastatin (LIPITOR) 40 mg tablet TAKE 1 TABLET BY MOUTH EVERY DAY WITH DINNER, Normal      cyclobenzaprine (FLEXERIL) 10 mg tablet Take 1 tablet (10 mg total) by mouth 3 (three) times a day for 5 days, Starting Sun 8/7/2022, Until Fri 8/12/2022, Normal      DULoxetine (CYMBALTA) 20 mg capsule Take 1 capsule (20 mg total) by mouth daily, Starting Tue 6/1/2021, Normal      hydrochlorothiazide (HYDRODIURIL) 25 mg tablet TAKE 1 TABLET BY MOUTH EVERY DAY, Normal      meloxicam (Mobic) 15 mg tablet Take 1 tablet (15 mg total) by mouth daily, Starting Sat 8/27/2022, Normal      methylPREDNISolone 4 MG tablet therapy pack Use as directed on package, Normal      metoprolol tartrate (LOPRESSOR) 25 mg tablet TAKE 1 TABLET (25 MG TOTAL) BY MOUTH EVERY 12 (TWELVE) HOURS, Starting Wed 12/22/2021, Normal      ondansetron (ZOFRAN) 4 mg tablet Take 1 tablet (4 mg total) by mouth every 8 (eight) hours as needed for nausea or vomiting, Starting Wed 9/21/2022, Normal      pantoprazole (PROTONIX) 40 mg tablet Take 1 tablet (40 mg total) by mouth daily, Starting Mon 12/13/2021, Normal             No discharge procedures on file      PDMP Review     None          ED Provider  Electronically Signed by           Umu Francis MD  09/24/22 3310

## 2022-10-24 ENCOUNTER — TELEPHONE (OUTPATIENT)
Dept: OBGYN CLINIC | Facility: HOSPITAL | Age: 57
End: 2022-10-24

## 2022-10-24 NOTE — TELEPHONE ENCOUNTER
Caller: Self    Doctor: Nesha Lazar    Reason for call: Patient left message on VM stating that she is unable to attend PT due to the amount on neck pain she is in      Call back#: [phone]

## 2022-11-08 ENCOUNTER — OFFICE VISIT (OUTPATIENT)
Dept: OBGYN CLINIC | Facility: MEDICAL CENTER | Age: 57
End: 2022-11-08

## 2022-11-08 VITALS
SYSTOLIC BLOOD PRESSURE: 158 MMHG | WEIGHT: 151 LBS | DIASTOLIC BLOOD PRESSURE: 93 MMHG | BODY MASS INDEX: 29.64 KG/M2 | HEIGHT: 60 IN | HEART RATE: 78 BPM

## 2022-11-08 DIAGNOSIS — M50.30 DEGENERATIVE CERVICAL DISC: ICD-10-CM
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
## 2022-11-08 DIAGNOSIS — M54.50 ACUTE MIDLINE LOW BACK PAIN WITHOUT SCIATICA: ICD-10-CM

## 2022-11-08 DIAGNOSIS — M47.816 FACET ARTHROPATHY, LUMBAR: ICD-10-CM

## 2022-11-08 DIAGNOSIS — M54.2 NECK PAIN: Primary | ICD-10-CM

## 2022-11-08 NOTE — LETTER
November 8, 2022     Patient: Bob Almaraz  YOB: 1965  Date of Visit: 11/8/2022      To Whom it May Concern:    Bob Almaraz is under my professional care  Sendycady Edouard was seen in my office on 11/8/2022  Work excuse today and tomorrow 11/9  If you have any questions or concerns, please don't hesitate to call           Sincerely,          Mark Ceravntes MD        CC: No Recipients
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
## 2022-11-08 NOTE — LETTER
November 8, 2022     Patient: Rebecca Solitario  YOB: 1965  Date of Visit: 11/8/2022      To Whom it May Concern:    Rebecca Solitario is under my professional care  Granger Stephen was seen in my office on 11/8/2022  Andrew Mitchell may work with restrictions:  Max lifting/pushing/pulling 10 lbs  Please allow 5-10 minute sitting break every hours as needed for pain  Patient may need random days off due to pain and medical treatment  If you have any questions or concerns, please don't hesitate to call           Sincerely,          Bernardo Vazquez MD        CC: No Recipients

## 2022-11-18 ENCOUNTER — TELEPHONE (OUTPATIENT)
Dept: OBGYN CLINIC | Facility: MEDICAL CENTER | Age: 57
End: 2022-11-18

## 2022-11-29 ENCOUNTER — OFFICE VISIT (OUTPATIENT)
Dept: FAMILY MEDICINE CLINIC | Facility: MEDICAL CENTER | Age: 57
End: 2022-11-29

## 2022-11-29 VITALS
OXYGEN SATURATION: 98 % | WEIGHT: 150 LBS | RESPIRATION RATE: 16 BRPM | BODY MASS INDEX: 29.45 KG/M2 | SYSTOLIC BLOOD PRESSURE: 160 MMHG | HEART RATE: 90 BPM | DIASTOLIC BLOOD PRESSURE: 88 MMHG | HEIGHT: 60 IN | TEMPERATURE: 99.9 F

## 2022-11-29 DIAGNOSIS — Z13.1 DIABETES MELLITUS SCREENING: ICD-10-CM

## 2022-11-29 DIAGNOSIS — R04.2 BLOOD-TINGED SPUTUM: ICD-10-CM

## 2022-11-29 DIAGNOSIS — I10 PRIMARY HYPERTENSION: ICD-10-CM

## 2022-11-29 DIAGNOSIS — Z23 ENCOUNTER FOR IMMUNIZATION: ICD-10-CM

## 2022-11-29 DIAGNOSIS — Z12.31 ENCOUNTER FOR SCREENING MAMMOGRAM FOR BREAST CANCER: ICD-10-CM

## 2022-11-29 DIAGNOSIS — F41.9 ANXIETY: ICD-10-CM

## 2022-11-29 DIAGNOSIS — R05.3 CHRONIC COUGH: ICD-10-CM

## 2022-11-29 DIAGNOSIS — R91.1 LUNG NODULE: ICD-10-CM

## 2022-11-29 DIAGNOSIS — Z13.29 THYROID DISORDER SCREEN: ICD-10-CM

## 2022-11-29 DIAGNOSIS — E04.1 THYROID NODULE: ICD-10-CM

## 2022-11-29 DIAGNOSIS — E78.2 MIXED HYPERLIPIDEMIA: ICD-10-CM

## 2022-11-29 DIAGNOSIS — Z76.89 ENCOUNTER TO ESTABLISH CARE WITH NEW DOCTOR: Primary | ICD-10-CM

## 2022-11-29 RX ORDER — DULOXETIN HYDROCHLORIDE 20 MG/1
20 CAPSULE, DELAYED RELEASE ORAL DAILY
Qty: 90 CAPSULE | Refills: 0 | Status: SHIPPED | OUTPATIENT
Start: 2022-11-29

## 2022-11-29 NOTE — PROGRESS NOTES
Assessment/Plan:    Influenza and Tdap vaccines updated today  Mammogram ordered  Colonoscopy up-to-date  Ultrasound thyroid ordered  Fasting blood work ordered  Restart Cymbalta 20 mg once daily, order placed  Advised patient to follow-up with cardiology  Follow-up in 3 months or sooner if needed  1  Encounter to establish care with new doctor  50-year-old female presents to establish care  She is followed by Cardiology for hypertension and hyperlipidemia  She reports compliance with her medications  She is interested in restarting duloxetine today for anxiety as a worked well for her in the past   She is agreeable to fasting blood work, vaccinations and mammogram       2  Encounter for immunization  Tdap and influenza vaccines updated today, tolerated well  - TDAP VACCINE GREATER THAN OR EQUAL TO 8YO IM  - influenza vaccine, quadrivalent, recombinant, PF, 0 5 mL, for patients 18 yr+ (FLUBLOK)    3  Encounter for screening mammogram for breast cancer  - Mammo screening bilateral w 3d & cad; Future    4  Primary hypertension  Blood pressure elevated today in office at 160/88  She reports compliance with metoprolol and hydrochlorothiazide daily  She is followed by Cardiology for hypertension and is aware she needs to call and schedule a follow-up visit with their office  Check labs  - Comprehensive metabolic panel; Future  - Protein / creatinine ratio, urine    5  Mixed hyperlipidemia  Currently on atorvastatin  Followed by cardiology  Check labs  - Lipid panel; Future    6  Anxiety  Restart duloxetine 20 mg daily   - DULoxetine (CYMBALTA) 20 mg capsule; Take 1 capsule (20 mg total) by mouth daily  Dispense: 90 capsule; Refill: 0    7  Thyroid nodule  History of thyroid nodule  No ultrasound of thyroid in chart  Check labs and ultrasound thyroid  - TSH, 3rd generation with Free T4 reflex; Future  - US thyroid; Future    8   Thyroid disorder screen  - TSH, 3rd generation with Free T4 reflex; Future    9  Diabetes mellitus screening  - Hemoglobin A1C; Future    10  Blood-tinged sputum  - XR chest pa & lateral; Future    11  Chronic cough  - XR chest pa & lateral; Future    12  Lung nodule  CT chest 07/2020 reveals 2 tiny right lung nodules, unchanged since May 2019  Patient is a nonsmoker  At that time reports show these are likely benign and no follow-up was needed  Subjective:      Patient ID: Shala Wing is a 65 y o  female  81-year-old female presents to establish care with new provider  She has hypertension  She is followed by Cardiology for this  She has hyperlipidemia  She is followed by Cardiology for this  She has not seen Cardiology since 01/2022 and is aware she needs to call and schedule follow-up appointment with their office  She has anxiety  She was previously taking Cymbalta however she ran out of the medication over a year ago since her last visit here and has not been taking it since however, she is interested in restarting it because it was working well for her anxiety which she reports is high right now  She has a history of thyroid nodule, no thyroid ultrasound in chart  She has a history of lung nodules, per last CT chest in 7/2020, it was advised no further work up/imaging was needed as these are benign and unchanged  She reports chronic cough x3 years, unchanged however reports she has been coughing up blood tinged sputum since last week  Denies chest pain, shortness a breath, fever  She is interested today in having updated blood work, mammogram, vaccinations  Fasting labs previously ordered have not been done  She is requesting and work excuse for today as she reports she had to call out of work for today's appointment        The following portions of the patient's history were reviewed and updated as appropriate: allergies, current medications, past medical history, past social history, past surgical history and problem list     Review of Systems   Constitutional: Negative  HENT: Negative  Eyes: Negative  Respiratory: Positive for cough (chronic x3 years, reports blood-tinged sputum x1 week  )  Negative for shortness of breath  Cardiovascular: Negative  Negative for chest pain, palpitations and leg swelling  Gastrointestinal: Negative  Endocrine: Negative  Genitourinary: Negative  Musculoskeletal: Negative  Skin: Negative  Allergic/Immunologic: Negative  Neurological: Negative  Hematological: Negative  Psychiatric/Behavioral: Negative  Objective:      /88 (BP Location: Left arm, Patient Position: Sitting, Cuff Size: Large)   Pulse 90   Temp 99 9 °F (37 7 °C)   Resp 16   Ht 5' (1 524 m)   Wt 68 kg (150 lb)   SpO2 98%   BMI 29 29 kg/m²          Physical Exam  Vitals and nursing note reviewed  Constitutional:       General: She is not in acute distress  Appearance: Normal appearance  She is not ill-appearing  HENT:      Head: Normocephalic and atraumatic  Nose: Nose normal       Mouth/Throat:      Mouth: Mucous membranes are moist       Pharynx: Oropharynx is clear  Eyes:      Conjunctiva/sclera: Conjunctivae normal       Pupils: Pupils are equal, round, and reactive to light  Cardiovascular:      Rate and Rhythm: Normal rate and regular rhythm  Pulses: Normal pulses  Heart sounds: Normal heart sounds  Pulmonary:      Effort: Pulmonary effort is normal  No respiratory distress  Breath sounds: Normal breath sounds  No stridor  No wheezing, rhonchi or rales  Abdominal:      General: Abdomen is flat  Bowel sounds are normal       Palpations: Abdomen is soft  Musculoskeletal:         General: Normal range of motion  Cervical back: Normal range of motion and neck supple  Skin:     General: Skin is warm and dry  Neurological:      General: No focal deficit present  Mental Status: She is alert and oriented to person, place, and time   Mental status is at baseline  Psychiatric:         Mood and Affect: Mood normal          Behavior: Behavior normal          Thought Content:  Thought content normal                     Paradise Baker

## 2022-12-16 ENCOUNTER — TELEPHONE (OUTPATIENT)
Dept: OBGYN CLINIC | Facility: MEDICAL CENTER | Age: 57
End: 2022-12-16

## 2022-12-16 ENCOUNTER — TELEPHONE (OUTPATIENT)
Dept: OBGYN CLINIC | Facility: HOSPITAL | Age: 57
End: 2022-12-16
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
## 2022-12-16 NOTE — TELEPHONE ENCOUNTER
Caller: Patient     Doctor: Adi Ball     Reason for call: Patient returned your call    Call back#: [phone]

## 2022-12-20 ENCOUNTER — TELEPHONE (OUTPATIENT)
Dept: OBGYN CLINIC | Facility: MEDICAL CENTER | Age: 57
End: 2022-12-20

## 2022-12-20 DIAGNOSIS — I10 ESSENTIAL HYPERTENSION: ICD-10-CM

## 2022-12-20 DIAGNOSIS — R07.9 CHEST PAIN, UNSPECIFIED TYPE: ICD-10-CM

## 2022-12-20 DIAGNOSIS — I16.0 HYPERTENSIVE URGENCY: ICD-10-CM

## 2022-12-20 DIAGNOSIS — E78.00 ELEVATED CHOLESTEROL: ICD-10-CM

## 2022-12-20 RX ORDER — ASPIRIN 81 MG/1
81 TABLET, CHEWABLE ORAL DAILY
Qty: 90 TABLET | Refills: 0 | Status: SHIPPED | OUTPATIENT
Start: 2022-12-20

## 2022-12-20 RX ORDER — ATORVASTATIN CALCIUM 40 MG/1
40 TABLET, FILM COATED ORAL
Qty: 90 TABLET | Refills: 0 | Status: SHIPPED | OUTPATIENT
Start: 2022-12-20

## 2022-12-21 RX ORDER — HYDROCHLOROTHIAZIDE 25 MG/1
25 TABLET ORAL DAILY
Qty: 90 TABLET | Refills: 0 | Status: SHIPPED | OUTPATIENT
Start: 2022-12-21

## 2022-12-22 ENCOUNTER — TELEPHONE (OUTPATIENT)
Dept: OBGYN CLINIC | Facility: MEDICAL CENTER | Age: 57
End: 2022-12-22

## 2022-12-27 ENCOUNTER — TELEPHONE (OUTPATIENT)
Dept: OBGYN CLINIC | Facility: MEDICAL CENTER | Age: 57
End: 2022-12-27

## 2022-12-27 ENCOUNTER — TELEPHONE (OUTPATIENT)
Dept: OBGYN CLINIC | Facility: HOSPITAL | Age: 57
End: 2022-12-27

## 2022-12-27 NOTE — TELEPHONE ENCOUNTER
Caller: ZAK SOUZA  [facility] Prior Auth    Doctor: Sonali Valentino    Reason for call: Patient has MRI scheduled for tomorrow 12/28  Insurance requiring 6 weeks of conservative therapy  Dept wants OK to CXL MRI      Call back#: [phone]
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
## 2022-12-31 DIAGNOSIS — I16.0 HYPERTENSIVE URGENCY: ICD-10-CM

## 2023-01-17 ENCOUNTER — TELEPHONE (OUTPATIENT)
Dept: OBGYN CLINIC | Facility: MEDICAL CENTER | Age: 58
End: 2023-01-17

## 2023-01-17 ENCOUNTER — TELEPHONE (OUTPATIENT)
Dept: OBGYN CLINIC | Facility: HOSPITAL | Age: 58
End: 2023-01-17

## 2023-01-17 NOTE — TELEPHONE ENCOUNTER
Caller: Ronan Ling from [employer]    Doctor: Dr Omega Garcia    Reason for call: Ronan Ling is calling to verify clinical information which needs to be completed over the phone for her Short term disability claim    She is asking to speak to clinical team     Call back#: [phone]

## 2023-01-18 ENCOUNTER — TELEPHONE (OUTPATIENT)
Dept: OBGYN CLINIC | Facility: MEDICAL CENTER | Age: 58
End: 2023-01-18
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
## 2023-01-18 NOTE — TELEPHONE ENCOUNTER
Caller: Patient     Doctor: Edwin Kuo     Reason for call: Patient returning call     Call back#: [phone]

## 2023-01-18 NOTE — TELEPHONE ENCOUNTER
Caller: Patient     Doctor: Ave Abernathy     Reason for call: Patient returning call     Call back#: [phone]

## 2023-01-20 ENCOUNTER — TELEPHONE (OUTPATIENT)
Dept: OBGYN CLINIC | Facility: HOSPITAL | Age: 58
End: 2023-01-20
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
## 2023-02-07 ENCOUNTER — TELEPHONE (OUTPATIENT)
Dept: OBGYN CLINIC | Facility: HOSPITAL | Age: 58
End: 2023-02-07

## 2023-02-07 ENCOUNTER — OFFICE VISIT (OUTPATIENT)
Dept: OBGYN CLINIC | Facility: MEDICAL CENTER | Age: 58
End: 2023-02-07

## 2023-02-07 VITALS
WEIGHT: 152 LBS | BODY MASS INDEX: 29.84 KG/M2 | HEIGHT: 60 IN | HEART RATE: 76 BPM | DIASTOLIC BLOOD PRESSURE: 70 MMHG | SYSTOLIC BLOOD PRESSURE: 127 MMHG

## 2023-02-07 DIAGNOSIS — M54.50 ACUTE MIDLINE LOW BACK PAIN WITHOUT SCIATICA: ICD-10-CM

## 2023-02-07 DIAGNOSIS — M54.2 NECK PAIN: Primary | ICD-10-CM
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
## 2023-02-07 DIAGNOSIS — M47.816 FACET ARTHROPATHY, LUMBAR: ICD-10-CM

## 2023-02-07 DIAGNOSIS — M50.30 DEGENERATIVE CERVICAL DISC: ICD-10-CM

## 2023-02-07 RX ORDER — MELOXICAM 15 MG/1
15 TABLET ORAL DAILY
Qty: 30 TABLET | Refills: 1 | Status: SHIPPED | OUTPATIENT
Start: 2023-02-07
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
## 2023-02-07 NOTE — TELEPHONE ENCOUNTER
Caller: Patient    Doctor: Dr Omega Garcia    Reason for call: Patient calling in asking about her Short Term Disability paperwork  Confirmed that it was received on 1/27/22 and usually takes 10-14 business days to complete      Call back#: [phone]

## 2023-02-07 NOTE — LETTER
February 7, 2023     Patient: Yi Polo  YOB: 1965  Date of Visit: 2/7/2023      To Whom it May Concern:    Yi Polo is under my professional care  Claude Carson was seen in my office on 2/7/2023  Claude Carson may work with restrictions:  Max lifting/pushing/pulling 5 lbs  Please allow 5-10 minute sitting break every hour as needed for pain  Patient may need random days off due to pain and medical treatment  Please accept this as a work excuse note 1/23-2/7 due to pain  If you have any questions or concerns, please don't hesitate to call           Sincerely,          Maggy Dimas MD        CC: No Recipients

## 2023-02-13 ENCOUNTER — VBI (OUTPATIENT)
Dept: ADMINISTRATIVE | Facility: OTHER | Age: 58
End: 2023-02-13

## 2023-02-14 ENCOUNTER — EVALUATION (OUTPATIENT)
Dept: PHYSICAL THERAPY | Facility: CLINIC | Age: 58
End: 2023-02-14
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
## 2023-02-14 DIAGNOSIS — M54.50 ACUTE MIDLINE LOW BACK PAIN WITHOUT SCIATICA: ICD-10-CM

## 2023-02-14 DIAGNOSIS — M50.30 DEGENERATIVE CERVICAL DISC: ICD-10-CM

## 2023-02-14 DIAGNOSIS — M54.2 NECK PAIN: Primary | ICD-10-CM

## 2023-02-14 DIAGNOSIS — M47.816 FACET ARTHROPATHY, LUMBAR: ICD-10-CM
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
## 2023-02-16 ENCOUNTER — OFFICE VISIT (OUTPATIENT)
Dept: PHYSICAL THERAPY | Facility: CLINIC | Age: 58
End: 2023-02-16

## 2023-02-16 DIAGNOSIS — M47.816 FACET ARTHROPATHY, LUMBAR: ICD-10-CM

## 2023-02-16 DIAGNOSIS — M54.2 NECK PAIN: Primary | ICD-10-CM

## 2023-02-16 DIAGNOSIS — M54.50 ACUTE MIDLINE LOW BACK PAIN WITHOUT SCIATICA: ICD-10-CM
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
## 2023-02-16 DIAGNOSIS — M50.30 DEGENERATIVE CERVICAL DISC: ICD-10-CM
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
## 2023-02-21 ENCOUNTER — OFFICE VISIT (OUTPATIENT)
Dept: PHYSICAL THERAPY | Facility: CLINIC | Age: 58
End: 2023-02-21
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
## 2023-02-21 DIAGNOSIS — M54.50 ACUTE MIDLINE LOW BACK PAIN WITHOUT SCIATICA: ICD-10-CM

## 2023-02-21 DIAGNOSIS — M54.2 NECK PAIN: Primary | ICD-10-CM

## 2023-02-21 DIAGNOSIS — M50.30 DEGENERATIVE CERVICAL DISC: ICD-10-CM

## 2023-02-21 DIAGNOSIS — M47.816 FACET ARTHROPATHY, LUMBAR: ICD-10-CM
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
## 2023-02-23 ENCOUNTER — APPOINTMENT (OUTPATIENT)
Dept: PHYSICAL THERAPY | Facility: CLINIC | Age: 58
End: 2023-02-23

## 2023-02-23 ENCOUNTER — TELEPHONE (OUTPATIENT)
Dept: OBGYN CLINIC | Facility: HOSPITAL | Age: 58
End: 2023-02-23

## 2023-02-23 NOTE — TELEPHONE ENCOUNTER
Caller: Patient    Doctor/Office: Dr Mendez Slider    CB#:       What needs to be faxed: Please fax Forms for [employer] dated 2/10/23  [employer] stating that they never received them      ATTN to: Isaias Berry    Fax#: [phone]

## 2023-02-27 ENCOUNTER — TELEPHONE (OUTPATIENT)
Dept: OBGYN CLINIC | Facility: HOSPITAL | Age: 58
End: 2023-02-27

## 2023-02-27 NOTE — TELEPHONE ENCOUNTER
Caller: patient    Doctor: Sonali Valentino    Reason for call: patient called asking for a new work note for her to be out of work  The pain in her neck in getting worse  She currently doing PT and scheduled her MRI's    She would like a call    Call back#: [phone]

## 2023-02-28 ENCOUNTER — OFFICE VISIT (OUTPATIENT)
Dept: PHYSICAL THERAPY | Facility: CLINIC | Age: 58
End: 2023-02-28

## 2023-02-28 DIAGNOSIS — M54.2 NECK PAIN: Primary | ICD-10-CM
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
## 2023-02-28 DIAGNOSIS — M50.30 DEGENERATIVE CERVICAL DISC: ICD-10-CM

## 2023-02-28 DIAGNOSIS — M47.816 FACET ARTHROPATHY, LUMBAR: ICD-10-CM

## 2023-02-28 DIAGNOSIS — M54.50 ACUTE MIDLINE LOW BACK PAIN WITHOUT SCIATICA: ICD-10-CM
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
## 2023-03-02 ENCOUNTER — APPOINTMENT (OUTPATIENT)
Dept: PHYSICAL THERAPY | Facility: CLINIC | Age: 58
End: 2023-03-02

## 2023-03-06 ENCOUNTER — APPOINTMENT (OUTPATIENT)
Dept: PHYSICAL THERAPY | Facility: CLINIC | Age: 58
End: 2023-03-06

## 2023-03-06 ENCOUNTER — HOSPITAL ENCOUNTER (OUTPATIENT)
Dept: RADIOLOGY | Facility: MEDICAL CENTER | Age: 58
Discharge: HOME | End: 2023-03-06

## 2023-03-06 ENCOUNTER — APPOINTMENT (OUTPATIENT)
Dept: RADIOLOGY | Facility: MEDICAL CENTER | Age: 58
End: 2023-03-06

## 2023-03-06 DIAGNOSIS — E04.1 THYROID NODULE: ICD-10-CM

## 2023-03-06 DIAGNOSIS — R05.3 CHRONIC COUGH: ICD-10-CM

## 2023-03-06 DIAGNOSIS — R04.2 BLOOD-TINGED SPUTUM: ICD-10-CM

## 2023-03-07 ENCOUNTER — TELEPHONE (OUTPATIENT)
Dept: OBGYN CLINIC | Facility: HOSPITAL | Age: 58
End: 2023-03-07

## 2023-03-07 ENCOUNTER — OFFICE VISIT (OUTPATIENT)
Dept: PHYSICAL THERAPY | Facility: CLINIC | Age: 58
End: 2023-03-07

## 2023-03-07 DIAGNOSIS — M54.50 ACUTE MIDLINE LOW BACK PAIN WITHOUT SCIATICA: ICD-10-CM
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
## 2023-03-07 DIAGNOSIS — M47.816 FACET ARTHROPATHY, LUMBAR: ICD-10-CM

## 2023-03-07 DIAGNOSIS — M50.30 DEGENERATIVE CERVICAL DISC: ICD-10-CM

## 2023-03-07 DIAGNOSIS — M54.2 NECK PAIN: Primary | ICD-10-CM
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
## 2023-03-07 NOTE — TELEPHONE ENCOUNTER
Caller: Patient    Doctor: Dr Sonali Valentino    Reason for call: Patient stating that she received call from Ortho  Warm transfer to surgery scheduler      Call back#: [phone]

## 2023-03-07 NOTE — TELEPHONE ENCOUNTER
Tried to call but no answer and no voicemail set up  Thoracic MRI was NOT approved by insurance  Spoke with Dr Tamayo Links he is okay just getting the lumbar and cervical MRI for right now  I cancelled the thoracic MRI

## 2023-03-09 ENCOUNTER — APPOINTMENT (OUTPATIENT)
Dept: PHYSICAL THERAPY | Facility: CLINIC | Age: 58
End: 2023-03-09

## 2023-03-09 NOTE — PROGRESS NOTES
"Daily Note     Today's date: 3/9/2023  Patient name: Sanam Meehan  : 1965  MRN: [id number]  Referring provider: Radhames Hoffman MD  Dx: No diagnosis found  Subjective: ***      Objective: See treatment diary below      Assessment: Tolerated treatment {Tolerated treatment :}   Patient {assessment:[id number]}      Plan: {PLAN:}     Diagnosis: Neck and LBP   Precautions: Chronicity of sxs   POC Expires:    Re-evaluation Date: 3/28   FOTO Scores/Date: 24 ()   Visit Count 1 2 3 4 5   Manuals 2/14 2/16 2/21 2/28 3/7   CS STM  EB NB EB EB   CS Distraction  EB NB EB EB   LS STM  EB NB EB EB   TS mobs   EB Gr 1-2 NB Gr 1 EB EB   LS Mobs   EB Gr 1-2  NB Gr 1 EB EB   CS Mobs         Ther Ex        UBE  3' fwd 5' fwd 4' fwd  2/2   Bike         DKTC  x20 x20     UT stretch  10x5\" B/L AROM when going L due to pain still had pt hold  Supine   10x5\" B/L AROM only to the L due to pain     Hip iso   Abd/add 20x3\" ea  Abd/add 20\"x3 ea     Ball rollouts      15x ea direction    Levator stretch        Doorway stretch        Rhomboid stretch        Posterior pelvic tilt  15x  15x     Cervical AROM    Rotations, sidebends x10 ea  Rotations, sidebends 20x    Neuro Re-Ed        Scap retractions 10x HEP    15x    Cervical retractions 10x HEP Supine 2x10 w/towel underneath head Supine 2x10 w/ towel underneath head     Cervical AROM 10x HEP flex/ext, side bending        LTR 10x HEP W/pball x15 ea side W/pball x15 ea side x10 ea side     TrA bracing 10x HEP 15x3\" w/pball  15x3\" wl pball     CS Snags         TB Rows     2x10 ytb    TB Pulldowns     20x ytb   B/L ER     20x ytb    Patient education      EB                                   Ther Act                                         Modalities                                                                 "

## 2023-03-13 ENCOUNTER — HOSPITAL ENCOUNTER (OUTPATIENT)
Dept: MRI IMAGING | Facility: HOSPITAL | Age: 58
Discharge: HOME | End: 2023-03-13
Attending: EMERGENCY MEDICINE

## 2023-03-13 ENCOUNTER — TELEPHONE (OUTPATIENT)
Dept: OBGYN CLINIC | Facility: HOSPITAL | Age: 58
End: 2023-03-13

## 2023-03-13 DIAGNOSIS — M50.30 OTHER CERVICAL DISC DEGENERATION, UNSPECIFIED CERVICAL REGION: ICD-10-CM

## 2023-03-13 DIAGNOSIS — M54.50 LOW BACK PAIN, UNSPECIFIED: ICD-10-CM

## 2023-03-13 DIAGNOSIS — M54.2 CERVICALGIA: ICD-10-CM

## 2023-03-13 DIAGNOSIS — M47.816 SPONDYLOSIS WITHOUT MYELOPATHY OR RADICULOPATHY, LUMBAR REGION: ICD-10-CM

## 2023-03-13 NOTE — TELEPHONE ENCOUNTER
Caller: Karyn from [facility] MRI    Doctor: Sonali Valentino    Reason for call: Jesusita  calling asking if the orders for the MRI for the lumbar and cervical spine be e-signed by Dr Sonali Valentino  Patient having lumbar MRI today and the cervical MRI tomorrow      Call back#: [phone]

## 2023-03-14 ENCOUNTER — APPOINTMENT (OUTPATIENT)
Dept: MRI IMAGING | Facility: HOSPITAL | Age: 58
End: 2023-03-14
Attending: EMERGENCY MEDICINE

## 2023-03-14 ENCOUNTER — HOSPITAL ENCOUNTER (OUTPATIENT)
Dept: MRI IMAGING | Facility: HOSPITAL | Age: 58
Discharge: HOME | End: 2023-03-14
Attending: EMERGENCY MEDICINE

## 2023-03-14 ENCOUNTER — APPOINTMENT (OUTPATIENT)
Dept: PHYSICAL THERAPY | Facility: CLINIC | Age: 58
End: 2023-03-14

## 2023-03-14 DIAGNOSIS — M50.30 OTHER CERVICAL DISC DEGENERATION, UNSPECIFIED CERVICAL REGION: ICD-10-CM

## 2023-03-14 DIAGNOSIS — M54.2 CERVICALGIA: ICD-10-CM

## 2023-03-14 DIAGNOSIS — M47.816 SPONDYLOSIS WITHOUT MYELOPATHY OR RADICULOPATHY, LUMBAR REGION: ICD-10-CM

## 2023-03-14 DIAGNOSIS — M54.50 LOW BACK PAIN, UNSPECIFIED: ICD-10-CM

## 2023-03-16 ENCOUNTER — OFFICE VISIT (OUTPATIENT)
Dept: OBGYN CLINIC | Facility: MEDICAL CENTER | Age: 58
End: 2023-03-16

## 2023-03-16 VITALS
WEIGHT: 147 LBS | HEART RATE: 65 BPM | DIASTOLIC BLOOD PRESSURE: 83 MMHG | HEIGHT: 60 IN | SYSTOLIC BLOOD PRESSURE: 153 MMHG | BODY MASS INDEX: 28.86 KG/M2
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
## 2023-03-16 DIAGNOSIS — M54.2 CHRONIC NECK PAIN: Primary | ICD-10-CM

## 2023-03-16 DIAGNOSIS — M47.816 FACET ARTHROPATHY, LUMBAR: ICD-10-CM

## 2023-03-16 DIAGNOSIS — M54.50 ACUTE MIDLINE LOW BACK PAIN WITHOUT SCIATICA: ICD-10-CM

## 2023-03-16 DIAGNOSIS — G89.29 CHRONIC NECK PAIN: Primary | ICD-10-CM

## 2023-03-16 DIAGNOSIS — M50.30 DEGENERATIVE CERVICAL DISC: ICD-10-CM

## 2023-03-16 NOTE — LETTER
March 16, 2023     Patient: Eliceo Mendoza  YOB: 1965  Date of Visit: 3/16/23      To Whom it May Concern:    Eliceo Mendoza is under my professional care  Work excuse until further notice  If you have any questions or concerns, please don't hesitate to call           Sincerely,          Evita Tamayo MD        CC: No Recipients
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
## 2023-03-17 ENCOUNTER — APPOINTMENT (OUTPATIENT)
Dept: PHYSICAL THERAPY | Facility: CLINIC | Age: 58
End: 2023-03-17

## 2023-03-20 ENCOUNTER — APPOINTMENT (OUTPATIENT)
Dept: PHYSICAL THERAPY | Facility: CLINIC | Age: 58
End: 2023-03-20

## 2023-03-23 ENCOUNTER — OFFICE VISIT (OUTPATIENT)
Dept: PHYSICAL THERAPY | Facility: CLINIC | Age: 58
End: 2023-03-23

## 2023-03-23 DIAGNOSIS — M50.30 DEGENERATIVE CERVICAL DISC: ICD-10-CM

## 2023-03-23 DIAGNOSIS — M54.2 NECK PAIN: Primary | ICD-10-CM
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
## 2023-03-23 DIAGNOSIS — M47.816 FACET ARTHROPATHY, LUMBAR: ICD-10-CM

## 2023-03-23 DIAGNOSIS — M54.50 ACUTE MIDLINE LOW BACK PAIN WITHOUT SCIATICA: ICD-10-CM
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
## 2023-03-27 ENCOUNTER — APPOINTMENT (OUTPATIENT)
Dept: PHYSICAL THERAPY | Facility: CLINIC | Age: 58
End: 2023-03-27

## 2023-03-30 ENCOUNTER — EVALUATION (OUTPATIENT)
Dept: PHYSICAL THERAPY | Facility: CLINIC | Age: 58
End: 2023-03-30

## 2023-03-30 DIAGNOSIS — M47.816 FACET ARTHROPATHY, LUMBAR: ICD-10-CM

## 2023-03-30 DIAGNOSIS — M50.30 DEGENERATIVE CERVICAL DISC: ICD-10-CM
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
## 2023-03-30 DIAGNOSIS — M54.50 ACUTE MIDLINE LOW BACK PAIN WITHOUT SCIATICA: ICD-10-CM

## 2023-03-30 DIAGNOSIS — M54.2 NECK PAIN: Primary | ICD-10-CM
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
## 2023-04-04 ENCOUNTER — OFFICE VISIT (OUTPATIENT)
Dept: PHYSICAL THERAPY | Facility: CLINIC | Age: 58
End: 2023-04-04

## 2023-04-04 DIAGNOSIS — M50.30 DEGENERATIVE CERVICAL DISC: ICD-10-CM

## 2023-04-04 DIAGNOSIS — M54.2 NECK PAIN: Primary | ICD-10-CM

## 2023-04-04 DIAGNOSIS — M47.816 FACET ARTHROPATHY, LUMBAR: ICD-10-CM
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
## 2023-04-04 DIAGNOSIS — M54.50 ACUTE MIDLINE LOW BACK PAIN WITHOUT SCIATICA: ICD-10-CM
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
## 2023-04-05 ENCOUNTER — HOSPITAL ENCOUNTER (OUTPATIENT)
Dept: RADIOLOGY | Facility: MEDICAL CENTER | Age: 58
Discharge: HOME | End: 2023-04-05

## 2023-04-05 VITALS — HEIGHT: 60 IN | BODY MASS INDEX: 28.86 KG/M2 | WEIGHT: 147 LBS

## 2023-04-05 DIAGNOSIS — Z12.31 ENCOUNTER FOR SCREENING MAMMOGRAM FOR BREAST CANCER: ICD-10-CM

## 2023-04-05 LAB
CREAT UR-MCNC: 70.1 MG/DL
PROT UR-MCNC: 6 MG/DL
PROT/CREAT UR: 0.09 MG/G{CREAT} (ref 0–0.1)

## 2023-04-06 ENCOUNTER — TELEPHONE (OUTPATIENT)
Dept: FAMILY MEDICINE CLINIC | Facility: MEDICAL CENTER | Age: 58
End: 2023-04-06

## 2023-04-06 ENCOUNTER — APPOINTMENT (OUTPATIENT)
Dept: PHYSICAL THERAPY | Facility: CLINIC | Age: 58
End: 2023-04-06

## 2023-04-06 DIAGNOSIS — E78.2 MIXED HYPERLIPIDEMIA: Primary | ICD-10-CM

## 2023-04-06 RX ORDER — EZETIMIBE 10 MG/1
10 TABLET ORAL DAILY
Qty: 90 TABLET | Refills: 1 | Status: SHIPPED | OUTPATIENT
Start: 2023-04-06 | End: 2024-03-31

## 2023-04-06 NOTE — TELEPHONE ENCOUNTER
----- Message from [address] [place] [address] sent at 4/6/2023 10:58 AM EDT -----  Please inform patient lipid levels are elevated, higher than prior labs which have not been done since 2020  If she currently taking the atorvastatin 40 mg daily? A1c is also elevated, in prediabetic range  Medication is not indicated at this time however, please follow healthy diet low in carbs and sugars  Thyroid function testing normal comprehensive panel overall unremarkable

## 2023-04-06 NOTE — TELEPHONE ENCOUNTER
Since she is taking the atorvastatin daily, I would recommend adding zetia to help bring the triglycerides down  Please let me know if she is ok with this and I will place order

## 2023-04-11 ENCOUNTER — APPOINTMENT (OUTPATIENT)
Dept: PHYSICAL THERAPY | Facility: CLINIC | Age: 58
End: 2023-04-11

## 2023-04-13 ENCOUNTER — APPOINTMENT (OUTPATIENT)
Dept: PHYSICAL THERAPY | Facility: CLINIC | Age: 58
End: 2023-04-13

## 2023-04-18 ENCOUNTER — APPOINTMENT (OUTPATIENT)
Dept: PHYSICAL THERAPY | Facility: CLINIC | Age: 58
End: 2023-04-18

## 2023-04-20 ENCOUNTER — APPOINTMENT (OUTPATIENT)
Dept: PHYSICAL THERAPY | Facility: CLINIC | Age: 58
End: 2023-04-20

## 2023-04-21 ENCOUNTER — APPOINTMENT (OUTPATIENT)
Dept: PHYSICAL THERAPY | Facility: CLINIC | Age: 58
End: 2023-04-21

## 2023-04-22 DIAGNOSIS — R07.9 CHEST PAIN, UNSPECIFIED TYPE: ICD-10-CM

## 2023-04-24 RX ORDER — ATORVASTATIN CALCIUM 40 MG/1
TABLET, FILM COATED ORAL
Qty: 90 TABLET | Refills: 0 | Status: SHIPPED | OUTPATIENT
Start: 2023-04-24

## 2023-04-25 ENCOUNTER — APPOINTMENT (OUTPATIENT)
Dept: PHYSICAL THERAPY | Facility: CLINIC | Age: 58
End: 2023-04-25

## 2023-05-03 ENCOUNTER — OFFICE VISIT (OUTPATIENT)
Dept: PHYSICAL THERAPY | Facility: CLINIC | Age: 58
End: 2023-05-03

## 2023-05-03 DIAGNOSIS — M47.816 FACET ARTHROPATHY, LUMBAR: ICD-10-CM

## 2023-05-03 DIAGNOSIS — M54.50 ACUTE MIDLINE LOW BACK PAIN WITHOUT SCIATICA: ICD-10-CM

## 2023-05-03 DIAGNOSIS — M54.2 NECK PAIN: Primary | ICD-10-CM

## 2023-05-03 DIAGNOSIS — M50.30 DEGENERATIVE CERVICAL DISC: ICD-10-CM
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
## 2023-05-10 ENCOUNTER — APPOINTMENT (OUTPATIENT)
Dept: PHYSICAL THERAPY | Facility: CLINIC | Age: 58
End: 2023-05-10
Payer: COMMERCIAL

## 2023-05-16 ENCOUNTER — APPOINTMENT (OUTPATIENT)
Dept: PHYSICAL THERAPY | Facility: CLINIC | Age: 58
End: 2023-05-16
Payer: COMMERCIAL

## 2023-05-18 ENCOUNTER — APPOINTMENT (OUTPATIENT)
Dept: PHYSICAL THERAPY | Facility: CLINIC | Age: 58
End: 2023-05-18
Payer: COMMERCIAL

## 2023-05-18 ENCOUNTER — OFFICE VISIT (OUTPATIENT)
Dept: PHYSICAL THERAPY | Facility: CLINIC | Age: 58
End: 2023-05-18

## 2023-05-18 DIAGNOSIS — M54.2 NECK PAIN: Primary | ICD-10-CM
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
## 2023-05-18 DIAGNOSIS — M50.30 DEGENERATIVE CERVICAL DISC: ICD-10-CM

## 2023-05-18 DIAGNOSIS — M47.816 FACET ARTHROPATHY, LUMBAR: ICD-10-CM

## 2023-05-18 DIAGNOSIS — M54.50 ACUTE MIDLINE LOW BACK PAIN WITHOUT SCIATICA: ICD-10-CM
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
## 2023-05-19 ENCOUNTER — APPOINTMENT (OUTPATIENT)
Dept: PHYSICAL THERAPY | Facility: CLINIC | Age: 58
End: 2023-05-19
Payer: COMMERCIAL

## 2023-05-23 ENCOUNTER — APPOINTMENT (OUTPATIENT)
Dept: PHYSICAL THERAPY | Facility: CLINIC | Age: 58
End: 2023-05-23
Payer: COMMERCIAL

## 2023-05-25 ENCOUNTER — APPOINTMENT (OUTPATIENT)
Dept: PHYSICAL THERAPY | Facility: CLINIC | Age: 58
End: 2023-05-25
Payer: COMMERCIAL

## 2023-05-26 ENCOUNTER — APPOINTMENT (OUTPATIENT)
Dept: PHYSICAL THERAPY | Facility: CLINIC | Age: 58
End: 2023-05-26
Payer: COMMERCIAL

## 2023-05-30 ENCOUNTER — TELEPHONE (OUTPATIENT)
Dept: FAMILY MEDICINE CLINIC | Facility: MEDICAL CENTER | Age: 58
End: 2023-05-30

## 2023-05-30 NOTE — TELEPHONE ENCOUNTER
Dr Angela Borjas with cardiology prescribes patient's metoprolol, HCTZ and aspirin  I cannot advise patient to stop or hold any of these medications as they are not prescribed by me  If she would like to further discuss and show me the form that is requested from the blood donation clinic she is more than welcome to bring this in for further discussion

## 2023-05-30 NOTE — TELEPHONE ENCOUNTER
Can we please verify what  medications she is currently taking  For those that she is no longer taking, when did she stop and why? Also, I would need to see the form from the blood donation center stating the reason she cannot donate in order for me to properly fill out a form or provide letter for her

## 2023-05-30 NOTE — TELEPHONE ENCOUNTER
Patient is donating Plasma to a private blood bank for money  They told her she is on too many medications and it wouldn't be safe  If only on 3 medication and holds her aspirin for 48 hours then its ok  There is a form that can be filled out but she will have to get it if you want the form  States she is not on mobic anymore , not Cymbalta

## 2023-05-30 NOTE — TELEPHONE ENCOUNTER
Pt called to request to have Ada Hind provide her with a letter so she can donate plasma  She had wanted to donate previously, but there was an issue with her medications  She said she is good now, but they want her to have a letter      Routed to [employer]

## 2023-06-07 ENCOUNTER — TELEPHONE (OUTPATIENT)
Dept: OBGYN CLINIC | Facility: HOSPITAL | Age: 58
End: 2023-06-07

## 2023-06-07 NOTE — TELEPHONE ENCOUNTER
Caller: Patient     Doctor: Dora Fleming     Reason for call: Patient is calling in stating that she needs to return to work or she is going to lose her job  She is asking if Dr John Doctor can write a note for her to return to work  They need to know how long she should have her restrictions to lift things  She would like to  at [facility]       Call back#: [phone]

## 2023-06-07 NOTE — TELEPHONE ENCOUNTER
Called pt  She requires an RTW note w/ 10lb limit lifting/pushing/pulling restriction and seated breaks when necessary

## 2023-06-07 NOTE — TELEPHONE ENCOUNTER
Caller: Patient    Doctor: Adrianne Aden    Reason for call: Patient calling for the status of her RTW letter  She needs to  in [place] before the end of day      Call back#: [phone]

## 2023-06-13 ENCOUNTER — TELEPHONE (OUTPATIENT)
Dept: CARDIOLOGY CLINIC | Facility: MEDICAL CENTER | Age: 58
End: 2023-06-13

## 2023-06-13 DIAGNOSIS — R07.9 CHEST PAIN, UNSPECIFIED TYPE: ICD-10-CM

## 2023-06-13 DIAGNOSIS — I10 ESSENTIAL HYPERTENSION: ICD-10-CM

## 2023-06-13 DIAGNOSIS — E78.00 ELEVATED CHOLESTEROL: ICD-10-CM

## 2023-06-13 DIAGNOSIS — I16.0 HYPERTENSIVE URGENCY: ICD-10-CM

## 2023-06-13 RX ORDER — ATORVASTATIN CALCIUM 40 MG/1
40 TABLET, FILM COATED ORAL
Qty: 90 TABLET | Refills: 3 | Status: SHIPPED | OUTPATIENT
Start: 2023-06-13

## 2023-06-13 RX ORDER — ASPIRIN 81 MG/1
81 TABLET, CHEWABLE ORAL DAILY
Qty: 90 TABLET | Refills: 3 | Status: SHIPPED | OUTPATIENT
Start: 2023-06-13

## 2023-07-25 ENCOUNTER — TELEPHONE (OUTPATIENT)
Dept: OBGYN CLINIC | Facility: HOSPITAL | Age: 58
End: 2023-07-25

## 2023-07-25 NOTE — TELEPHONE ENCOUNTER
Caller: Patient     Doctor: Torito Stroud     Reason for call: Asking what time she can  her work letter.      Call back#:

## 2023-08-21 ENCOUNTER — TELEPHONE (OUTPATIENT)
Age: 58
End: 2023-08-21

## 2023-08-21 NOTE — TELEPHONE ENCOUNTER
.Caller: pt    Doctor: Tanvir Haney    Reason for call: Please reach back out to pt for rescheduling    Call back#: [phone]

## 2023-08-22 ENCOUNTER — OFFICE VISIT (OUTPATIENT)
Dept: OBGYN CLINIC | Facility: MEDICAL CENTER | Age: 58
End: 2023-08-22
Payer: COMMERCIAL

## 2023-08-22 VITALS — HEIGHT: 60 IN | WEIGHT: 147 LBS | BODY MASS INDEX: 28.86 KG/M2

## 2023-08-22 DIAGNOSIS — M50.30 DEGENERATIVE CERVICAL DISC: ICD-10-CM
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
## 2023-08-22 DIAGNOSIS — M47.816 FACET ARTHROPATHY, LUMBAR: ICD-10-CM

## 2023-08-22 DIAGNOSIS — G89.29 CHRONIC NECK PAIN: Primary | ICD-10-CM

## 2023-08-22 DIAGNOSIS — M54.50 ACUTE MIDLINE LOW BACK PAIN WITHOUT SCIATICA: ICD-10-CM

## 2023-08-22 DIAGNOSIS — M54.2 CHRONIC NECK PAIN: Primary | ICD-10-CM

## 2023-08-22 PROCEDURE — 99212 OFFICE O/P EST SF 10 MIN: CPT | Performed by: EMERGENCY MEDICINE

## 2023-08-22 NOTE — LETTER
August 22, 2023     Patient: Ruthy Gowers  YOB: 1965  Date of Visit: 8/22/23      To Whom it May Concern:    Ruthy Gowers is under my professional care. May return to restricted duty: max 5lb limit lifting/pushing/pulling restriction. Minimal or occasional bending. Please allow sitting on stool during shift. If you have any questions or concerns, please don't hesitate to call.          Sincerely,          Marielena Clinton MD        CC: No Recipients

## 2023-08-22 NOTE — LETTER
August 22, 2023     Patient: Elma Blackman  YOB: 1965  Date of Visit: 8/22/2023      To Whom it May Concern:    Elma Blackman is under my professional care. Noni Thomas was seen in my office on 8/22/2023. Work excuse for 7/17-7/21 and 7/24 due to medical condition/pain. If you have any questions or concerns, please don't hesitate to call.          Sincerely,          Abdullahi Sheehan MD        CC: No Recipients
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
## 2023-09-07 ENCOUNTER — TELEPHONE (OUTPATIENT)
Age: 58
End: 2023-09-07

## 2023-09-07 NOTE — TELEPHONE ENCOUNTER
Caller: patient    Doctor: Alden Muniz    Reason for call: patient checking on if we received paperwork from disability    Call back#: n/a

## 2023-09-11 NOTE — TELEPHONE ENCOUNTER
Caller: Patient    Doctor: Baldev Cody    Reason for call: Patient stated two different disability forms were faxed from [employer]. Jeronimo faxed documents 9/8/23 and Jordan Juarez faxed forms 9/11/23.  Advised 10-14 days for completion    Call back#: [phone]

## 2023-09-11 NOTE — TELEPHONE ENCOUNTER
Caller: Patient     Doctor: Leidy Jin     Reason for call: Patient states her job is closing by Friday but they told her if she can have the paperwork submitted by Friday she will still get  short term disability     Call back#: [phone] 50.5

## 2023-09-14 NOTE — TELEPHONE ENCOUNTER
Caller: Patient    Doctor: Jae Angelo    Reason for call: calling to check on her disability paperwork. Advised it was sent 9/13 per log.     Call back#: n/a
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
## 2023-10-05 ENCOUNTER — HOSPITAL ENCOUNTER (OUTPATIENT)
Dept: RADIOLOGY | Facility: MEDICAL CENTER | Age: 58
Discharge: HOME | End: 2023-10-05
Payer: COMMERCIAL

## 2023-10-05 VITALS
RESPIRATION RATE: 20 BRPM | DIASTOLIC BLOOD PRESSURE: 92 MMHG | OXYGEN SATURATION: 99 % | SYSTOLIC BLOOD PRESSURE: 162 MMHG | TEMPERATURE: 97.6 F | HEART RATE: 69 BPM

## 2023-10-05 DIAGNOSIS — M47.817 LUMBOSACRAL SPONDYLOSIS WITHOUT MYELOPATHY: ICD-10-CM

## 2023-10-05 PROCEDURE — 64494 INJ PARAVERT F JNT L/S 2 LEV: CPT | Performed by: ANESTHESIOLOGY

## 2023-10-05 PROCEDURE — 64493 INJ PARAVERT F JNT L/S 1 LEV: CPT | Performed by: ANESTHESIOLOGY

## 2023-10-05 RX ADMIN — Medication 3 ML: at 11:11

## 2023-10-05 NOTE — H&P
History of Present Illness: The patient is a 65 y.o. female who presents with complaints of back pain    Past Medical History:   Diagnosis Date   • Fibroids    • History of ovarian cyst    • Hyperlipidemia    • Hypertension    • Migraine    • Thyroid nodule        Past Surgical History:   Procedure Laterality Date   • ENDOMETRIAL BIOPSY      W/o cervical dilation.  Resolved 9/30/2014     • TONSILLECTOMY      Age 6     • TUBAL LIGATION           Current Outpatient Medications:   •  ALPRAZolam (XANAX) 1 mg tablet, Take 1mg PO 45 minutes prior to procedure for anxiety, Disp: 1 tablet, Rfl: 0  •  aspirin 81 mg chewable tablet, Chew 1 tablet (81 mg total) daily, Disp: 90 tablet, Rfl: 3  •  atorvastatin (LIPITOR) 40 mg tablet, Take 1 tablet (40 mg total) by mouth daily with dinner, Disp: 90 tablet, Rfl: 3  •  DULoxetine (CYMBALTA) 20 mg capsule, Take 1 capsule (20 mg total) by mouth daily, Disp: 90 capsule, Rfl: 0  •  ezetimibe (ZETIA) 10 mg tablet, Take 1 tablet (10 mg total) by mouth daily, Disp: 90 tablet, Rfl: 1  •  hydrochlorothiazide (HYDRODIURIL) 25 mg tablet, Take 1 tablet (25 mg total) by mouth daily, Disp: 90 tablet, Rfl: 0  •  meloxicam (Mobic) 15 mg tablet, Take 1 tablet (15 mg total) by mouth daily, Disp: 30 tablet, Rfl: 1  •  metoprolol tartrate (LOPRESSOR) 25 mg tablet, Take 1 tablet (25 mg total) by mouth every 12 (twelve) hours, Disp: 180 tablet, Rfl: 3  •  tiZANidine (ZANAFLEX) 4 mg tablet, Take 1 tablet (4 mg total) by mouth 2 (two) times a day, Disp: 60 tablet, Rfl: 1    Current Facility-Administered Medications:   •  lidocaine (PF) (XYLOCAINE-MPF) 2 % injection 3 mL, 3 mL, Perineural, Once, Will Mattie Delgado MD    No Known Allergies    Physical Exam:   Vitals:    10/05/23 1044   BP: 155/91   Pulse: 61   Resp: 18   Temp: 97.6 °F (36.4 °C)   SpO2: 99%     General: Awake, Alert, Oriented x 3, Mood and affect appropriate  Respiratory: Respirations even and unlabored  Cardiovascular: Peripheral pulses intact; no edema  Musculoskeletal Exam: back pain    ASA Score: 2    Patient/Chart Verification  Patient ID Verified: Verbal  ID Band Applied: No  Consents Confirmed: Procedural, To be obtained in the Pre-Procedure area  H&P( within 30 days) Verified: To be obtained in the Pre-Procedure area  Interval H&P(within 24 hr) Complete (required for Outpatients and Surgery Admit only): To be obtained in the Pre-Procedure area  Allergies Reviewed: Yes  Anticoag/NSAID held?: NA  Currently on antibiotics?: No  Pregnancy denied?: NA    Assessment:   1.  Lumbosacral spondylosis without myelopathy        Plan: Bilateral L3-5 MBB #1

## 2023-10-05 NOTE — DISCHARGE INSTRUCTIONS

## 2023-10-10 DIAGNOSIS — I16.0 HYPERTENSIVE URGENCY: ICD-10-CM

## 2023-10-11 RX ORDER — HYDROCHLOROTHIAZIDE 25 MG/1
25 TABLET ORAL DAILY
Qty: 90 TABLET | Refills: 3 | Status: SHIPPED | OUTPATIENT
Start: 2023-10-11

## 2023-10-19 DIAGNOSIS — M47.817 LUMBOSACRAL SPONDYLOSIS WITHOUT MYELOPATHY: ICD-10-CM

## 2023-10-19 DIAGNOSIS — M47.812 CERVICAL SPONDYLOSIS WITHOUT MYELOPATHY: ICD-10-CM

## 2023-10-20 RX ORDER — TIZANIDINE 4 MG/1
4 TABLET ORAL 2 TIMES DAILY
Qty: 60 TABLET | Refills: 1 | Status: SHIPPED | OUTPATIENT
Start: 2023-10-20

## 2023-11-15 DIAGNOSIS — I16.0 HYPERTENSIVE URGENCY: ICD-10-CM

## 2023-11-17 ENCOUNTER — TELEPHONE (OUTPATIENT)
Age: 58
End: 2023-11-17

## 2023-11-17 NOTE — TELEPHONE ENCOUNTER
Caller: Jadyn Stroud     Doctor: Dr Sharad Leos     Reason for call: Patient calling stating she has not received call back or does not know what are the next steps of treatment.  Patient states pain came back please advise      Call back#: [phone]

## 2023-11-17 NOTE — TELEPHONE ENCOUNTER
S/W pt. Pt stated she faxed her pain diary for MBB procedure on 10/5. Advised it is not in her chart. First 3 hours her pain was a 8/10, 3-6 hr shaka pain was 6/10 and then pain back to 8/10. Please advise.

## 2023-11-28 ENCOUNTER — VBI (OUTPATIENT)
Dept: ADMINISTRATIVE | Facility: OTHER | Age: 58
End: 2023-11-28

## 2023-11-29 ENCOUNTER — OFFICE VISIT (OUTPATIENT)
Dept: PAIN MEDICINE | Facility: CLINIC | Age: 58
End: 2023-11-29
Payer: COMMERCIAL

## 2023-11-29 VITALS
BODY MASS INDEX: 28.86 KG/M2 | HEIGHT: 60 IN | WEIGHT: 147 LBS | SYSTOLIC BLOOD PRESSURE: 146 MMHG | DIASTOLIC BLOOD PRESSURE: 92 MMHG

## 2023-11-29 DIAGNOSIS — M47.817 LUMBOSACRAL SPONDYLOSIS WITHOUT MYELOPATHY: Primary | ICD-10-CM

## 2023-11-29 PROCEDURE — 99214 OFFICE O/P EST MOD 30 MIN: CPT | Performed by: ANESTHESIOLOGY

## 2023-11-29 RX ORDER — ALPRAZOLAM 1 MG/1
TABLET ORAL
Qty: 1 TABLET | Refills: 0 | Status: SHIPPED | OUTPATIENT
Start: 2023-11-29

## 2023-11-29 RX ORDER — ALPRAZOLAM 0.5 MG/1
TABLET ORAL
Qty: 1 TABLET | Refills: 0 | Status: SHIPPED | OUTPATIENT
Start: 2023-11-29
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
## 2023-12-28 ENCOUNTER — HOSPITAL ENCOUNTER (OUTPATIENT)
Dept: RADIOLOGY | Facility: MEDICAL CENTER | Age: 58
Discharge: HOME | End: 2023-12-28
Payer: COMMERCIAL

## 2023-12-28 VITALS
RESPIRATION RATE: 18 BRPM | SYSTOLIC BLOOD PRESSURE: 159 MMHG | HEART RATE: 69 BPM | TEMPERATURE: 97.3 F | DIASTOLIC BLOOD PRESSURE: 77 MMHG | OXYGEN SATURATION: 98 %

## 2023-12-28 DIAGNOSIS — M47.817 LUMBOSACRAL SPONDYLOSIS WITHOUT MYELOPATHY: ICD-10-CM

## 2023-12-28 PROCEDURE — 64493 INJ PARAVERT F JNT L/S 1 LEV: CPT | Performed by: ANESTHESIOLOGY

## 2023-12-28 PROCEDURE — 64494 INJ PARAVERT F JNT L/S 2 LEV: CPT | Performed by: ANESTHESIOLOGY

## 2023-12-28 RX ORDER — BUPIVACAINE HYDROCHLORIDE 5 MG/ML
3 INJECTION, SOLUTION EPIDURAL; INTRACAUDAL ONCE
Status: COMPLETED | OUTPATIENT
Start: 2023-12-28 | End: 2023-12-28

## 2023-12-28 RX ADMIN — BUPIVACAINE HYDROCHLORIDE 3 ML: 5 INJECTION, SOLUTION EPIDURAL; INTRACAUDAL at 15:19

## 2023-12-28 NOTE — H&P
History of Present Illness: The patient is a 58 y.o. female who presents with complaints of back pain    Past Medical History:   Diagnosis Date    Fibroids     History of ovarian cyst     Hyperlipidemia     Hypertension     Migraine     Thyroid nodule        Past Surgical History:   Procedure Laterality Date    ENDOMETRIAL BIOPSY      W/o cervical dilation. Resolved 9/30/2014      TONSILLECTOMY      Age 10      TUBAL LIGATION           Current Outpatient Medications:     ALPRAZolam (XANAX) 0.5 mg tablet, Take 0.5mg PO 30 minutes prior to procedure for anxiety (30 minutes after taking 1mg Xanax dose), Disp: 1 tablet, Rfl: 0    ALPRAZolam (XANAX) 1 mg tablet, Take 1mg PO 60 minutes prior to procedure for anxiety, Disp: 1 tablet, Rfl: 0    aspirin 81 mg chewable tablet, Chew 1 tablet (81 mg total) daily, Disp: 90 tablet, Rfl: 3    atorvastatin (LIPITOR) 40 mg tablet, Take 1 tablet (40 mg total) by mouth daily with dinner, Disp: 90 tablet, Rfl: 3    DULoxetine (CYMBALTA) 20 mg capsule, Take 1 capsule (20 mg total) by mouth daily, Disp: 90 capsule, Rfl: 0    ezetimibe (ZETIA) 10 mg tablet, Take 1 tablet (10 mg total) by mouth daily, Disp: 90 tablet, Rfl: 1    hydrochlorothiazide (HYDRODIURIL) 25 mg tablet, TAKE 1 TABLET BY MOUTH EVERY DAY, Disp: 90 tablet, Rfl: 3    meloxicam (Mobic) 15 mg tablet, Take 1 tablet (15 mg total) by mouth daily, Disp: 30 tablet, Rfl: 1    metoprolol tartrate (LOPRESSOR) 25 mg tablet, TAKE 1 TABLET (25 MG TOTAL) BY MOUTH EVERY 12 (TWELVE) HOURS, Disp: 180 tablet, Rfl: 3    tiZANidine (ZANAFLEX) 4 mg tablet, TAKE 1 TABLET BY MOUTH 2 TIMES A DAY., Disp: 60 tablet, Rfl: 1    Current Facility-Administered Medications:     bupivacaine (PF) (MARCAINE) 0.5 % injection 3 mL, 3 mL, Injection, Once, Will Zoey Cao MD    No Known Allergies    Physical Exam:   Vitals:    12/28/23 1454   BP: 152/85   Pulse: 62   Resp: 20   Temp: (!) 97.3 °F (36.3 °C)   SpO2: 99%     General: Awake, Alert, Oriented x 3,  Mood and affect appropriate  Respiratory: Respirations even and unlabored  Cardiovascular: Peripheral pulses intact; no edema  Musculoskeletal Exam: back pain    ASA Score: 2    Patient/Chart Verification  Patient ID Verified: Verbal  ID Band Applied: No  Consents Confirmed: Procedural, To be obtained in the Pre-Procedure area  H&P( within 30 days) Verified: To be obtained in the Pre-Procedure area  Interval H&P(within 24 hr) Complete (required for Outpatients and Surgery Admit only): To be obtained in the Pre-Procedure area  Allergies Reviewed: Yes  Anticoag/NSAID held?: NA  Currently on antibiotics?: No    Assessment:   1. Lumbosacral spondylosis without myelopathy        Plan: bilateral L3-5 MBB#2

## 2023-12-28 NOTE — DISCHARGE INSTRUCTIONS

## 2024-01-02 ENCOUNTER — TELEPHONE (OUTPATIENT)
Dept: PAIN MEDICINE | Facility: CLINIC | Age: 59
End: 2024-01-02

## 2024-01-02 DIAGNOSIS — M47.817 LUMBOSACRAL SPONDYLOSIS WITHOUT MYELOPATHY: ICD-10-CM

## 2024-01-02 NOTE — TELEPHONE ENCOUNTER
Caller: Alina    Doctor: Kiley    Reason for call: patient just faxed over pain diary please review and advise on next step    Call back#: [phone]

## 2024-01-02 NOTE — TELEPHONE ENCOUNTER
Pain Diary reviewed by Dr. Cao and is ok to proceed with Rfa's. Orders placed.    Called patient and scheduled:    RT L3-5 RFA on 1/25    LT L3-5 RFA on 2/8    6 wk f/u on 3/19    Reviewed instructions: , NPO 1 hour prior, loose-fitting/comfortable clothing, if ill/fever/infx/abx to call and reschedule.  No need to hold any meds prior.  Patient stated verbal understanding.     ** Instructions mailed to patient **

## 2024-01-02 NOTE — TELEPHONE ENCOUNTER
Patient is requesting sedative for both Ablation procedures. She said the MBB's were both difficult to get through.    Please advise.

## 2024-01-02 NOTE — TELEPHONE ENCOUNTER
Caller: Alina     Doctor: Kiley     Reason for call: patient just faxed over pain diary please review and advise on next step     Call back#: [phone]

## 2024-01-03 RX ORDER — ALPRAZOLAM 1 MG/1
TABLET ORAL
Qty: 1 TABLET | Refills: 0 | Status: SHIPPED | OUTPATIENT
Start: 2024-01-03

## 2024-01-03 NOTE — TELEPHONE ENCOUNTER
Will Zoey Cao MD  [facility] hours ago (7:32 AM)       Can do the right side first with Xanax and see how it goes for the patient. Can let her know that RFA is usually better tolerated as we numb the area first and it is one side at a time. Xanax 1 dose sent for Right lumbar RFA

## 2024-01-03 NOTE — TELEPHONE ENCOUNTER
Caller: Alina    Doctor: Kiley    Reason for call: Pt is returning nurse call     Call back#: [phone]

## 2024-01-25 ENCOUNTER — HOSPITAL ENCOUNTER (OUTPATIENT)
Dept: RADIOLOGY | Facility: MEDICAL CENTER | Age: 59
End: 2024-01-25
Payer: COMMERCIAL

## 2024-01-25 ENCOUNTER — TELEPHONE (OUTPATIENT)
Dept: PAIN MEDICINE | Facility: CLINIC | Age: 59
End: 2024-01-25

## 2024-01-25 VITALS
DIASTOLIC BLOOD PRESSURE: 74 MMHG | RESPIRATION RATE: 18 BRPM | SYSTOLIC BLOOD PRESSURE: 140 MMHG | HEART RATE: 65 BPM | OXYGEN SATURATION: 98 % | TEMPERATURE: 97.3 F

## 2024-01-25 DIAGNOSIS — M47.816 LUMBAR SPONDYLOSIS: ICD-10-CM

## 2024-01-25 PROCEDURE — 64635 DESTROY LUMB/SAC FACET JNT: CPT | Performed by: ANESTHESIOLOGY

## 2024-01-25 PROCEDURE — 64636 DESTROY L/S FACET JNT ADDL: CPT | Performed by: ANESTHESIOLOGY

## 2024-01-25 RX ORDER — METHYLPREDNISOLONE ACETATE 40 MG/ML
40 INJECTION, SUSPENSION INTRA-ARTICULAR; INTRALESIONAL; INTRAMUSCULAR; PARENTERAL; SOFT TISSUE ONCE
Status: COMPLETED | OUTPATIENT
Start: 2024-01-25 | End: 2024-01-25

## 2024-01-25 RX ORDER — BUPIVACAINE HCL/PF 2.5 MG/ML
2 VIAL (ML) INJECTION ONCE
Status: COMPLETED | OUTPATIENT
Start: 2024-01-25 | End: 2024-01-25

## 2024-01-25 RX ORDER — LIDOCAINE HYDROCHLORIDE 10 MG/ML
10 INJECTION, SOLUTION EPIDURAL; INFILTRATION; INTRACAUDAL; PERINEURAL ONCE
Status: COMPLETED | OUTPATIENT
Start: 2024-01-25 | End: 2024-01-25

## 2024-01-25 RX ADMIN — LIDOCAINE HYDROCHLORIDE 10 ML: 10 INJECTION, SOLUTION EPIDURAL; INFILTRATION; INTRACAUDAL at 10:10

## 2024-01-25 RX ADMIN — Medication 2 ML: at 10:21

## 2024-01-25 RX ADMIN — METHYLPREDNISOLONE ACETATE 40 MG: 40 INJECTION, SUSPENSION INTRA-ARTICULAR; INTRALESIONAL; INTRAMUSCULAR; PARENTERAL; SOFT TISSUE at 10:21

## 2024-01-25 RX ADMIN — Medication 1.5 ML: at 10:18

## 2024-01-25 NOTE — H&P
History of Present Illness: The patient is a 58 y.o. female who presents with complaints of back pain    Past Medical History:   Diagnosis Date    Fibroids     History of ovarian cyst     Hyperlipidemia     Hypertension     Migraine     Thyroid nodule        Past Surgical History:   Procedure Laterality Date    ENDOMETRIAL BIOPSY      W/o cervical dilation. Resolved 9/30/2014      TONSILLECTOMY      Age 10      TUBAL LIGATION           Current Outpatient Medications:     ALPRAZolam (XANAX) 0.5 mg tablet, Take 0.5mg PO 30 minutes prior to procedure for anxiety (30 minutes after taking 1mg Xanax dose), Disp: 1 tablet, Rfl: 0    ALPRAZolam (XANAX) 1 mg tablet, Take 1mg PO 60 minutes prior to procedure for anxiety, Disp: 1 tablet, Rfl: 0    aspirin 81 mg chewable tablet, Chew 1 tablet (81 mg total) daily, Disp: 90 tablet, Rfl: 3    atorvastatin (LIPITOR) 40 mg tablet, Take 1 tablet (40 mg total) by mouth daily with dinner, Disp: 90 tablet, Rfl: 3    DULoxetine (CYMBALTA) 20 mg capsule, Take 1 capsule (20 mg total) by mouth daily, Disp: 90 capsule, Rfl: 0    ezetimibe (ZETIA) 10 mg tablet, Take 1 tablet (10 mg total) by mouth daily, Disp: 90 tablet, Rfl: 1    hydrochlorothiazide (HYDRODIURIL) 25 mg tablet, TAKE 1 TABLET BY MOUTH EVERY DAY, Disp: 90 tablet, Rfl: 3    meloxicam (Mobic) 15 mg tablet, Take 1 tablet (15 mg total) by mouth daily, Disp: 30 tablet, Rfl: 1    metoprolol tartrate (LOPRESSOR) 25 mg tablet, TAKE 1 TABLET (25 MG TOTAL) BY MOUTH EVERY 12 (TWELVE) HOURS, Disp: 180 tablet, Rfl: 3    tiZANidine (ZANAFLEX) 4 mg tablet, TAKE 1 TABLET BY MOUTH 2 TIMES A DAY., Disp: 60 tablet, Rfl: 1    Current Facility-Administered Medications:     bupivacaine (PF) (MARCAINE) 0.25 % injection 2 mL, 2 mL, Perineural, Once, Will Zoey Cao MD    lidocaine (PF) (XYLOCAINE-MPF) 1 % injection 10 mL, 10 mL, Infiltration, Once, Will Zoey Cao MD    lidocaine (PF) (XYLOCAINE-MPF) 2 % injection 1.5 mL, 1.5 mL, Perineural, Once,  Will Zoey Cao MD    methylPREDNISolone acetate (DEPO-MEDROL) injection 40 mg, 40 mg, Perineural, Once, Will Zoey Cao MD    No Known Allergies    Physical Exam:   Vitals:    01/25/24 0946   BP: 161/93   Pulse: 62   Resp: 20   Temp: (!) 97.3 °F (36.3 °C)   SpO2: 98%     General: Awake, Alert, Oriented x 3, Mood and affect appropriate  Respiratory: Respirations even and unlabored  Cardiovascular: Peripheral pulses intact; no edema  Musculoskeletal Exam: pain with lumbar ROM    ASA Score: 2    Patient/Chart Verification  Patient ID Verified: Verbal  ID Band Applied: No  Consents Confirmed: Procedural, To be obtained in the Pre-Procedure area  H&P( within 30 days) Verified: To be obtained in the Pre-Procedure area  Interval H&P(within 24 hr) Complete (required for Outpatients and Surgery Admit only): To be obtained in the Pre-Procedure area  Allergies Reviewed: Yes  Anticoag/NSAID held?: NA  Currently on antibiotics?: No    Assessment:   1. Lumbar spondylosis        Plan: RT L3-5 RFA

## 2024-01-25 NOTE — DISCHARGE INSTRUCTIONS

## 2024-01-29 NOTE — TELEPHONE ENCOUNTER
S/W pt. Pt stated current pain level is 5/10.  Pt stated needle sites look good, denies S&S of infection, denies fevers, denies soreness and denies sun burn like sensation.  Advised pt if he does get pain to take his prescribed or OTC pain medications and/or use ice/heat and that it takes 4 to 6 weeks to see the full effect.  Confirmed next appt w/ pt.  Pt verbalized understanding.

## 2024-02-06 ENCOUNTER — TELEPHONE (OUTPATIENT)
Age: 59
End: 2024-02-06

## 2024-02-15 ENCOUNTER — HOSPITAL ENCOUNTER (OUTPATIENT)
Dept: RADIOLOGY | Facility: MEDICAL CENTER | Age: 59
End: 2024-02-15
Payer: COMMERCIAL

## 2024-02-15 ENCOUNTER — TELEPHONE (OUTPATIENT)
Dept: PAIN MEDICINE | Facility: MEDICAL CENTER | Age: 59
End: 2024-02-15

## 2024-02-15 VITALS
SYSTOLIC BLOOD PRESSURE: 143 MMHG | HEART RATE: 65 BPM | RESPIRATION RATE: 18 BRPM | TEMPERATURE: 97.7 F | OXYGEN SATURATION: 99 % | DIASTOLIC BLOOD PRESSURE: 89 MMHG

## 2024-02-15 DIAGNOSIS — M47.816 LUMBAR SPONDYLOSIS: ICD-10-CM

## 2024-02-15 DIAGNOSIS — F41.9 ANXIETY: ICD-10-CM

## 2024-02-15 PROCEDURE — 64635 DESTROY LUMB/SAC FACET JNT: CPT | Performed by: ANESTHESIOLOGY

## 2024-02-15 PROCEDURE — 64636 DESTROY L/S FACET JNT ADDL: CPT | Performed by: ANESTHESIOLOGY

## 2024-02-15 RX ORDER — METHYLPREDNISOLONE ACETATE 40 MG/ML
40 INJECTION, SUSPENSION INTRA-ARTICULAR; INTRALESIONAL; INTRAMUSCULAR; PARENTERAL; SOFT TISSUE ONCE
Status: COMPLETED | OUTPATIENT
Start: 2024-02-15 | End: 2024-02-15

## 2024-02-15 RX ORDER — BUPIVACAINE HCL/PF 2.5 MG/ML
2 VIAL (ML) INJECTION ONCE
Status: COMPLETED | OUTPATIENT
Start: 2024-02-15 | End: 2024-02-15

## 2024-02-15 RX ORDER — LIDOCAINE HYDROCHLORIDE 10 MG/ML
10 INJECTION, SOLUTION EPIDURAL; INFILTRATION; INTRACAUDAL; PERINEURAL ONCE
Status: COMPLETED | OUTPATIENT
Start: 2024-02-15 | End: 2024-02-15

## 2024-02-15 RX ADMIN — METHYLPREDNISOLONE ACETATE 40 MG: 40 INJECTION, SUSPENSION INTRA-ARTICULAR; INTRALESIONAL; INTRAMUSCULAR; PARENTERAL; SOFT TISSUE at 09:04

## 2024-02-15 RX ADMIN — Medication 2 ML: at 09:04

## 2024-02-15 RX ADMIN — LIDOCAINE HYDROCHLORIDE 10 ML: 10 INJECTION, SOLUTION EPIDURAL; INFILTRATION; INTRACAUDAL at 08:48

## 2024-02-15 RX ADMIN — Medication 1.5 ML: at 09:00

## 2024-02-15 NOTE — DISCHARGE INSTRUCTIONS

## 2024-02-15 NOTE — TELEPHONE ENCOUNTER
When is the last time patient took this medication? We re-started her on this 11/2022 for 3 month supply. Has she been off of this for 2 year? If so, I'd like to see her sooner to re-start and further discuss.

## 2024-02-15 NOTE — H&P
History of Present Illness: The patient is a 58 y.o. female who presents with complaints of back pain    Past Medical History:   Diagnosis Date    Fibroids     History of ovarian cyst     Hyperlipidemia     Hypertension     Migraine     Thyroid nodule        Past Surgical History:   Procedure Laterality Date    ENDOMETRIAL BIOPSY      W/o cervical dilation. Resolved 9/30/2014      TONSILLECTOMY      Age 10      TUBAL LIGATION           Current Outpatient Medications:     ALPRAZolam (XANAX) 1 mg tablet, Take 1mg PO 60 minutes prior to procedure for anxiety, Disp: 1 tablet, Rfl: 0    aspirin 81 mg chewable tablet, Chew 1 tablet (81 mg total) daily, Disp: 90 tablet, Rfl: 3    atorvastatin (LIPITOR) 40 mg tablet, Take 1 tablet (40 mg total) by mouth daily with dinner, Disp: 90 tablet, Rfl: 3    DULoxetine (CYMBALTA) 20 mg capsule, Take 1 capsule (20 mg total) by mouth daily, Disp: 90 capsule, Rfl: 0    ezetimibe (ZETIA) 10 mg tablet, Take 1 tablet (10 mg total) by mouth daily, Disp: 90 tablet, Rfl: 1    hydrochlorothiazide (HYDRODIURIL) 25 mg tablet, TAKE 1 TABLET BY MOUTH EVERY DAY, Disp: 90 tablet, Rfl: 3    meloxicam (Mobic) 15 mg tablet, Take 1 tablet (15 mg total) by mouth daily, Disp: 30 tablet, Rfl: 1    metoprolol tartrate (LOPRESSOR) 25 mg tablet, TAKE 1 TABLET (25 MG TOTAL) BY MOUTH EVERY 12 (TWELVE) HOURS, Disp: 180 tablet, Rfl: 3    tiZANidine (ZANAFLEX) 4 mg tablet, TAKE 1 TABLET BY MOUTH 2 TIMES A DAY., Disp: 60 tablet, Rfl: 1    Current Facility-Administered Medications:     bupivacaine (PF) (MARCAINE) 0.25 % injection 2 mL, 2 mL, Perineural, Once, Will Zoey Cao MD    lidocaine (PF) (XYLOCAINE-MPF) 1 % injection 10 mL, 10 mL, Infiltration, Once, Will Zoey Cao MD    lidocaine (PF) (XYLOCAINE-MPF) 2 % injection 1.5 mL, 1.5 mL, Perineural, Once, Will Zoey Cao MD    methylPREDNISolone acetate (DEPO-MEDROL) injection 40 mg, 40 mg, Perineural, Once, Will Zoey Cao MD    No Known  Allergies    Physical Exam:   Vitals:    02/15/24 0826   BP: 120/78   Pulse: 61   Resp: 18   Temp: 97.7 °F (36.5 °C)   SpO2: 98%     General: Awake, Alert, Oriented x 3, Mood and affect appropriate  Respiratory: Respirations even and unlabored  Cardiovascular: Peripheral pulses intact; no edema  Musculoskeletal Exam: pain with lumbar ROM    ASA Score: 2    Patient/Chart Verification  Patient ID Verified: Verbal  ID Band Applied: No  Consents Confirmed: Procedural  H&P( within 30 days) Verified: To be obtained in the Pre-Procedure area  Interval H&P(within 24 hr) Complete (required for Outpatients and Surgery Admit only): To be obtained in the Pre-Procedure area  Allergies Reviewed: Yes  Anticoag/NSAID held?: NA  Currently on antibiotics?: No  Pregnancy denied?: NA    Assessment:   1. Lumbar spondylosis        Plan: Left L3-5 RFA

## 2024-02-16 RX ORDER — DULOXETIN HYDROCHLORIDE 20 MG/1
20 CAPSULE, DELAYED RELEASE ORAL DAILY
Qty: 90 CAPSULE | Refills: 0 | Status: SHIPPED | OUTPATIENT
Start: 2024-02-16

## 2024-02-16 NOTE — TELEPHONE ENCOUNTER
Attempted to reach pt, unable to leave a message due to VM not being set up.  Will try again later.

## 2024-02-16 NOTE — TELEPHONE ENCOUNTER
"S/w pt she said she finished up the rx from 11/2022, and was having a hard time with [facility] and getting refilled, so she hasn't taken since then.  Pt then rec'd a call from a nurse at her insurance company that told her she shouldn't of stopped it, and that's what prompted pt to ask for the refill.  Offered to move up pt's appt, but she stated she just had a \"nerve block\" on her back, and said she was ok to wait until the 4/9 appt. I advised her that if she wanted to be seen sooner to give us a call.  "

## 2024-02-16 NOTE — TELEPHONE ENCOUNTER
Please inform patient duloxetine 20 mg refilled, I will need to see her for follow-up since she is restarting this medication, she should keep her April appointment as scheduled.

## 2024-02-19 NOTE — TELEPHONE ENCOUNTER
S/W pt. Pt stated current pain level is 5/10. She is using the heating pad and taking Advil.  Pt stated needle sites look good, denies S&S of infection, denies fevers, has soreness and denies sun burn like sensation.  Advised pt if she does get pain to take her prescribed or OTC pain medications and/or use ice/heat and that it takes 4 to 6 weeks to see the full effect.  Confirmed next appt w/ pt.  Pt verbalized understanding.

## 2024-04-25 ENCOUNTER — TELEPHONE (OUTPATIENT)
Age: 59
End: 2024-04-25

## 2024-04-25 NOTE — TELEPHONE ENCOUNTER
Caller: patient    Doctor: alex    Reason for call: patient is applying for social security. They will be calling for patient info    Call back#:

## 2024-05-21 ENCOUNTER — TELEPHONE (OUTPATIENT)
Dept: OTHER | Facility: OTHER | Age: 59
End: 2024-05-21

## 2024-05-21 NOTE — TELEPHONE ENCOUNTER
Patient is calling regarding cancelling an appointment.    Date/Time:5/21/24 @ 10:20 am    Patient was rescheduled: YES [] NO [x]    Patient requesting call back to reschedule:   YES [] NO [x]    Pt will call back to reschedule

## 2024-05-29 ENCOUNTER — OFFICE VISIT (OUTPATIENT)
Dept: PAIN MEDICINE | Facility: CLINIC | Age: 59
End: 2024-05-29
Payer: COMMERCIAL

## 2024-05-29 VITALS
SYSTOLIC BLOOD PRESSURE: 144 MMHG | BODY MASS INDEX: 28.86 KG/M2 | HEIGHT: 60 IN | WEIGHT: 147 LBS | DIASTOLIC BLOOD PRESSURE: 92 MMHG

## 2024-05-29 DIAGNOSIS — M54.50 CHRONIC BILATERAL LOW BACK PAIN WITHOUT SCIATICA: Primary | ICD-10-CM

## 2024-05-29 DIAGNOSIS — M54.12 CERVICAL RADICULITIS: ICD-10-CM

## 2024-05-29 DIAGNOSIS — M54.2 CERVICALGIA: ICD-10-CM

## 2024-05-29 DIAGNOSIS — G89.29 CHRONIC BILATERAL LOW BACK PAIN WITHOUT SCIATICA: Primary | ICD-10-CM

## 2024-05-29 PROCEDURE — 99214 OFFICE O/P EST MOD 30 MIN: CPT | Performed by: ANESTHESIOLOGY

## 2024-05-29 RX ORDER — DICLOFENAC SODIUM 75 MG/1
75 TABLET, DELAYED RELEASE ORAL 2 TIMES DAILY
Qty: 60 TABLET | Refills: 0 | Status: SHIPPED | OUTPATIENT
Start: 2024-05-29

## 2024-05-29 RX ORDER — DICLOFENAC SODIUM 75 MG/1
75 TABLET, DELAYED RELEASE ORAL 2 TIMES DAILY
Qty: 60 TABLET | Refills: 0 | Status: SHIPPED | OUTPATIENT
Start: 2024-05-29 | End: 2024-05-29
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
## 2024-06-27 ENCOUNTER — VBI (OUTPATIENT)
Dept: ADMINISTRATIVE | Facility: OTHER | Age: 59
End: 2024-06-27

## 2024-06-27 NOTE — TELEPHONE ENCOUNTER
06/27/24 10:30 AM     Chart reviewed for Pap Smear (HPV) aka Cervical Cancer Screening ; nothing is submitted to the patient's insurance at this time.     Michelle Mack   [facility]

## 2024-07-02 DIAGNOSIS — M54.2 CERVICALGIA: ICD-10-CM

## 2024-07-02 DIAGNOSIS — G89.29 CHRONIC BILATERAL LOW BACK PAIN WITHOUT SCIATICA: ICD-10-CM

## 2024-07-02 DIAGNOSIS — M54.50 CHRONIC BILATERAL LOW BACK PAIN WITHOUT SCIATICA: ICD-10-CM

## 2024-07-08 ENCOUNTER — TELEPHONE (OUTPATIENT)
Dept: PAIN MEDICINE | Facility: CLINIC | Age: 59
End: 2024-07-08

## 2024-07-08 RX ORDER — DICLOFENAC SODIUM 75 MG/1
75 TABLET, DELAYED RELEASE ORAL 2 TIMES DAILY
Qty: 60 TABLET | Refills: 0 | Status: SHIPPED | OUTPATIENT
Start: 2024-07-08

## 2024-07-08 NOTE — TELEPHONE ENCOUNTER
Caller: Alina    Doctor: Kiley    Reason for call: patients forms from [employer] are under Media dated 6/28 can you please fill out and send back     Thank you     Call back#: [phone]

## 2024-07-09 NOTE — TELEPHONE ENCOUNTER
Caller: Ericka     Doctor: Kiley     Reason for call: Patient returning call stating please complete forms patient states she is unaware of what was sent but [employer] office asking for records patient would like a call back.    Call back#: [phone]

## 2024-07-09 NOTE — TELEPHONE ENCOUNTER
Caller: Patient- Ericka THERESA Aguayo     Doctor: Dr Cao    Reason for call: Patient is calling back in from a missed call she stated that it was just the forms and records that she is aware of that they were requesting.     Call back#: [phone]

## 2025-04-17 ENCOUNTER — VBI (OUTPATIENT)
Dept: ADMINISTRATIVE | Facility: OTHER | Age: 60
End: 2025-04-17

## 2025-04-17 NOTE — TELEPHONE ENCOUNTER
04/17/25 9:47 AM     Chart reviewed for Pap Smear (HPV) aka Cervical Cancer Screening ; nothing is submitted to the patient's insurance at this time.     Michelle Mack   [facility]

## 2025-05-12 ENCOUNTER — TELEPHONE (OUTPATIENT)
Age: 60
End: 2025-05-12

## 2025-05-12 NOTE — TELEPHONE ENCOUNTER
Patient called because she got her new [employer] [facility] card and it has Dr Hutchison listed as her Primary Care Provider.  She knows that Dr Hutchison has retired.  Offered to provide her with the NPI numbers for Dr Dykes and for Dr Mitchell so she could call to have it updated, but she was driving and was not able to write down the numbers.  She will call back to get those NPI numbers.

## 2025-06-10 ENCOUNTER — HOSPITAL ENCOUNTER (OUTPATIENT)
Dept: NON INVASIVE DIAGNOSTICS | Facility: CLINIC | Age: 60
Discharge: HOME | End: 2025-06-10
Payer: COMMERCIAL

## 2025-06-10 ENCOUNTER — OFFICE VISIT (OUTPATIENT)
Dept: CARDIOLOGY CLINIC | Facility: CLINIC | Age: 60
End: 2025-06-10
Payer: COMMERCIAL

## 2025-06-10 VITALS
HEART RATE: 81 BPM | WEIGHT: 155 LBS | DIASTOLIC BLOOD PRESSURE: 100 MMHG | BODY MASS INDEX: 30.27 KG/M2 | SYSTOLIC BLOOD PRESSURE: 148 MMHG | OXYGEN SATURATION: 98 %

## 2025-06-10 DIAGNOSIS — E78.2 MIXED HYPERLIPIDEMIA: ICD-10-CM

## 2025-06-10 DIAGNOSIS — I10 PRIMARY HYPERTENSION: ICD-10-CM

## 2025-06-10 DIAGNOSIS — R06.09 DOE (DYSPNEA ON EXERTION): ICD-10-CM

## 2025-06-10 DIAGNOSIS — H53.9 VISUAL DISTURBANCE OF ONE EYE: ICD-10-CM

## 2025-06-10 DIAGNOSIS — R42 DIZZINESSES: Primary | ICD-10-CM

## 2025-06-10 DIAGNOSIS — I16.0 HYPERTENSIVE URGENCY: ICD-10-CM

## 2025-06-10 DIAGNOSIS — I10 ESSENTIAL HYPERTENSION: ICD-10-CM

## 2025-06-10 PROCEDURE — 99204 OFFICE O/P NEW MOD 45 MIN: CPT | Performed by: INTERNAL MEDICINE

## 2025-06-10 PROCEDURE — 93226 XTRNL ECG REC<48 HR SCAN A/R: CPT

## 2025-06-10 PROCEDURE — 93225 XTRNL ECG REC<48 HRS REC: CPT

## 2025-06-10 RX ORDER — ATORVASTATIN CALCIUM 40 MG/1
40 TABLET, FILM COATED ORAL
Qty: 90 TABLET | Refills: 3 | Status: SHIPPED | OUTPATIENT
Start: 2025-06-10

## 2025-06-10 RX ORDER — ASPIRIN 81 MG/1
81 TABLET, CHEWABLE ORAL DAILY
Qty: 90 TABLET | Refills: 3 | Status: SHIPPED | OUTPATIENT
Start: 2025-06-10

## 2025-06-10 RX ORDER — IBUPROFEN 100 MG/5ML
SUSPENSION ORAL EVERY 6 HOURS PRN
COMMUNITY

## 2025-06-10 RX ORDER — EZETIMIBE 10 MG/1
10 TABLET ORAL DAILY
Qty: 90 TABLET | Refills: 3 | Status: SHIPPED | OUTPATIENT
Start: 2025-06-10 | End: 2026-06-05

## 2025-06-10 RX ORDER — METOPROLOL TARTRATE 25 MG/1
25 TABLET, FILM COATED ORAL EVERY 12 HOURS SCHEDULED
Qty: 180 TABLET | Refills: 3 | Status: SHIPPED | OUTPATIENT
Start: 2025-06-10

## 2025-06-10 RX ORDER — HYDROCHLOROTHIAZIDE 25 MG/1
25 TABLET ORAL DAILY
Qty: 90 TABLET | Refills: 3 | Status: SHIPPED | OUTPATIENT
Start: 2025-06-10

## 2025-06-10 NOTE — PATIENT INSTRUCTIONS
Schedule to have 48 hr Holter heart monitor.   Schedule to have echo   Restart medications which were refilled

## 2025-06-10 NOTE — PROGRESS NOTES
Cardiology Consultation     Ericka Aguayo  [id number]  1965  [facility]  [address]  [address]  [facility] [place] [facility] [place] [id number]    Assessment & Plan  Dizzinesses  A 48-hour Holter monitor is recommended to evaluate constellation of symptoms consisting of dizziness with right visual disturbance.  A transthoracic echocardiogram with agitated saline contrast study is also requested.  Medical compliance to cardiovascular medications was strongly advised.  Visual disturbance of one eye  As discussed, 48-hour Holter monitor and transthoracic echocardiogram are to be requested.  SAINZ (dyspnea on exertion)  A transthoracic echocardiogram is requested to evaluate for structural heart disease.  Essential hypertension  BP goal less than 130/80.  Blood pressure is elevated today as patient admits to missing her medications for months.  Refill of her medications were provided.  Mixed hyperlipidemia  Medical compliance to atorvastatin 40 mg p.o. daily was advised.      HPI:  This is a 59-year-old female is a former cardiac patient of Dr. Romeo was last seen on January 2022.  Patient has hypertension, mixed hyperlipidemia.  She had a cardiac catheterization on February 2020 which showed no angiographic evidence of occlusive coronary artery disease.  Transthoracic echo on May 2019 showed normal biventricular systolic function without any significant valvular abnormalities.  Patient is referred to cardiology for evaluation of dizziness.    For the past week, patient reports to recurrent dizziness occurring on a daily basis described as spinning sensation companied by right visual disturbances and near syncope.  She also complains of progressive exertional dyspnea on walking flight of stairs although she claims to be going to gym without issues. No PND, no orthopnea, no pedal edema.  She also complains of intermittent chest  pain described as midsternal tightness occurring mostly in the morning.  Her blood pressure is elevated today as she admits to missing her medications for the past months.  Refills of her cardiac medications were provided.    Problem List[1]  Past Medical History[2]  Social History     Socioeconomic History   • Marital status:      Spouse name: Not on file   • Number of children: Not on file   • Years of education: Not on file   • Highest education level: Not on file   Occupational History   • Not on file   Tobacco Use   • Smoking status: Never   • Smokeless tobacco: Never   Vaping Use   • Vaping status: Never Used   Substance and Sexual Activity   • Alcohol use: Yes     Comment: occasional   • Drug use: Never   • Sexual activity: Yes     Partners: Male     Birth control/protection: Post-menopausal, Female Sterilization   Other Topics Concern   • Not on file   Social History Narrative    Marital history: Currently  - [facility] per [facility]      Social Drivers of Health     Financial Resource Strain: Not on file   Food Insecurity: Not on file   Transportation Needs: Not on file   Physical Activity: Not on file   Stress: Not on file   Social Connections: Not on file   Intimate Partner Violence: Not on file   Housing Stability: Not on file      Family History[3]  Past Surgical History[4]  Current Medications[5]  No Known Allergies  Vitals:    06/10/25 0907   BP: 148/100   BP Location: Left arm   Patient Position: Sitting   Cuff Size: Large   Pulse: 81   SpO2: 98%   Weight: 70.3 kg (155 lb)       Labs:  Lab Results   Component Value Date     02/25/2014    K 4.2 04/05/2023    K Unable to nati 02/25/2014     (H) 04/05/2023     02/25/2014    CO2 27 04/05/2023    CO2 27 02/25/2014    BUN 14 04/05/2023    BUN 7 02/25/2014    CREATININE 0.84 04/05/2023    CREATININE 0.51 (L) 02/25/2014    GLUCOSE 87 02/25/2014    CALCIUM 9.0 04/05/2023    CALCIUM 8.4 02/25/2014     Lab Results   Component Value  Date    WBC 6.41 10/02/2020    WBC 4.13 (L) 02/25/2014    HGB 12.9 10/02/2020    HGB 13.3 02/28/2014    HGB 12.6 02/25/2014    HCT 40.9 10/02/2020    HCT 39.5 02/25/2014    MCV 94 10/02/2020    MCV 94 02/25/2014     10/02/2020     02/25/2014     Lab Results   Component Value Date    TRIG 343 (H) 04/05/2023    HDL 59 04/05/2023     Imaging: No results found.    My personal review of EKG during today's office visit showed NSR, NSST-T abnormalities, no change from previous.     Review of Systems:  Review of Systems   Eyes:  Positive for visual disturbance.   Respiratory:  Positive for shortness of breath.    Cardiovascular:  Positive for chest pain. Negative for palpitations and leg swelling.   Neurological:  Positive for dizziness.   All other systems reviewed and are negative.      Physical Exam:  Vitals and nursing note reviewed.   Constitutional:       General: not in acute distress.     Appearance: well-developed.   HENT:      Head: Normocephalic and atraumatic.   Neck:     Supple, no JVD, no carotid bruit.  Cardiovascular:      Rate and Rhythm: Normal rate. Rhythm regular.     Heart sounds: No murmur. Normal S1S2, No gallops. No rubs.      No pedal edema.   Pulmonary:      Effort: Pulmonary effort is normal. No respiratory distress.      Breath sounds: Normal breath sounds.   Abdominal:      Palpations: Abdomen is soft, no distention.     Tenderness: There is no abdominal tenderness.   Musculoskeletal:         General: No swelling.      Cervical back: Neck supple.   Skin:     General: Skin is warm and dry.    Neurological:      Mental Status: awake, alert, oriented x 3.   Psychiatric:         Mood and Affect: Mood normal.            [1]  Patient Active Problem List  Diagnosis   • Diastolic dysfunction   • Lung nodule   • Hypertension   • Heel spur, left   • Hyperlipidemia   • Gastric erosions   • Anxiety   • Dizziness   • SAINZ (dyspnea on exertion)   • Thyroid nodule   • Lumbosacral spondylosis  without myelopathy   • Lumbar spondylosis   [2]  Past Medical History:  Diagnosis Date   • Fibroids    • History of ovarian cyst    • Hyperlipidemia    • Hypertension    • Migraine    • Thyroid nodule    [3]  Family History  Problem Relation Name Age of Onset   • Arthritis Mother     • Diverticulitis Mother     • Heart disease Mother     • Hypertension Mother     • Migraines Mother     • Thyroid disease Mother     • Arthritis Maternal Grandmother     • Diabetes Maternal Grandmother     • Heart disease Maternal Grandmother     • Varicose Veins Maternal Grandmother     • Heart attack Maternal Grandmother     • Colon cancer Maternal Grandfather lara 62   • Diabetes Paternal Grandmother     • Heart disease Paternal Grandmother     • Heart attack Paternal Grandmother     • Heart disease Father     • Heart disease Sister     • Hypertension Sister     • No Known Problems Daughter     • Clotting disorder Paternal Grandfather     • Heart disease Brother     • Heart murmur Brother     • No Known Problems Son     • No Known Problems Son     • Breast cancer Other MGGM    [4]  Past Surgical History:  Procedure Laterality Date   • ENDOMETRIAL BIOPSY      W/o cervical dilation. Resolved 9/30/2014     • TONSILLECTOMY      Age 10     • TUBAL LIGATION     [5]    Current Outpatient Medications:   •  aspirin 81 mg chewable tablet, Chew 1 tablet (81 mg total) daily, Disp: 90 tablet, Rfl: 3  •  atorvastatin (LIPITOR) 40 mg tablet, Take 1 tablet (40 mg total) by mouth daily with dinner, Disp: 90 tablet, Rfl: 3  •  ezetimibe (ZETIA) 10 mg tablet, Take 1 tablet (10 mg total) by mouth daily, Disp: 90 tablet, Rfl: 3  •  hydroCHLOROthiazide 25 mg tablet, Take 1 tablet (25 mg total) by mouth daily, Disp: 90 tablet, Rfl: 3  •  ibuprofen (MOTRIN) 100 mg/5 mL suspension, Take by mouth every 6 (six) hours as needed for mild pain She's taking 250mg, Disp: , Rfl:   •  metoprolol tartrate (LOPRESSOR) 25 mg tablet, Take 1 tablet (25 mg total) by mouth  every 12 (twelve) hours, Disp: 180 tablet, Rfl: 3  •  ALPRAZolam (XANAX) 1 mg tablet, Take 1mg PO 60 minutes prior to procedure for anxiety, Disp: 1 tablet, Rfl: 0  •  diclofenac (VOLTAREN) 75 mg EC tablet, TAKE 1 TABLET BY MOUTH TWICE A DAY (Patient not taking: Reported on 6/10/2025), Disp: 60 tablet, Rfl: 0  •  DULoxetine (CYMBALTA) 20 mg capsule, Take 1 capsule (20 mg total) by mouth daily (Patient not taking: Reported on 6/10/2025), Disp: 90 capsule, Rfl: 0  •  tiZANidine (ZANAFLEX) 4 mg tablet, TAKE 1 TABLET BY MOUTH 2 TIMES A DAY. (Patient not taking: Reported on 6/10/2025), Disp: 60 tablet, Rfl: 1

## 2025-06-17 ENCOUNTER — RESULTS FOLLOW-UP (OUTPATIENT)
Dept: CARDIOLOGY CLINIC | Facility: CLINIC | Age: 60
End: 2025-06-17

## 2025-06-17 PROCEDURE — 93227 XTRNL ECG REC<48 HR R&I: CPT | Performed by: STUDENT IN AN ORGANIZED HEALTH CARE EDUCATION/TRAINING PROGRAM

## 2025-07-24 ENCOUNTER — OFFICE VISIT (OUTPATIENT)
Dept: FAMILY MEDICINE CLINIC | Facility: MEDICAL CENTER | Age: 60
End: 2025-07-24
Payer: COMMERCIAL

## 2025-07-24 ENCOUNTER — TELEPHONE (OUTPATIENT)
Dept: FAMILY MEDICINE CLINIC | Facility: MEDICAL CENTER | Age: 60
End: 2025-07-24

## 2025-07-24 VITALS
OXYGEN SATURATION: 98 % | HEART RATE: 88 BPM | BODY MASS INDEX: 30.7 KG/M2 | TEMPERATURE: 97.9 F | WEIGHT: 156.4 LBS | RESPIRATION RATE: 18 BRPM | SYSTOLIC BLOOD PRESSURE: 126 MMHG | DIASTOLIC BLOOD PRESSURE: 88 MMHG | HEIGHT: 60 IN

## 2025-07-24 DIAGNOSIS — F41.9 ANXIETY: ICD-10-CM

## 2025-07-24 DIAGNOSIS — I10 PRIMARY HYPERTENSION: Primary | ICD-10-CM

## 2025-07-24 DIAGNOSIS — E04.1 THYROID NODULE: ICD-10-CM

## 2025-07-24 DIAGNOSIS — R73.9 HYPERGLYCEMIA: ICD-10-CM

## 2025-07-24 DIAGNOSIS — R06.09 DOE (DYSPNEA ON EXERTION): ICD-10-CM

## 2025-07-24 DIAGNOSIS — M47.812 CERVICAL SPONDYLOSIS WITHOUT MYELOPATHY: ICD-10-CM

## 2025-07-24 DIAGNOSIS — M47.817 LUMBOSACRAL SPONDYLOSIS WITHOUT MYELOPATHY: ICD-10-CM

## 2025-07-24 DIAGNOSIS — E78.2 MIXED HYPERLIPIDEMIA: ICD-10-CM

## 2025-07-24 DIAGNOSIS — Z00.00 ANNUAL PHYSICAL EXAM: ICD-10-CM

## 2025-07-24 DIAGNOSIS — Z12.31 ENCOUNTER FOR SCREENING MAMMOGRAM FOR BREAST CANCER: ICD-10-CM

## 2025-07-24 DIAGNOSIS — K21.9 GASTROESOPHAGEAL REFLUX DISEASE WITHOUT ESOPHAGITIS: ICD-10-CM

## 2025-07-24 PROBLEM — M54.16 LUMBAR RADICULOPATHY: Status: RESOLVED | Noted: 2025-07-24 | Resolved: 2025-07-24

## 2025-07-24 PROBLEM — M54.16 LUMBAR RADICULOPATHY: Status: ACTIVE | Noted: 2025-07-24

## 2025-07-24 PROBLEM — R91.1 LUNG NODULE: Status: RESOLVED | Noted: 2019-05-08 | Resolved: 2025-07-24

## 2025-07-24 PROBLEM — M54.12 CERVICAL RADICULOPATHY: Status: ACTIVE | Noted: 2025-07-24

## 2025-07-24 PROBLEM — M47.816 LUMBAR SPONDYLOSIS: Status: RESOLVED | Noted: 2024-01-25 | Resolved: 2025-07-24

## 2025-07-24 PROCEDURE — 99214 OFFICE O/P EST MOD 30 MIN: CPT | Performed by: INTERNAL MEDICINE

## 2025-07-24 PROCEDURE — 99396 PREV VISIT EST AGE 40-64: CPT | Performed by: INTERNAL MEDICINE

## 2025-07-24 RX ORDER — GABAPENTIN 300 MG/1
300 CAPSULE ORAL
Qty: 90 CAPSULE | Refills: 3 | Status: SHIPPED | OUTPATIENT
Start: 2025-07-24

## 2025-07-24 RX ORDER — PANTOPRAZOLE SODIUM 40 MG/1
40 TABLET, DELAYED RELEASE ORAL
Qty: 90 TABLET | Refills: 3 | Status: SHIPPED | OUTPATIENT
Start: 2025-07-24 | End: 2026-07-19

## 2025-07-24 NOTE — ASSESSMENT & PLAN NOTE
Orders:    gabapentin (NEURONTIN) 300 mg capsule; Take 1 capsule (300 mg total) by mouth daily at bedtime    tiZANidine (ZANAFLEX) 4 mg tablet; Take 1 tablet (4 mg total) by mouth 2 (two) times a day  Follow-up with pain management

## 2025-07-24 NOTE — ASSESSMENT & PLAN NOTE
She had a thyroid ultrasound about 2 years ago and there was no suspicious nodules.  Will follow-up with a thyroid ultrasound

## 2025-07-24 NOTE — ASSESSMENT & PLAN NOTE
Orders:    CBC and differential; Future    Comprehensive metabolic panel; Future    Lipid panel; Future    TSH, 3rd generation; Future  Continue atorvastatin and Zetia.  She follows up with cardiology
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
## 2025-07-24 NOTE — ASSESSMENT & PLAN NOTE
Orders:    gabapentin (NEURONTIN) 300 mg capsule; Take 1 capsule (300 mg total) by mouth daily at bedtime    tiZANidine (ZANAFLEX) 4 mg tablet; Take 1 tablet (4 mg total) by mouth 2 (two) times a day  She has not been taking any medication for the back pain.  Was started on gabapentin and tizanidine.  Also follows up with pain management

## 2025-07-24 NOTE — PATIENT INSTRUCTIONS
"Patient Education     Routine physical for adults   The Basics   Written by the doctors and editors at [facility]   What is a physical? -- A physical is a routine visit, or \"check-up,\" with your doctor. You might also hear it called a \"wellness visit\" or \"preventive visit.\"  During each visit, the doctor will:   Ask about your physical and mental health   Ask about your habits, behaviors, and lifestyle   Do an exam   Give you vaccines if needed   Talk to you about any medicines you take   Give advice about your health   Answer your questions  Getting regular check-ups is an important part of taking care of your health. It can help your doctor find and treat any problems you have. But it's also important for preventing health problems.  A routine physical is different from a \"sick visit.\" A sick visit is when you see a doctor because of a health concern or problem. Since physicals are scheduled ahead of time, you can think about what you want to ask the doctor.  How often should I get a physical? -- It depends on your age and health. In general, for people age 21 years and older:   If you are younger than 50 years, you might be able to get a physical every 3 years.   If you are 50 years or older, your doctor might recommend a physical every year.  If you have an ongoing health condition, like diabetes or high blood pressure, your doctor will probably want to see you more often.  What happens during a physical? -- In general, each visit will include:   Physical exam - The doctor or nurse will check your height, weight, heart rate, and blood pressure. They will also look at your eyes and ears. They will ask about how you are feeling and whether you have any symptoms that bother you.   Medicines - It's a good idea to bring a list of all the medicines you take to each doctor visit. Your doctor will talk to you about your medicines and answer any questions. Tell them if you are having any side effects that bother you. You " "should also tell them if you are having trouble paying for any of your medicines.   Habits and behaviors - This includes:   Your diet   Your exercise habits   Whether you smoke, drink alcohol, or use drugs   Whether you are sexually active   Whether you feel safe at home  Your doctor will talk to you about things you can do to improve your health and lower your risk of health problems. They will also offer help and support. For example, if you want to quit smoking, they can give you advice and might prescribe medicines. If you want to improve your diet or get more physical activity, they can help you with this, too.   Lab tests, if needed - The tests you get will depend on your age and situation. For example, your doctor might want to check your:   Cholesterol   Blood sugar   Iron level   Vaccines - The recommended vaccines will depend on your age, health, and what vaccines you already had. Vaccines are very important because they can prevent certain serious or deadly infections.   Discussion of screening - \"Screening\" means checking for diseases or other health problems before they cause symptoms. Your doctor can recommend screening based on your age, risk, and preferences. This might include tests to check for:   Cancer, such as breast, prostate, cervical, ovarian, colorectal, prostate, lung, or skin cancer   Sexually transmitted infections, such as chlamydia and gonorrhea   Mental health conditions like depression and anxiety  Your doctor will talk to you about the different types of screening tests. They can help you decide which screenings to have. They can also explain what the results might mean.   Answering questions - The physical is a good time to ask the doctor or nurse questions about your health. If needed, they can refer you to other doctors or specialists, too.  Adults older than 65 years often need other care, too. As you get older, your doctor will talk to you about:   How to prevent falling at " home   Hearing or vision tests   Memory testing   How to take your medicines safely   Making sure that you have the help and support you need at home  All topics are updated as new evidence becomes available and our peer review process is complete.  This topic retrieved from [employer] on: May 02, 2024.  Topic 752237 Version 1.0  Release: 32.4.3 - C32.122  © 2024 UpToDate, Inc[employer] and/or its affiliates. All rights reserved.  Consumer Information Use and Disclaimer   Disclaimer: This generalized information is a limited summary of diagnosis, treatment, and/or medication information. It is not meant to be comprehensive and should be used as a tool to help the user understand and/or assess potential diagnostic and treatment options. It does NOT include all information about conditions, treatments, medications, side effects, or risks that may apply to a specific patient. It is not intended to be medical advice or a substitute for the medical advice, diagnosis, or treatment of a health care provider based on the health care provider's examination and assessment of a patient's specific and unique circumstances. Patients must speak with a health care provider for complete information about their health, medical questions, and treatment options, including any risks or benefits regarding use of medications. This information does not endorse any treatments or medications as safe, effective, or approved for treating a specific patient. Up[employer]Date, Inc[employer] and its affiliates disclaim any warranty or liability relating to this information or the use thereof.The use of this information is governed by the Terms of Use, available at https://www.woltersInboundWriteruwer.com/en/know/clinical-effectiveness-terms. 2024© UpToDate, Inc. and its affiliates and/or licensors. All rights reserved.  Copyright   © 2024 Up[employer], [employer] and/or its affiliates. All rights reserved.

## 2025-07-24 NOTE — ASSESSMENT & PLAN NOTE
Orders:    pantoprazole (PROTONIX) 40 mg tablet; Take 1 tablet (40 mg total) by mouth daily before breakfast    Ambulatory Referral to Gastroenterology; Future  She was started on pantoprazole for symptom control but was also advised to follow-up with GI.

## 2025-07-27 ENCOUNTER — OFFICE VISIT (OUTPATIENT)
Dept: URGENT CARE | Facility: MEDICAL CENTER | Age: 60
End: 2025-07-27
Payer: COMMERCIAL

## 2025-07-27 VITALS
HEART RATE: 80 BPM | OXYGEN SATURATION: 98 % | SYSTOLIC BLOOD PRESSURE: 148 MMHG | HEIGHT: 60 IN | WEIGHT: 156 LBS | RESPIRATION RATE: 18 BRPM | TEMPERATURE: 98 F | DIASTOLIC BLOOD PRESSURE: 88 MMHG | BODY MASS INDEX: 30.63 KG/M2

## 2025-07-27 DIAGNOSIS — T63.444A BEE STING, UNDETERMINED INTENT, INITIAL ENCOUNTER: Primary | ICD-10-CM

## 2025-07-27 PROCEDURE — 99213 OFFICE O/P EST LOW 20 MIN: CPT | Performed by: PHYSICIAN ASSISTANT

## 2025-07-27 RX ORDER — PREDNISONE 20 MG/1
40 TABLET ORAL DAILY
Qty: 10 TABLET | Refills: 0 | Status: SHIPPED | OUTPATIENT
Start: 2025-07-27 | End: 2025-08-01

## 2025-07-27 RX ORDER — CEPHALEXIN 500 MG/1
500 CAPSULE ORAL EVERY 6 HOURS SCHEDULED
Qty: 28 CAPSULE | Refills: 0 | Status: SHIPPED | OUTPATIENT
Start: 2025-07-27 | End: 2025-08-03

## 2025-07-29 ENCOUNTER — DOCUMENTATION (OUTPATIENT)
Dept: ADMINISTRATIVE | Facility: OTHER | Age: 60
End: 2025-07-29

## 2025-07-29 VITALS — SYSTOLIC BLOOD PRESSURE: 148 MMHG | DIASTOLIC BLOOD PRESSURE: 88 MMHG

## 2025-07-31 ENCOUNTER — HOSPITAL ENCOUNTER (OUTPATIENT)
Dept: NON INVASIVE DIAGNOSTICS | Facility: CLINIC | Age: 60
Discharge: HOME | End: 2025-07-31
Attending: INTERNAL MEDICINE
Payer: COMMERCIAL

## 2025-07-31 VITALS
DIASTOLIC BLOOD PRESSURE: 88 MMHG | BODY MASS INDEX: 30.63 KG/M2 | HEART RATE: 70 BPM | WEIGHT: 156 LBS | HEIGHT: 60 IN | SYSTOLIC BLOOD PRESSURE: 148 MMHG

## 2025-07-31 LAB
AORTIC ROOT: 2.5 CM
ASCENDING AORTA: 2.8 CM
AV LVOT MEAN GRADIENT: 2 MMHG
AV LVOT PEAK GRADIENT: 4 MMHG
BSA FOR ECHO PROCEDURE: 1.68 M2
DOP CALC LVOT PEAK VEL VTI: 23.69 CM
DOP CALC LVOT PEAK VEL: 1.03 M/S
E WAVE DECELERATION TIME: 181 MS
E/A RATIO: 0.8
FRACTIONAL SHORTENING: 28 (ref 28–44)
INTERVENTRICULAR SEPTUM IN DIASTOLE (PARASTERNAL SHORT AXIS VIEW): 0.9 CM
INTERVENTRICULAR SEPTUM: 0.9 CM (ref 0.6–1.1)
LAAS-AP2: 19 CM2
LAAS-AP4: 19.8 CM2
LEFT ATRIUM SIZE: 4.2 CM
LEFT ATRIUM VOLUME (MOD BIPLANE): 62 ML
LEFT ATRIUM VOLUME INDEX (MOD BIPLANE): 36.9 ML/M2
LEFT INTERNAL DIMENSION IN SYSTOLE: 3.1 CM (ref 2.1–4)
LEFT VENTRICULAR INTERNAL DIMENSION IN DIASTOLE: 4.3 CM (ref 3.5–6)
LEFT VENTRICULAR POSTERIOR WALL IN END DIASTOLE: 0.9 CM
LEFT VENTRICULAR STROKE VOLUME: 44 ML
LV EF US.2D.A4C+ESTIMATED: 54 %
LVSV (TEICH): 44 ML
MV E'TISSUE VEL-SEP: 9 CM/S
MV PEAK A VEL: 0.84 M/S
MV PEAK E VEL: 67 CM/S
MV STENOSIS PRESSURE HALF TIME: 52 MS
MV VALVE AREA P 1/2 METHOD: 4.23
RA PRESSURE ESTIMATED: 8 MMHG
RIGHT ATRIUM AREA SYSTOLE A4C: 14.1 CM2
RIGHT VENTRICLE ID DIMENSION: 3.1 CM
RV PSP: 22 MMHG
SL CV LEFT ATRIUM LENGTH A2C: 4.8 CM
SL CV LV EF: 60
SL CV PED ECHO LEFT VENTRICLE DIASTOLIC VOLUME (MOD BIPLANE) 2D: 83 ML
SL CV PED ECHO LEFT VENTRICLE SYSTOLIC VOLUME (MOD BIPLANE) 2D: 39 ML
TR MAX PG: 14 MMHG
TR PEAK VELOCITY: 1.9 M/S
TRICUSPID ANNULAR PLANE SYSTOLIC EXCURSION: 2.4 CM
TRICUSPID VALVE PEAK REGURGITATION VELOCITY: 1.9 M/S

## 2025-07-31 PROCEDURE — 93306 TTE W/DOPPLER COMPLETE: CPT | Performed by: INTERNAL MEDICINE

## 2025-07-31 PROCEDURE — 93306 TTE W/DOPPLER COMPLETE: CPT

## 2025-08-01 ENCOUNTER — TELEPHONE (OUTPATIENT)
Age: 60
End: 2025-08-01

## 2025-08-08 ENCOUNTER — VBI (OUTPATIENT)
Dept: ADMINISTRATIVE | Facility: OTHER | Age: 60
End: 2025-08-08

## 2025-08-17 DIAGNOSIS — M47.817 LUMBOSACRAL SPONDYLOSIS WITHOUT MYELOPATHY: ICD-10-CM

## 2025-08-17 DIAGNOSIS — M47.812 CERVICAL SPONDYLOSIS WITHOUT MYELOPATHY: ICD-10-CM
